# Patient Record
Sex: MALE | NOT HISPANIC OR LATINO | URBAN - METROPOLITAN AREA
[De-identification: names, ages, dates, MRNs, and addresses within clinical notes are randomized per-mention and may not be internally consistent; named-entity substitution may affect disease eponyms.]

---

## 2023-09-07 ENCOUNTER — NURSING HOME VISIT (OUTPATIENT)
Dept: POST ACUTE CARE | Facility: EXTERNAL LOCATION | Age: 88
End: 2023-09-07
Payer: COMMERCIAL

## 2023-09-07 DIAGNOSIS — E78.49 OTHER HYPERLIPIDEMIA: ICD-10-CM

## 2023-09-07 DIAGNOSIS — F03.90 DEMENTIA, UNSPECIFIED DEMENTIA SEVERITY, UNSPECIFIED DEMENTIA TYPE, UNSPECIFIED WHETHER BEHAVIORAL, PSYCHOTIC, OR MOOD DISTURBANCE OR ANXIETY (MULTI): Primary | ICD-10-CM

## 2023-09-07 DIAGNOSIS — R53.1 ASTHENIA: ICD-10-CM

## 2023-09-07 DIAGNOSIS — Z91.81 AT RISK FOR FALLS: ICD-10-CM

## 2023-09-07 DIAGNOSIS — K21.9 GASTROESOPHAGEAL REFLUX DISEASE WITHOUT ESOPHAGITIS: ICD-10-CM

## 2023-09-07 DIAGNOSIS — M19.09 OSTEOARTHRITIS OF OTHER SITE, UNSPECIFIED OSTEOARTHRITIS TYPE: ICD-10-CM

## 2023-09-07 DIAGNOSIS — G47.09 OTHER INSOMNIA: ICD-10-CM

## 2023-09-07 DIAGNOSIS — F32.A DEPRESSION, UNSPECIFIED DEPRESSION TYPE: ICD-10-CM

## 2023-09-07 DIAGNOSIS — F20.89 OTHER SCHIZOPHRENIA (MULTI): ICD-10-CM

## 2023-09-07 DIAGNOSIS — F79 INTELLECTUAL DISABILITY: ICD-10-CM

## 2023-09-07 DIAGNOSIS — N40.0 BENIGN PROSTATIC HYPERPLASIA WITHOUT URINARY OBSTRUCTION: ICD-10-CM

## 2023-09-07 PROCEDURE — 99308 SBSQ NF CARE LOW MDM 20: CPT | Performed by: INTERNAL MEDICINE

## 2023-09-07 NOTE — LETTER
- , , T bili 4.4 on admission.  Previous labs at [facility] on 10/9/17 with AST 71, ALT 23, T bili 0.8.  -AST//154 on 10/28/17  - Hold home statin, and analgesics with acetaminophen.  - Check   - GI consulted    Patient: Anthony Bliar  : 1935    Encounter Date: 2023    NAME OF THE PATIENT: Anthony Blair     YOB: 1935    PLACE OF SERVICE: [facility].    This is a subsequent visit.    HPI: Mr. Anthony Blair is an 88-year-old male with history of dementia and intellectual disability.  He suffers from congestive heart failure and is unable to care for himself.  He requires supportive care.    PAST MEDICAL HISTORY:  As per nursing home list.  GERD, BPH, insomnia, dementia, SVT, CHF, hypertension, intellectual disability, fall risk.    CURRENT MEDICATIONS:  As per nursing home list.  Reviewed.    ALLERGIES:  As per nursing home list.  No known allergies.    SOCIAL HISTORY:  As per nursing home list.  Positive history of tobacco abuse.    FAMILY HISTORY:  As per nursing home list.  Positive history of colon cancer and CHF in this patient's family.    REVIEW OF SYSTEMS:  All 12 systems reviewed and pertaining covered in history and physical, the rest are negative.  The patient denies any fevers, chills, or chest pain at this time.    PHYSICAL EXAMINATION  GENERAL: This is a well-developed, well-nourished male, sitting in a chair, in no acute distress.  VITAL SIGNS:  As recorded and reviewed from EMR.  Blood pressure 124/74.  Pulse 76.  Respirations 18.  Temperature 97.8.  EYES:  The patient's sclerae white.  The pupils were equal and round.  ENT:  The patient's external ears were normal and the otoscopic examination was negative.  NECK:  Supple.  There were no palpable masses.  Thyroid was not enlarged and there were no carotid bruits.  RESPIRATORY:  The patient had normal inspirations and expirations.  The breath sounds were equal bilaterally and clear to auscultation.  CARDIOVASCULAR:  The patient had S1 normal, split S2 without obvious rubs, clicks, or murmurs.  GASTROINTESTINAL:  There was no hepatosplenomegaly.  There were no palpable masses and no inguinal nodes.  LYMPHATIC:  The patient had  no axillary, groin, or lymphadenopathy.  MUSCULOSKELETAL:  The patient had normal gait.  The joints appeared to be normal without evidence of deformity.  Grossly the muscles had good range of motion and were without effusion.  EXTREMITIES:  There was no evidence of peripheral edema.  NEUROLOGIC:  The patient had normal cranial nerves.  The reflexes, sensory, and motor examination were grossly within normal limits.  PSYCHIATRIC: The patient had normal judgment and insight.  The patient was oriented to person, place, and time, and had no obvious mood defect including anxiety and/or agitation.  MENTAL STATUS EXAMINATION: The patient is alert and oriented x2.    LAB WORK: Laboratory studies were reviewed.    ASSESSMENT AND PLAN:  Dementia, unchanged.  Intellectual disability, on supportive care.  Weakness, on PT/OT.  Fall risk, on fall precautions.  Schizoaffective disorder, on medication.  Depression, on medication.  Insomnia, on melatonin.  Hyperlipidemia, on statin.  Reflux disease, on PPI.  Osteoarthritis, on Tylenol.  BPH, on tamsulosin.  Medication list reviewed and discussed with the family.  Hospital chart reviewed.      Electronically Signed By: Don Lozada MD   9/28/23  4:31 PM

## 2023-09-27 PROBLEM — F32.A DEPRESSION: Status: ACTIVE | Noted: 2023-09-27

## 2023-09-27 PROBLEM — G47.09 OTHER INSOMNIA: Status: ACTIVE | Noted: 2023-09-27

## 2023-09-27 PROBLEM — Z91.81 AT RISK FOR FALLS: Status: ACTIVE | Noted: 2023-09-27

## 2023-09-27 PROBLEM — M19.90 OSTEOARTHRITIS: Status: ACTIVE | Noted: 2023-09-27

## 2023-09-27 PROBLEM — N40.0 BENIGN PROSTATIC HYPERPLASIA WITHOUT URINARY OBSTRUCTION: Status: ACTIVE | Noted: 2023-09-27

## 2023-09-27 PROBLEM — F03.90 DEMENTIA (MULTI): Status: ACTIVE | Noted: 2023-09-27

## 2023-09-27 PROBLEM — K21.9 GASTROESOPHAGEAL REFLUX DISEASE WITHOUT ESOPHAGITIS: Status: ACTIVE | Noted: 2023-09-27

## 2023-09-27 PROBLEM — F79 INTELLECTUAL DISABILITY: Status: ACTIVE | Noted: 2023-09-27

## 2023-09-27 PROBLEM — R53.1 ASTHENIA: Status: ACTIVE | Noted: 2023-09-27

## 2023-09-27 PROBLEM — E78.49 OTHER HYPERLIPIDEMIA: Status: ACTIVE | Noted: 2023-09-27

## 2023-09-27 PROBLEM — F20.89 OTHER SCHIZOPHRENIA (MULTI): Status: ACTIVE | Noted: 2023-09-27

## 2023-09-27 NOTE — PROGRESS NOTES
NAME OF THE PATIENT: Anthony Blair     YOB: 1935    PLACE OF SERVICE: [facility].    This is a subsequent visit.    HPI: Mr. Anthony Blair is an 88-year-old male with history of dementia and intellectual disability.  He suffers from congestive heart failure and is unable to care for himself.  He requires supportive care.    PAST MEDICAL HISTORY:  As per nursing home list.  GERD, BPH, insomnia, dementia, SVT, CHF, hypertension, intellectual disability, fall risk.    CURRENT MEDICATIONS:  As per nursing home list.  Reviewed.    ALLERGIES:  As per nursing home list.  No known allergies.    SOCIAL HISTORY:  As per nursing home list.  Positive history of tobacco abuse.    FAMILY HISTORY:  As per nursing home list.  Positive history of colon cancer and CHF in this patient's family.    REVIEW OF SYSTEMS:  All 12 systems reviewed and pertaining covered in history and physical, the rest are negative.  The patient denies any fevers, chills, or chest pain at this time.    PHYSICAL EXAMINATION  GENERAL: This is a well-developed, well-nourished male, sitting in a chair, in no acute distress.  VITAL SIGNS:  As recorded and reviewed from EMR.  Blood pressure 124/74.  Pulse 76.  Respirations 18.  Temperature 97.8.  EYES:  The patient's sclerae white.  The pupils were equal and round.  ENT:  The patient's external ears were normal and the otoscopic examination was negative.  NECK:  Supple.  There were no palpable masses.  Thyroid was not enlarged and there were no carotid bruits.  RESPIRATORY:  The patient had normal inspirations and expirations.  The breath sounds were equal bilaterally and clear to auscultation.  CARDIOVASCULAR:  The patient had S1 normal, split S2 without obvious rubs, clicks, or murmurs.  GASTROINTESTINAL:  There was no hepatosplenomegaly.  There were no palpable masses and no inguinal nodes.  LYMPHATIC:  The patient had no axillary, groin, or lymphadenopathy.  MUSCULOSKELETAL:  The  patient had normal gait.  The joints appeared to be normal without evidence of deformity.  Grossly the muscles had good range of motion and were without effusion.  EXTREMITIES:  There was no evidence of peripheral edema.  NEUROLOGIC:  The patient had normal cranial nerves.  The reflexes, sensory, and motor examination were grossly within normal limits.  PSYCHIATRIC: The patient had normal judgment and insight.  The patient was oriented to person, place, and time, and had no obvious mood defect including anxiety and/or agitation.  MENTAL STATUS EXAMINATION: The patient is alert and oriented x2.    LAB WORK: Laboratory studies were reviewed.    ASSESSMENT AND PLAN:  Dementia, unchanged.  Intellectual disability, on supportive care.  Weakness, on PT/OT.  Fall risk, on fall precautions.  Schizoaffective disorder, on medication.  Depression, on medication.  Insomnia, on melatonin.  Hyperlipidemia, on statin.  Reflux disease, on PPI.  Osteoarthritis, on Tylenol.  BPH, on tamsulosin.  Medication list reviewed and discussed with the family.  Hospital chart reviewed.

## 2023-10-01 ENCOUNTER — NURSING HOME VISIT (OUTPATIENT)
Dept: POST ACUTE CARE | Facility: EXTERNAL LOCATION | Age: 88
End: 2023-10-01
Payer: COMMERCIAL

## 2023-10-01 DIAGNOSIS — F31.9 BIPOLAR AFFECTIVE DISORDER, REMISSION STATUS UNSPECIFIED (MULTI): ICD-10-CM

## 2023-10-01 DIAGNOSIS — R53.1 WEAKNESS: ICD-10-CM

## 2023-10-01 DIAGNOSIS — N40.0 BENIGN PROSTATIC HYPERPLASIA WITHOUT URINARY OBSTRUCTION: ICD-10-CM

## 2023-10-01 DIAGNOSIS — E78.49 OTHER HYPERLIPIDEMIA: ICD-10-CM

## 2023-10-01 DIAGNOSIS — Z91.81 AT RISK FOR FALLS: ICD-10-CM

## 2023-10-01 DIAGNOSIS — K21.9 GASTROESOPHAGEAL REFLUX DISEASE WITHOUT ESOPHAGITIS: ICD-10-CM

## 2023-10-01 DIAGNOSIS — F03.90 DEMENTIA, UNSPECIFIED DEMENTIA SEVERITY, UNSPECIFIED DEMENTIA TYPE, UNSPECIFIED WHETHER BEHAVIORAL, PSYCHOTIC, OR MOOD DISTURBANCE OR ANXIETY (MULTI): ICD-10-CM

## 2023-10-01 DIAGNOSIS — G47.09 OTHER INSOMNIA: ICD-10-CM

## 2023-10-01 DIAGNOSIS — F79 INTELLECTUAL DISABILITY: Primary | ICD-10-CM

## 2023-10-01 DIAGNOSIS — M19.09 OSTEOARTHRITIS OF OTHER SITE, UNSPECIFIED OSTEOARTHRITIS TYPE: ICD-10-CM

## 2023-10-01 PROCEDURE — 99308 SBSQ NF CARE LOW MDM 20: CPT | Performed by: INTERNAL MEDICINE

## 2023-10-01 NOTE — LETTER
Patient: Anthony Blair  : 1935    Encounter Date: 10/01/2023    Subjective  Patient ID: Anthony Blair is a 88 y.o. male who presents for Follow-up.    Mr. Anthony Blair is an 88-year-old male with history of intellectual disability with Alzheimer's dementia.  He is unable to care for himself and requires supportive care.    Review of Systems   Constitutional:  Negative for chills and fever.   Cardiovascular:  Negative for chest pain.   All other systems reviewed and are negative.    Objective  /80   Pulse 76   Temp 36.4 °C (97.5 °F)   Resp 16     Physical Exam  Vitals reviewed.   Constitutional:       General: He is not in acute distress.     Comments: This is a well-developed, elderly male, sitting in a chair.   HENT:      Right Ear: Tympanic membrane, ear canal and external ear normal.      Left Ear: Tympanic membrane, ear canal and external ear normal.   Eyes:      General: No scleral icterus.     Pupils: Pupils are equal, round, and reactive to light.   Neck:      Vascular: No carotid bruit.   Cardiovascular:      Heart sounds: Normal heart sounds, S1 normal and S2 normal. No murmur heard.     No friction rub.   Pulmonary:      Effort: Pulmonary effort is normal.      Breath sounds: Normal breath sounds and air entry.   Abdominal:      Palpations: There is no hepatomegaly, splenomegaly or mass.   Musculoskeletal:         General: No swelling or deformity. Normal range of motion.      Cervical back: Neck supple.      Right lower leg: No edema.      Left lower leg: No edema.   Lymphadenopathy:      Cervical: No cervical adenopathy.      Upper Body:      Right upper body: No axillary adenopathy.      Left upper body: No axillary adenopathy.      Lower Body: No right inguinal adenopathy. No left inguinal adenopathy.   Neurological:      Mental Status: He is alert.      Cranial Nerves: Cranial nerves 2-12 are intact. No cranial nerve deficit.      Sensory: No sensory deficit.      Motor: Motor function is  intact. No weakness.      Gait: Gait is intact.      Deep Tendon Reflexes: Reflexes normal.      Comments: The patient is oriented x2.   Psychiatric:         Mood and Affect: Mood normal. Mood is not anxious or depressed. Affect is not angry.         Behavior: Behavior is not agitated.         Thought Content: Thought content normal.         Judgment: Judgment normal.     LAB WORK:  Laboratory studies reviewed.    Assessment/Plan  Problem List Items Addressed This Visit             ICD-10-CM       Advance Directives and General Issues    At risk for falls Z91.81       Cardiac and Vasculature    Other hyperlipidemia E78.49       Gastrointestinal and Abdominal    Gastroesophageal reflux disease without esophagitis K21.9       Genitourinary and Reproductive    Benign prostatic hyperplasia without urinary obstruction N40.0       Musculoskeletal and Injuries    Osteoarthritis M19.90       Neuro    Dementia (CMS/HCC) F03.90    Intellectual disability - Primary F79       Sleep    Other insomnia G47.09     Other Visit Diagnoses         Codes    Weakness     R53.1    Bipolar affective disorder, remission status unspecified (CMS/HCC)     F31.9        1. Intellectual disability, on supportive care.  2. Dementia, unchanged.  3. Weakness, on PT and OT.  4. Fall risk, fall precaution.  5. BPH, on tamsulosin.  6. Reflux disease, on PPI.  7. Osteoarthritis, on Tylenol.  8. Bipolar disorder, on medication.  9. Insomnia, on medication.  10. Hyperlipidemia, on statin.    Scribe Attestation  By signing my name below, I, Antonette Bravo, Scribe attest that this documentation has been prepared under the direction and in the presence of Don Lozada MD.       Electronically Signed By: Don Lozada MD   11/6/23  5:18 PM

## 2023-10-07 VITALS
TEMPERATURE: 97.5 F | HEART RATE: 76 BPM | RESPIRATION RATE: 16 BRPM | SYSTOLIC BLOOD PRESSURE: 126 MMHG | DIASTOLIC BLOOD PRESSURE: 80 MMHG

## 2023-10-07 ASSESSMENT — ENCOUNTER SYMPTOMS
CHILLS: 0
FEVER: 0

## 2023-10-07 NOTE — PROGRESS NOTES
Subjective   Patient ID: Anthony Blair is a 88 y.o. male who presents for Follow-up.    Mr. Anthony Blair is an 88-year-old male with history of intellectual disability with Alzheimer's dementia.  He is unable to care for himself and requires supportive care.    Review of Systems   Constitutional:  Negative for chills and fever.   Cardiovascular:  Negative for chest pain.   All other systems reviewed and are negative.    Objective   /80   Pulse 76   Temp 36.4 °C (97.5 °F)   Resp 16     Physical Exam  Vitals reviewed.   Constitutional:       General: He is not in acute distress.     Comments: This is a well-developed, elderly male, sitting in a chair.   HENT:      Right Ear: Tympanic membrane, ear canal and external ear normal.      Left Ear: Tympanic membrane, ear canal and external ear normal.   Eyes:      General: No scleral icterus.     Pupils: Pupils are equal, round, and reactive to light.   Neck:      Vascular: No carotid bruit.   Cardiovascular:      Heart sounds: Normal heart sounds, S1 normal and S2 normal. No murmur heard.     No friction rub.   Pulmonary:      Effort: Pulmonary effort is normal.      Breath sounds: Normal breath sounds and air entry.   Abdominal:      Palpations: There is no hepatomegaly, splenomegaly or mass.   Musculoskeletal:         General: No swelling or deformity. Normal range of motion.      Cervical back: Neck supple.      Right lower leg: No edema.      Left lower leg: No edema.   Lymphadenopathy:      Cervical: No cervical adenopathy.      Upper Body:      Right upper body: No axillary adenopathy.      Left upper body: No axillary adenopathy.      Lower Body: No right inguinal adenopathy. No left inguinal adenopathy.   Neurological:      Mental Status: He is alert.      Cranial Nerves: Cranial nerves 2-12 are intact. No cranial nerve deficit.      Sensory: No sensory deficit.      Motor: Motor function is intact. No weakness.      Gait: Gait is intact.      Deep Tendon  Reflexes: Reflexes normal.      Comments: The patient is oriented x2.   Psychiatric:         Mood and Affect: Mood normal. Mood is not anxious or depressed. Affect is not angry.         Behavior: Behavior is not agitated.         Thought Content: Thought content normal.         Judgment: Judgment normal.     LAB WORK:  Laboratory studies reviewed.    Assessment/Plan   Problem List Items Addressed This Visit             ICD-10-CM       Advance Directives and General Issues    At risk for falls Z91.81       Cardiac and Vasculature    Other hyperlipidemia E78.49       Gastrointestinal and Abdominal    Gastroesophageal reflux disease without esophagitis K21.9       Genitourinary and Reproductive    Benign prostatic hyperplasia without urinary obstruction N40.0       Musculoskeletal and Injuries    Osteoarthritis M19.90       Neuro    Dementia (CMS/HCC) F03.90    Intellectual disability - Primary F79       Sleep    Other insomnia G47.09     Other Visit Diagnoses         Codes    Weakness     R53.1    Bipolar affective disorder, remission status unspecified (CMS/HCC)     F31.9        1. Intellectual disability, on supportive care.  2. Dementia, unchanged.  3. Weakness, on PT and OT.  4. Fall risk, fall precaution.  5. BPH, on tamsulosin.  6. Reflux disease, on PPI.  7. Osteoarthritis, on Tylenol.  8. Bipolar disorder, on medication.  9. Insomnia, on medication.  10. Hyperlipidemia, on statin.    Scribe Attestation  By signing my name below, I, Xander Dawn attest that this documentation has been prepared under the direction and in the presence of Don Lozada MD.     All medical record entries made by the scribe were personally dictated by me I have reviewed the chart and agree the record accurately reflects my personal performance of his history physical examination and management

## 2023-11-04 ENCOUNTER — NURSING HOME VISIT (OUTPATIENT)
Dept: POST ACUTE CARE | Facility: EXTERNAL LOCATION | Age: 88
End: 2023-11-04
Payer: COMMERCIAL

## 2023-11-04 DIAGNOSIS — F03.90 DEMENTIA, UNSPECIFIED DEMENTIA SEVERITY, UNSPECIFIED DEMENTIA TYPE, UNSPECIFIED WHETHER BEHAVIORAL, PSYCHOTIC, OR MOOD DISTURBANCE OR ANXIETY (MULTI): Primary | ICD-10-CM

## 2023-11-04 DIAGNOSIS — K21.9 GASTROESOPHAGEAL REFLUX DISEASE WITHOUT ESOPHAGITIS: ICD-10-CM

## 2023-11-04 DIAGNOSIS — F79 INTELLECTUAL DISABILITY: ICD-10-CM

## 2023-11-04 DIAGNOSIS — Z91.81 AT RISK FOR FALLS: ICD-10-CM

## 2023-11-04 DIAGNOSIS — E78.49 OTHER HYPERLIPIDEMIA: ICD-10-CM

## 2023-11-04 DIAGNOSIS — F31.9 BIPOLAR AFFECTIVE DISORDER, REMISSION STATUS UNSPECIFIED (MULTI): ICD-10-CM

## 2023-11-04 DIAGNOSIS — N40.0 BENIGN PROSTATIC HYPERPLASIA WITHOUT URINARY OBSTRUCTION: ICD-10-CM

## 2023-11-04 DIAGNOSIS — G47.09 OTHER INSOMNIA: ICD-10-CM

## 2023-11-04 DIAGNOSIS — R53.1 WEAKNESS: ICD-10-CM

## 2023-11-04 DIAGNOSIS — M19.09 OSTEOARTHRITIS OF OTHER SITE, UNSPECIFIED OSTEOARTHRITIS TYPE: ICD-10-CM

## 2023-11-04 PROCEDURE — 99308 SBSQ NF CARE LOW MDM 20: CPT | Performed by: INTERNAL MEDICINE

## 2023-11-04 NOTE — LETTER
Patient: Anthony Blair  : 1935    Encounter Date: 2023      PLACE OF SERVICE:  [facility].    This is a subsequent visit.    Subjective  Patient ID: Anthony Blair is a 88 y.o. male who presents for Follow-up.    Mr. Anthony Blair is an 88-year-old male with history of intellectual disability who suffers from dementia.  He has several medical issues.  He is unable to care for himself.  He requires supportive care.    Review of Systems   Constitutional:  Negative for chills and fever.   Cardiovascular:  Negative for chest pain.   All other systems reviewed and are negative.    Objective  /80   Pulse 78   Temp 36.4 °C (97.6 °F)   Resp 16     Physical Exam  Vitals reviewed.   Constitutional:       General: He is not in acute distress.     Comments: This is a well-developed, elderly male, sitting in a chair.   HENT:      Right Ear: Tympanic membrane, ear canal and external ear normal.      Left Ear: Tympanic membrane, ear canal and external ear normal.   Eyes:      General: No scleral icterus.     Pupils: Pupils are equal, round, and reactive to light.   Neck:      Vascular: No carotid bruit.   Cardiovascular:      Heart sounds: Normal heart sounds, S1 normal and S2 normal. No murmur heard.     No friction rub.   Pulmonary:      Effort: Pulmonary effort is normal.      Breath sounds: Normal breath sounds and air entry.   Abdominal:      Palpations: There is no hepatomegaly, splenomegaly or mass.   Musculoskeletal:         General: No swelling or deformity. Normal range of motion.      Cervical back: Neck supple.      Right lower leg: No edema.      Left lower leg: No edema.   Lymphadenopathy:      Cervical: No cervical adenopathy.      Upper Body:      Right upper body: No axillary adenopathy.      Left upper body: No axillary adenopathy.      Lower Body: No right inguinal adenopathy. No left inguinal adenopathy.   Neurological:      Mental Status: He is alert.      Cranial Nerves: Cranial  nerves 2-12 are intact. No cranial nerve deficit.      Sensory: No sensory deficit.      Motor: Motor function is intact. No weakness.      Gait: Gait is intact.      Deep Tendon Reflexes: Reflexes normal.      Comments: The patient is oriented x1.   Psychiatric:         Mood and Affect: Mood normal. Mood is not anxious or depressed. Affect is not angry.         Behavior: Behavior is not agitated.         Thought Content: Thought content normal.         Judgment: Judgment normal.     LAB WORK: Laboratory studies were reviewed.    Assessment/Plan  Problem List Items Addressed This Visit             ICD-10-CM       Advance Directives and General Issues    At risk for falls Z91.81       Cardiac and Vasculature    Other hyperlipidemia E78.49       Gastrointestinal and Abdominal    Gastroesophageal reflux disease without esophagitis K21.9       Genitourinary and Reproductive    Benign prostatic hyperplasia without urinary obstruction N40.0       Musculoskeletal and Injuries    Osteoarthritis M19.90       Neuro    Dementia (CMS/HCC) - Primary F03.90    Intellectual disability F79       Sleep    Other insomnia G47.09     Other Visit Diagnoses         Codes    Weakness     R53.1    Bipolar affective disorder, remission status unspecified (CMS/HCC)     F31.9        1. Dementia, unchanged.  2. Intellectual disability, on supportive care.  3. Weakness, on PT/OT  4. Fall risk, on fall precautions.  5. BPH, on tamsulosin.  6. Reflux disease, on PPI  7. Osteoarthritis, on Tylenol.  8. Bipolar disorder, on medication.  9. Hyperlipidemia, on statin.  10. Insomnia, on medication.    Scribe Attestation  By signing my name below, Elizabeth REYES Scribe attest that this documentation has been prepared under the direction and in the presence of Don Lozada MD.     All medical record entries made by the scribe were personally dictated by me I have reviewed the chart and agree the record accurately reflects my personal performance of  his history physical examination and management      Electronically Signed By: Don Lozada MD   11/13/23  5:13 PM

## 2023-11-11 VITALS
SYSTOLIC BLOOD PRESSURE: 126 MMHG | HEART RATE: 78 BPM | RESPIRATION RATE: 16 BRPM | TEMPERATURE: 97.6 F | DIASTOLIC BLOOD PRESSURE: 80 MMHG

## 2023-11-11 ASSESSMENT — ENCOUNTER SYMPTOMS
FEVER: 0
CHILLS: 0

## 2023-11-11 NOTE — PROGRESS NOTES
PLACE OF SERVICE:  [facility].    This is a subsequent visit.    Subjective   Patient ID: Anthony Blair is a 88 y.o. male who presents for Follow-up.    Mr. Anthony Blair is an 88-year-old male with history of intellectual disability who suffers from dementia.  He has several medical issues.  He is unable to care for himself.  He requires supportive care.    Review of Systems   Constitutional:  Negative for chills and fever.   Cardiovascular:  Negative for chest pain.   All other systems reviewed and are negative.    Objective   /80   Pulse 78   Temp 36.4 °C (97.6 °F)   Resp 16     Physical Exam  Vitals reviewed.   Constitutional:       General: He is not in acute distress.     Comments: This is a well-developed, elderly male, sitting in a chair.   HENT:      Right Ear: Tympanic membrane, ear canal and external ear normal.      Left Ear: Tympanic membrane, ear canal and external ear normal.   Eyes:      General: No scleral icterus.     Pupils: Pupils are equal, round, and reactive to light.   Neck:      Vascular: No carotid bruit.   Cardiovascular:      Heart sounds: Normal heart sounds, S1 normal and S2 normal. No murmur heard.     No friction rub.   Pulmonary:      Effort: Pulmonary effort is normal.      Breath sounds: Normal breath sounds and air entry.   Abdominal:      Palpations: There is no hepatomegaly, splenomegaly or mass.   Musculoskeletal:         General: No swelling or deformity. Normal range of motion.      Cervical back: Neck supple.      Right lower leg: No edema.      Left lower leg: No edema.   Lymphadenopathy:      Cervical: No cervical adenopathy.      Upper Body:      Right upper body: No axillary adenopathy.      Left upper body: No axillary adenopathy.      Lower Body: No right inguinal adenopathy. No left inguinal adenopathy.   Neurological:      Mental Status: He is alert.      Cranial Nerves: Cranial nerves 2-12 are intact. No cranial nerve deficit.      Sensory: No  sensory deficit.      Motor: Motor function is intact. No weakness.      Gait: Gait is intact.      Deep Tendon Reflexes: Reflexes normal.      Comments: The patient is oriented x1.   Psychiatric:         Mood and Affect: Mood normal. Mood is not anxious or depressed. Affect is not angry.         Behavior: Behavior is not agitated.         Thought Content: Thought content normal.         Judgment: Judgment normal.     LAB WORK: Laboratory studies were reviewed.    Assessment/Plan   Problem List Items Addressed This Visit             ICD-10-CM       Advance Directives and General Issues    At risk for falls Z91.81       Cardiac and Vasculature    Other hyperlipidemia E78.49       Gastrointestinal and Abdominal    Gastroesophageal reflux disease without esophagitis K21.9       Genitourinary and Reproductive    Benign prostatic hyperplasia without urinary obstruction N40.0       Musculoskeletal and Injuries    Osteoarthritis M19.90       Neuro    Dementia (CMS/HCC) - Primary F03.90    Intellectual disability F79       Sleep    Other insomnia G47.09     Other Visit Diagnoses         Codes    Weakness     R53.1    Bipolar affective disorder, remission status unspecified (CMS/HCC)     F31.9        1. Dementia, unchanged.  2. Intellectual disability, on supportive care.  3. Weakness, on PT/OT  4. Fall risk, on fall precautions.  5. BPH, on tamsulosin.  6. Reflux disease, on PPI  7. Osteoarthritis, on Tylenol.  8. Bipolar disorder, on medication.  9. Hyperlipidemia, on statin.  10. Insomnia, on medication.    Scribe Attestation  By signing my name below, IElizabeth Scribe attest that this documentation has been prepared under the direction and in the presence of Don Lozada MD.     All medical record entries made by the scribe were personally dictated by me I have reviewed the chart and agree the record accurately reflects my personal performance of his history physical examination and management

## 2023-12-02 ENCOUNTER — NURSING HOME VISIT (OUTPATIENT)
Dept: POST ACUTE CARE | Facility: EXTERNAL LOCATION | Age: 88
End: 2023-12-02
Payer: COMMERCIAL

## 2023-12-02 DIAGNOSIS — F32.A DEPRESSION, UNSPECIFIED DEPRESSION TYPE: ICD-10-CM

## 2023-12-02 DIAGNOSIS — R53.1 WEAKNESS: ICD-10-CM

## 2023-12-02 DIAGNOSIS — E78.49 OTHER HYPERLIPIDEMIA: ICD-10-CM

## 2023-12-02 DIAGNOSIS — N40.0 BENIGN PROSTATIC HYPERPLASIA WITHOUT URINARY OBSTRUCTION: ICD-10-CM

## 2023-12-02 DIAGNOSIS — M19.09 OSTEOARTHRITIS OF OTHER SITE, UNSPECIFIED OSTEOARTHRITIS TYPE: ICD-10-CM

## 2023-12-02 DIAGNOSIS — F03.90 DEMENTIA, UNSPECIFIED DEMENTIA SEVERITY, UNSPECIFIED DEMENTIA TYPE, UNSPECIFIED WHETHER BEHAVIORAL, PSYCHOTIC, OR MOOD DISTURBANCE OR ANXIETY (MULTI): Primary | ICD-10-CM

## 2023-12-02 DIAGNOSIS — K21.9 GASTROESOPHAGEAL REFLUX DISEASE WITHOUT ESOPHAGITIS: ICD-10-CM

## 2023-12-02 DIAGNOSIS — G47.09 OTHER INSOMNIA: ICD-10-CM

## 2023-12-02 DIAGNOSIS — Z91.81 AT RISK FOR FALLS: ICD-10-CM

## 2023-12-02 DIAGNOSIS — F31.9 BIPOLAR AFFECTIVE DISORDER, REMISSION STATUS UNSPECIFIED (MULTI): ICD-10-CM

## 2023-12-02 DIAGNOSIS — F79 INTELLECTUAL DISABILITY: ICD-10-CM

## 2023-12-02 PROCEDURE — 99308 SBSQ NF CARE LOW MDM 20: CPT | Performed by: INTERNAL MEDICINE

## 2023-12-02 NOTE — LETTER
Patient: Anthony Blair  : 1935    Encounter Date: 2023    PLACE OF SERVICE:  [facility].    This is a subsequent visit.    Subjective  Patient ID: Anthony Blair is a 88 y.o. male who presents for Follow-up.    Mr. Anthony Blair is an 88-year-old male with a history of dementia.  He suffers from anorexia.  He has multiple medical issues including he is a fall risk.  He requires ssupportive care.    Review of Systems   Constitutional:  Negative for chills and fever.   Cardiovascular:  Negative for chest pain.   All other systems reviewed and are negative.    Objective  /82   Pulse 84   Temp 36.6 °C (97.9 °F)   Resp 18     Physical Exam  Vitals reviewed.   Constitutional:       General: He is not in acute distress.     Comments: This is a well-developed, well-nourished male, sitting in a chair.   HENT:      Right Ear: Tympanic membrane, ear canal and external ear normal.      Left Ear: Tympanic membrane, ear canal and external ear normal.   Eyes:      General: No scleral icterus.     Pupils: Pupils are equal, round, and reactive to light.   Neck:      Vascular: No carotid bruit.   Cardiovascular:      Heart sounds: Normal heart sounds, S1 normal and S2 normal. No murmur heard.     No friction rub.   Pulmonary:      Effort: Pulmonary effort is normal.      Breath sounds: Normal breath sounds and air entry.   Abdominal:      Palpations: There is no hepatomegaly, splenomegaly or mass.   Musculoskeletal:         General: No swelling or deformity. Normal range of motion.      Cervical back: Neck supple.      Right lower leg: No edema.      Left lower leg: No edema.   Lymphadenopathy:      Cervical: No cervical adenopathy.      Upper Body:      Right upper body: No axillary adenopathy.      Left upper body: No axillary adenopathy.      Lower Body: No right inguinal adenopathy. No left inguinal adenopathy.   Neurological:      Mental Status: He is alert.      Cranial Nerves: Cranial nerves 2-12 are  intact. No cranial nerve deficit.      Sensory: No sensory deficit.      Motor: Motor function is intact. No weakness.      Gait: Gait is intact.      Deep Tendon Reflexes: Reflexes normal.      Comments: The patient is oriented x1.   Psychiatric:         Mood and Affect: Mood normal. Mood is not anxious or depressed. Affect is not angry.         Behavior: Behavior is not agitated.         Thought Content: Thought content normal.         Judgment: Judgment normal.     LAB WORK: Laboratory studies were reviewed.    Assessment/Plan  Problem List Items Addressed This Visit             ICD-10-CM       Advance Directives and General Issues    At risk for falls Z91.81       Cardiac and Vasculature    Other hyperlipidemia E78.49       Gastrointestinal and Abdominal    Gastroesophageal reflux disease without esophagitis K21.9       Genitourinary and Reproductive    Benign prostatic hyperplasia without urinary obstruction N40.0       Mental Health    Depression F32.A       Musculoskeletal and Injuries    Osteoarthritis M19.90       Neuro    Dementia (CMS/HCC) - Primary F03.90    Intellectual disability F79       Sleep    Other insomnia G47.09     Other Visit Diagnoses         Codes    Weakness     R53.1    Bipolar affective disorder, remission status unspecified (CMS/HCC)     F31.9        1. Dementia, unchanged.  2. Intellectual disability, on supportive care.  3. Weakness, on PT/OT.  4. Fall risk, on fall precaution.  5. Depression, on medication.  6. Hyperlipidemia, on statin.  7. Bipolar disorder, on medication.  8. Insomnia, on melatonin.  9. Reflux disease, on PPI.  10. BPH, on tamsulosin.  11. Osteoarthritis, on Tylenol.    Scribe Attestation  By signing my name below, I, Antonette Mancillai, Scribe attest that this documentation has been prepared under the direction and in the presence of Don Lozada MD.       Electronically Signed By: Don Lozada MD   1/4/24  2:57 PM

## 2023-12-09 ENCOUNTER — NURSING HOME VISIT (OUTPATIENT)
Dept: POST ACUTE CARE | Facility: EXTERNAL LOCATION | Age: 88
End: 2023-12-09
Payer: COMMERCIAL

## 2023-12-09 DIAGNOSIS — F31.9 BIPOLAR AFFECTIVE DISORDER, REMISSION STATUS UNSPECIFIED (MULTI): ICD-10-CM

## 2023-12-09 DIAGNOSIS — G47.09 OTHER INSOMNIA: ICD-10-CM

## 2023-12-09 DIAGNOSIS — R63.0 ANOREXIA: ICD-10-CM

## 2023-12-09 DIAGNOSIS — N40.0 BENIGN PROSTATIC HYPERPLASIA WITHOUT URINARY OBSTRUCTION: ICD-10-CM

## 2023-12-09 DIAGNOSIS — F03.90 DEMENTIA, UNSPECIFIED DEMENTIA SEVERITY, UNSPECIFIED DEMENTIA TYPE, UNSPECIFIED WHETHER BEHAVIORAL, PSYCHOTIC, OR MOOD DISTURBANCE OR ANXIETY (MULTI): ICD-10-CM

## 2023-12-09 DIAGNOSIS — F32.A DEPRESSION, UNSPECIFIED DEPRESSION TYPE: ICD-10-CM

## 2023-12-09 DIAGNOSIS — F79 INTELLECTUAL DISABILITY: Primary | ICD-10-CM

## 2023-12-09 DIAGNOSIS — R53.1 WEAKNESS: ICD-10-CM

## 2023-12-09 DIAGNOSIS — Z91.81 AT RISK FOR FALLS: ICD-10-CM

## 2023-12-09 DIAGNOSIS — K21.9 GASTROESOPHAGEAL REFLUX DISEASE WITHOUT ESOPHAGITIS: ICD-10-CM

## 2023-12-09 DIAGNOSIS — E78.49 OTHER HYPERLIPIDEMIA: ICD-10-CM

## 2023-12-09 PROCEDURE — 99309 SBSQ NF CARE MODERATE MDM 30: CPT | Performed by: INTERNAL MEDICINE

## 2023-12-09 NOTE — LETTER
Patient: Anthony Blair  : 1935    Encounter Date: 2023    PLACE OF SERVICE:  [facility].    This is a subsequent visit.    Subjective  Patient ID: Anthony Blair is a 88 y.o. male who presents for Follow-up.    Mr. Anthony Blair is an 88-year-old male with history of intellectual disability with dementia and anorexia.  He is unable to care for himself.  He requires supportive care.    Review of Systems   Constitutional:  Negative for chills and fever.   Cardiovascular:  Negative for chest pain.   All other systems reviewed and are negative.    Objective  /80   Pulse 80   Temp 36.6 °C (97.9 °F)   Resp 18     Physical Exam  Vitals reviewed.   Constitutional:       General: He is not in acute distress.     Comments: This is a well-developed, well-nourished male, sitting in a chair.   HENT:      Right Ear: Tympanic membrane, ear canal and external ear normal.      Left Ear: Tympanic membrane, ear canal and external ear normal.   Eyes:      General: No scleral icterus.     Pupils: Pupils are equal, round, and reactive to light.   Neck:      Vascular: No carotid bruit.   Cardiovascular:      Heart sounds: Normal heart sounds, S1 normal and S2 normal. No murmur heard.     No friction rub.   Pulmonary:      Effort: Pulmonary effort is normal.      Breath sounds: Normal breath sounds and air entry.   Abdominal:      Palpations: There is no hepatomegaly, splenomegaly or mass.   Musculoskeletal:         General: No swelling or deformity. Normal range of motion.      Cervical back: Neck supple.      Right lower leg: No edema.      Left lower leg: No edema.   Lymphadenopathy:      Cervical: No cervical adenopathy.      Upper Body:      Right upper body: No axillary adenopathy.      Left upper body: No axillary adenopathy.      Lower Body: No right inguinal adenopathy. No left inguinal adenopathy.   Neurological:      Mental Status: He is alert and oriented to person, place, and time.      Cranial  Nerves: Cranial nerves 2-12 are intact. No cranial nerve deficit.      Sensory: No sensory deficit.      Motor: Motor function is intact. No weakness.      Gait: Gait is intact.      Deep Tendon Reflexes: Reflexes normal.      Comments: The patient is oriented x1.   Psychiatric:         Mood and Affect: Mood normal. Mood is not anxious or depressed. Affect is not angry.         Behavior: Behavior is not agitated.         Thought Content: Thought content normal.         Judgment: Judgment normal.     LAB WORK:  Laboratory studies were reviewed.    Assessment/Plan  Problem List Items Addressed This Visit             ICD-10-CM       Advance Directives and General Issues    At risk for falls Z91.81       Cardiac and Vasculature    Other hyperlipidemia E78.49       Gastrointestinal and Abdominal    Gastroesophageal reflux disease without esophagitis K21.9       Genitourinary and Reproductive    Benign prostatic hyperplasia without urinary obstruction N40.0       Mental Health    Depression F32.A       Neuro    Dementia (CMS/[facility]) F03.90    Intellectual disability - Primary F79       Sleep    Other insomnia G47.09     Other Visit Diagnoses         Codes    Anorexia     R63.0    Weakness     R53.1    Bipolar affective disorder, remission status unspecified (CMS/HCC)     F31.9        1. Intellectual disability, on supportive care.  2. Anorexia.  Encouraged to eat.  3. Dementia, unchanged.  4. Weakness, on PT/OT.  5. Fall risk, on fall precautions.  6. BPH, on tamsulosin.  7. Reflux disease, on PPI.  8. Bipolar disorder, on medication.  9. Hyperlipidemia, on statin.  10. Depression, on medication.  11. Insomnia, on melatonin.    Scribe Attestation  By signing my name below, IAntonette Scribe attest that this documentation has been prepared under the direction and in the presence of Don Lozada MD.   All medical record entries made by the scribe were personally dictated by me I have reviewed the chart and agree the  record accurately reflects my personal performance of his history physical examination and management      Electronically Signed By: Don Lozada MD   12/19/23  4:10 PM

## 2023-12-15 VITALS
SYSTOLIC BLOOD PRESSURE: 126 MMHG | HEART RATE: 84 BPM | TEMPERATURE: 97.9 F | RESPIRATION RATE: 18 BRPM | DIASTOLIC BLOOD PRESSURE: 82 MMHG

## 2023-12-15 VITALS
RESPIRATION RATE: 18 BRPM | TEMPERATURE: 97.9 F | HEART RATE: 80 BPM | SYSTOLIC BLOOD PRESSURE: 126 MMHG | DIASTOLIC BLOOD PRESSURE: 80 MMHG

## 2023-12-15 ASSESSMENT — ENCOUNTER SYMPTOMS
FEVER: 0
FEVER: 0
CHILLS: 0
CHILLS: 0

## 2023-12-15 NOTE — PROGRESS NOTES
PLACE OF SERVICE:  [facility].    This is a subsequent visit.    Subjective   Patient ID: Anthony Blair is a 88 y.o. male who presents for Follow-up.    Mr. Anthony Blair is an 88-year-old male with a history of dementia.  He suffers from anorexia.  He has multiple medical issues including he is a fall risk.  He requires ssupportive care.    Review of Systems   Constitutional:  Negative for chills and fever.   Cardiovascular:  Negative for chest pain.   All other systems reviewed and are negative.    Objective   /82   Pulse 84   Temp 36.6 °C (97.9 °F)   Resp 18     Physical Exam  Vitals reviewed.   Constitutional:       General: He is not in acute distress.     Comments: This is a well-developed, well-nourished male, sitting in a chair.   HENT:      Right Ear: Tympanic membrane, ear canal and external ear normal.      Left Ear: Tympanic membrane, ear canal and external ear normal.   Eyes:      General: No scleral icterus.     Pupils: Pupils are equal, round, and reactive to light.   Neck:      Vascular: No carotid bruit.   Cardiovascular:      Heart sounds: Normal heart sounds, S1 normal and S2 normal. No murmur heard.     No friction rub.   Pulmonary:      Effort: Pulmonary effort is normal.      Breath sounds: Normal breath sounds and air entry.   Abdominal:      Palpations: There is no hepatomegaly, splenomegaly or mass.   Musculoskeletal:         General: No swelling or deformity. Normal range of motion.      Cervical back: Neck supple.      Right lower leg: No edema.      Left lower leg: No edema.   Lymphadenopathy:      Cervical: No cervical adenopathy.      Upper Body:      Right upper body: No axillary adenopathy.      Left upper body: No axillary adenopathy.      Lower Body: No right inguinal adenopathy. No left inguinal adenopathy.   Neurological:      Mental Status: He is alert.      Cranial Nerves: Cranial nerves 2-12 are intact. No cranial nerve deficit.      Sensory: No sensory  deficit.      Motor: Motor function is intact. No weakness.      Gait: Gait is intact.      Deep Tendon Reflexes: Reflexes normal.      Comments: The patient is oriented x1.   Psychiatric:         Mood and Affect: Mood normal. Mood is not anxious or depressed. Affect is not angry.         Behavior: Behavior is not agitated.         Thought Content: Thought content normal.         Judgment: Judgment normal.     LAB WORK: Laboratory studies were reviewed.    Assessment/Plan   Problem List Items Addressed This Visit             ICD-10-CM       Advance Directives and General Issues    At risk for falls Z91.81       Cardiac and Vasculature    Other hyperlipidemia E78.49       Gastrointestinal and Abdominal    Gastroesophageal reflux disease without esophagitis K21.9       Genitourinary and Reproductive    Benign prostatic hyperplasia without urinary obstruction N40.0       Mental Health    Depression F32.A       Musculoskeletal and Injuries    Osteoarthritis M19.90       Neuro    Dementia (CMS/HCC) - Primary F03.90    Intellectual disability F79       Sleep    Other insomnia G47.09     Other Visit Diagnoses         Codes    Weakness     R53.1    Bipolar affective disorder, remission status unspecified (CMS/HCC)     F31.9        1. Dementia, unchanged.  2. Intellectual disability, on supportive care.  3. Weakness, on PT/OT.  4. Fall risk, on fall precaution.  5. Depression, on medication.  6. Hyperlipidemia, on statin.  7. Bipolar disorder, on medication.  8. Insomnia, on melatonin.  9. Reflux disease, on PPI.  10. BPH, on tamsulosin.  11. Osteoarthritis, on Tylenol.    Scribe Attestation  By signing my name below, Antonette REYES, Scribe attest that this documentation has been prepared under the direction and in the presence of Don Lozada MD.

## 2023-12-15 NOTE — PROGRESS NOTES
PLACE OF SERVICE:  [facility].    This is a subsequent visit.    Subjective   Patient ID: Anthony Blair is a 88 y.o. male who presents for Follow-up.    Mr. Anthony Blair is an 88-year-old male with history of intellectual disability with dementia and anorexia.  He is unable to care for himself.  He requires supportive care.    Review of Systems   Constitutional:  Negative for chills and fever.   Cardiovascular:  Negative for chest pain.   All other systems reviewed and are negative.    Objective   /80   Pulse 80   Temp 36.6 °C (97.9 °F)   Resp 18     Physical Exam  Vitals reviewed.   Constitutional:       General: He is not in acute distress.     Comments: This is a well-developed, well-nourished male, sitting in a chair.   HENT:      Right Ear: Tympanic membrane, ear canal and external ear normal.      Left Ear: Tympanic membrane, ear canal and external ear normal.   Eyes:      General: No scleral icterus.     Pupils: Pupils are equal, round, and reactive to light.   Neck:      Vascular: No carotid bruit.   Cardiovascular:      Heart sounds: Normal heart sounds, S1 normal and S2 normal. No murmur heard.     No friction rub.   Pulmonary:      Effort: Pulmonary effort is normal.      Breath sounds: Normal breath sounds and air entry.   Abdominal:      Palpations: There is no hepatomegaly, splenomegaly or mass.   Musculoskeletal:         General: No swelling or deformity. Normal range of motion.      Cervical back: Neck supple.      Right lower leg: No edema.      Left lower leg: No edema.   Lymphadenopathy:      Cervical: No cervical adenopathy.      Upper Body:      Right upper body: No axillary adenopathy.      Left upper body: No axillary adenopathy.      Lower Body: No right inguinal adenopathy. No left inguinal adenopathy.   Neurological:      Mental Status: He is alert and oriented to person, place, and time.      Cranial Nerves: Cranial nerves 2-12 are intact. No cranial nerve deficit.       Sensory: No sensory deficit.      Motor: Motor function is intact. No weakness.      Gait: Gait is intact.      Deep Tendon Reflexes: Reflexes normal.      Comments: The patient is oriented x1.   Psychiatric:         Mood and Affect: Mood normal. Mood is not anxious or depressed. Affect is not angry.         Behavior: Behavior is not agitated.         Thought Content: Thought content normal.         Judgment: Judgment normal.     LAB WORK:  Laboratory studies were reviewed.    Assessment/Plan   Problem List Items Addressed This Visit             ICD-10-CM       Advance Directives and General Issues    At risk for falls Z91.81       Cardiac and Vasculature    Other hyperlipidemia E78.49       Gastrointestinal and Abdominal    Gastroesophageal reflux disease without esophagitis K21.9       Genitourinary and Reproductive    Benign prostatic hyperplasia without urinary obstruction N40.0       Mental Health    Depression F32.A       Neuro    Dementia (CMS/[facility]) F03.90    Intellectual disability - Primary F79       Sleep    Other insomnia G47.09     Other Visit Diagnoses         Codes    Anorexia     R63.0    Weakness     R53.1    Bipolar affective disorder, remission status unspecified (CMS/HCC)     F31.9        1. Intellectual disability, on supportive care.  2. Anorexia.  Encouraged to eat.  3. Dementia, unchanged.  4. Weakness, on PT/OT.  5. Fall risk, on fall precautions.  6. BPH, on tamsulosin.  7. Reflux disease, on PPI.  8. Bipolar disorder, on medication.  9. Hyperlipidemia, on statin.  10. Depression, on medication.  11. Insomnia, on melatonin.    Scribe Attestation  By signing my name below, IAntonette Scribe attest that this documentation has been prepared under the direction and in the presence of Don Lozada MD.   All medical record entries made by the scribe were personally dictated by me I have reviewed the chart and agree the record accurately reflects my personal performance of his history  physical examination and management

## 2023-12-16 ENCOUNTER — NURSING HOME VISIT (OUTPATIENT)
Dept: POST ACUTE CARE | Facility: EXTERNAL LOCATION | Age: 88
End: 2023-12-16
Payer: COMMERCIAL

## 2023-12-16 DIAGNOSIS — F03.90 DEMENTIA, UNSPECIFIED DEMENTIA SEVERITY, UNSPECIFIED DEMENTIA TYPE, UNSPECIFIED WHETHER BEHAVIORAL, PSYCHOTIC, OR MOOD DISTURBANCE OR ANXIETY (MULTI): Primary | ICD-10-CM

## 2023-12-16 DIAGNOSIS — N40.0 BENIGN PROSTATIC HYPERPLASIA WITHOUT URINARY OBSTRUCTION: ICD-10-CM

## 2023-12-16 DIAGNOSIS — Z91.81 AT RISK FOR FALLS: ICD-10-CM

## 2023-12-16 DIAGNOSIS — R63.0 ANOREXIA: ICD-10-CM

## 2023-12-16 DIAGNOSIS — E78.5 HYPERLIPIDEMIA, UNSPECIFIED HYPERLIPIDEMIA TYPE: ICD-10-CM

## 2023-12-16 DIAGNOSIS — M19.90 OSTEOARTHRITIS, UNSPECIFIED OSTEOARTHRITIS TYPE, UNSPECIFIED SITE: ICD-10-CM

## 2023-12-16 DIAGNOSIS — G47.00 INSOMNIA, UNSPECIFIED TYPE: ICD-10-CM

## 2023-12-16 DIAGNOSIS — R53.1 WEAKNESS: ICD-10-CM

## 2023-12-16 DIAGNOSIS — F31.9 BIPOLAR AFFECTIVE DISORDER, REMISSION STATUS UNSPECIFIED (MULTI): ICD-10-CM

## 2023-12-16 DIAGNOSIS — F79 INTELLECTUAL DISABILITY: ICD-10-CM

## 2023-12-16 DIAGNOSIS — K21.9 GASTROESOPHAGEAL REFLUX DISEASE WITHOUT ESOPHAGITIS: ICD-10-CM

## 2023-12-16 PROCEDURE — 99308 SBSQ NF CARE LOW MDM 20: CPT | Performed by: INTERNAL MEDICINE

## 2023-12-16 NOTE — LETTER
Patient: Anthony Blair  : 1935    Encounter Date: 2023    PLACE OF SERVICE:  [facility], [place]    This is a subsequent visit.    Subjective  Patient ID: Anthony Blair is a 88 y.o. male who presents for Follow-up.    Mr. Anthony Blair is an 88-year-old male with history of Alzheimer's dementia.  He suffers from intellectual disability and has a difficult time ambulating.  He requires supportive care.    Review of Systems   Constitutional:  Negative for chills and fever.   Cardiovascular:  Negative for chest pain.   All other systems reviewed and are negative.    Objective  /82   Pulse 80   Temp 36.4 °C (97.6 °F)   Resp 16     Physical Exam  Vitals reviewed.   Constitutional:       General: He is not in acute distress.     Comments: This is a well-developed, well-nourished male, sitting in a chair.   HENT:      Right Ear: Tympanic membrane, ear canal and external ear normal.      Left Ear: Tympanic membrane, ear canal and external ear normal.   Eyes:      General: No scleral icterus.     Pupils: Pupils are equal, round, and reactive to light.   Neck:      Vascular: No carotid bruit.   Cardiovascular:      Heart sounds: Normal heart sounds, S1 normal and S2 normal. No murmur heard.     No friction rub.   Pulmonary:      Effort: Pulmonary effort is normal.      Breath sounds: Normal breath sounds and air entry.   Abdominal:      Palpations: There is no hepatomegaly, splenomegaly or mass.   Musculoskeletal:         General: No swelling or deformity. Normal range of motion.      Cervical back: Neck supple.      Right lower leg: No edema.      Left lower leg: No edema.   Lymphadenopathy:      Cervical: No cervical adenopathy.      Upper Body:      Right upper body: No axillary adenopathy.      Left upper body: No axillary adenopathy.      Lower Body: No right inguinal adenopathy. No left inguinal adenopathy.   Neurological:      Cranial Nerves: Cranial nerves 2-12 are intact. No cranial nerve deficit.       Sensory: No sensory deficit.      Motor: Motor function is intact. No weakness.      Gait: Gait is intact.      Deep Tendon Reflexes: Reflexes normal.      Comments: The patient is alert and oriented x2.   Psychiatric:         Mood and Affect: Mood normal. Mood is not anxious or depressed. Affect is not angry.         Behavior: Behavior is not agitated.         Thought Content: Thought content normal.         Judgment: Judgment normal.     LAB WORK: Laboratory studies were reviewed.    Assessment/Plan  Problem List Items Addressed This Visit             ICD-10-CM       Advance Directives and General Issues    At risk for falls Z91.81       Gastrointestinal and Abdominal    Gastroesophageal reflux disease without esophagitis K21.9       Genitourinary and Reproductive    Benign prostatic hyperplasia without urinary obstruction N40.0       Musculoskeletal and Injuries    Osteoarthritis M19.90       Neuro    Dementia (CMS/HCC) - Primary F03.90    Intellectual disability F79     Other Visit Diagnoses         Codes    Weakness     R53.1    Hyperlipidemia, unspecified hyperlipidemia type     E78.5    Insomnia, unspecified type     G47.00    Bipolar affective disorder, remission status unspecified (CMS/HCC)     F31.9    Anorexia     R63.0        1. Dementia, unchanged.  2. Intellectual disability, on supportive care.  3. Reflux disease, on PPI.  4. Weakness, on PT/OT.  5. Fall risk, on fall precaution.  6. Hyperlipidemia, on statin.  7. Insomnia, on melatonin.  8. Bipolar disorder, on medication.  9. Osteoarthritis, on Tylenol.  10. BPH, on tamsulosin.  11. Anorexia, encouraged to eat.    Scribe Attestation  By signing my name below, IElizabeth, Scribe attest that this documentation has been prepared under the direction and in the presence of Don Lozada MD.       Electronically Signed By: Don Lozada MD   12/26/23  6:55 AM

## 2023-12-22 VITALS
TEMPERATURE: 97.6 F | RESPIRATION RATE: 16 BRPM | HEART RATE: 80 BPM | DIASTOLIC BLOOD PRESSURE: 82 MMHG | SYSTOLIC BLOOD PRESSURE: 126 MMHG

## 2023-12-22 ASSESSMENT — ENCOUNTER SYMPTOMS
CHILLS: 0
FEVER: 0

## 2023-12-22 NOTE — PROGRESS NOTES
PLACE OF SERVICE:  [facility]    This is a subsequent visit.    Subjective   Patient ID: Anthony Blair is a 88 y.o. male who presents for Follow-up.    Mr. Anthony Blair is an 88-year-old male with history of Alzheimer's dementia.  He suffers from intellectual disability and has a difficult time ambulating.  He requires supportive care.    Review of Systems   Constitutional:  Negative for chills and fever.   Cardiovascular:  Negative for chest pain.   All other systems reviewed and are negative.    Objective   /82   Pulse 80   Temp 36.4 °C (97.6 °F)   Resp 16     Physical Exam  Vitals reviewed.   Constitutional:       General: He is not in acute distress.     Comments: This is a well-developed, well-nourished male, sitting in a chair.   HENT:      Right Ear: Tympanic membrane, ear canal and external ear normal.      Left Ear: Tympanic membrane, ear canal and external ear normal.   Eyes:      General: No scleral icterus.     Pupils: Pupils are equal, round, and reactive to light.   Neck:      Vascular: No carotid bruit.   Cardiovascular:      Heart sounds: Normal heart sounds, S1 normal and S2 normal. No murmur heard.     No friction rub.   Pulmonary:      Effort: Pulmonary effort is normal.      Breath sounds: Normal breath sounds and air entry.   Abdominal:      Palpations: There is no hepatomegaly, splenomegaly or mass.   Musculoskeletal:         General: No swelling or deformity. Normal range of motion.      Cervical back: Neck supple.      Right lower leg: No edema.      Left lower leg: No edema.   Lymphadenopathy:      Cervical: No cervical adenopathy.      Upper Body:      Right upper body: No axillary adenopathy.      Left upper body: No axillary adenopathy.      Lower Body: No right inguinal adenopathy. No left inguinal adenopathy.   Neurological:      Cranial Nerves: Cranial nerves 2-12 are intact. No cranial nerve deficit.      Sensory: No sensory deficit.      Motor: Motor function is  intact. No weakness.      Gait: Gait is intact.      Deep Tendon Reflexes: Reflexes normal.      Comments: The patient is alert and oriented x2.   Psychiatric:         Mood and Affect: Mood normal. Mood is not anxious or depressed. Affect is not angry.         Behavior: Behavior is not agitated.         Thought Content: Thought content normal.         Judgment: Judgment normal.     LAB WORK: Laboratory studies were reviewed.    Assessment/Plan   Problem List Items Addressed This Visit             ICD-10-CM       Advance Directives and General Issues    At risk for falls Z91.81       Gastrointestinal and Abdominal    Gastroesophageal reflux disease without esophagitis K21.9       Genitourinary and Reproductive    Benign prostatic hyperplasia without urinary obstruction N40.0       Musculoskeletal and Injuries    Osteoarthritis M19.90       Neuro    Dementia (CMS/HCC) - Primary F03.90    Intellectual disability F79     Other Visit Diagnoses         Codes    Weakness     R53.1    Hyperlipidemia, unspecified hyperlipidemia type     E78.5    Insomnia, unspecified type     G47.00    Bipolar affective disorder, remission status unspecified (CMS/HCC)     F31.9    Anorexia     R63.0        1. Dementia, unchanged.  2. Intellectual disability, on supportive care.  3. Reflux disease, on PPI.  4. Weakness, on PT/OT.  5. Fall risk, on fall precaution.  6. Hyperlipidemia, on statin.  7. Insomnia, on melatonin.  8. Bipolar disorder, on medication.  9. Osteoarthritis, on Tylenol.  10. BPH, on tamsulosin.  11. Anorexia, encouraged to eat.    Scribe Attestation  By signing my name below, IElizabeth Scribe attest that this documentation has been prepared under the direction and in the presence of Don Lozada MD.     All medical record entries made by the scribe were personally dictated by me I have reviewed the chart and agree the record accurately reflects my personal performance of his history physical examination and  management

## 2023-12-23 ENCOUNTER — NURSING HOME VISIT (OUTPATIENT)
Dept: POST ACUTE CARE | Facility: EXTERNAL LOCATION | Age: 88
End: 2023-12-23
Payer: COMMERCIAL

## 2023-12-23 DIAGNOSIS — M19.90 OSTEOARTHRITIS, UNSPECIFIED OSTEOARTHRITIS TYPE, UNSPECIFIED SITE: ICD-10-CM

## 2023-12-23 DIAGNOSIS — F03.90 DEMENTIA, UNSPECIFIED DEMENTIA SEVERITY, UNSPECIFIED DEMENTIA TYPE, UNSPECIFIED WHETHER BEHAVIORAL, PSYCHOTIC, OR MOOD DISTURBANCE OR ANXIETY (MULTI): Primary | ICD-10-CM

## 2023-12-23 DIAGNOSIS — G47.00 INSOMNIA, UNSPECIFIED TYPE: ICD-10-CM

## 2023-12-23 DIAGNOSIS — K21.9 GASTROESOPHAGEAL REFLUX DISEASE WITHOUT ESOPHAGITIS: ICD-10-CM

## 2023-12-23 DIAGNOSIS — F31.9 BIPOLAR AFFECTIVE DISORDER, REMISSION STATUS UNSPECIFIED (MULTI): ICD-10-CM

## 2023-12-23 DIAGNOSIS — R53.1 WEAKNESS: ICD-10-CM

## 2023-12-23 DIAGNOSIS — E78.5 HYPERLIPIDEMIA, UNSPECIFIED HYPERLIPIDEMIA TYPE: ICD-10-CM

## 2023-12-23 DIAGNOSIS — Z91.81 AT RISK FOR FALLS: ICD-10-CM

## 2023-12-23 DIAGNOSIS — F79 INTELLECTUAL DISABILITY: ICD-10-CM

## 2023-12-23 DIAGNOSIS — N40.0 BENIGN PROSTATIC HYPERPLASIA WITHOUT URINARY OBSTRUCTION: ICD-10-CM

## 2023-12-23 PROCEDURE — 99308 SBSQ NF CARE LOW MDM 20: CPT | Performed by: INTERNAL MEDICINE

## 2023-12-23 NOTE — LETTER
Patient: Anthony Blair  : 1935    Encounter Date: 2023    PLACE OF SERVICE:  [facility]    This is a subsequent visit.    Subjective  Patient ID: Anthony Blair is a 88 y.o. male who presents for Follow-up.    Mr. Antohny Blair is an 88-year-old male with history of intellectual disability with Alzheimer's dementia.  He is unable to care for himself and manage his affairs.  He requires supportive care.    Review of Systems   Constitutional:  Negative for chills and fever.   Cardiovascular:  Negative for chest pain.   All other systems reviewed and are negative.    Objective  /72   Pulse 76   Temp 36.4 °C (97.5 °F)   Resp 16     Physical Exam  Vitals reviewed.   Constitutional:       General: He is not in acute distress.     Comments: This is a well-developed, well-nourished male, sitting in a chair.   HENT:      Right Ear: Tympanic membrane, ear canal and external ear normal.      Left Ear: Tympanic membrane, ear canal and external ear normal.   Eyes:      General: No scleral icterus.     Pupils: Pupils are equal, round, and reactive to light.   Neck:      Vascular: No carotid bruit.   Cardiovascular:      Heart sounds: Normal heart sounds, S1 normal and S2 normal. No murmur heard.     No friction rub.   Pulmonary:      Effort: Pulmonary effort is normal.      Breath sounds: Normal breath sounds and air entry.   Abdominal:      Palpations: There is no hepatomegaly, splenomegaly or mass.   Musculoskeletal:         General: No swelling or deformity. Normal range of motion.      Cervical back: Neck supple.      Right lower leg: No edema.      Left lower leg: No edema.   Lymphadenopathy:      Cervical: No cervical adenopathy.      Upper Body:      Right upper body: No axillary adenopathy.      Left upper body: No axillary adenopathy.      Lower Body: No right inguinal adenopathy. No left inguinal adenopathy.   Neurological:      Mental Status: He is oriented to person, place, and time.       Cranial Nerves: Cranial nerves 2-12 are intact. No cranial nerve deficit.      Sensory: No sensory deficit.      Motor: Motor function is intact. No weakness.      Gait: Gait is intact.      Deep Tendon Reflexes: Reflexes normal.      Comments: The patient is alert and oriented x2.   Psychiatric:         Mood and Affect: Mood normal. Mood is not anxious or depressed. Affect is not angry.         Behavior: Behavior is not agitated.         Thought Content: Thought content normal.         Judgment: Judgment normal.     LAB WORK:  Laboratory studies reviewed.    Assessment/Plan  Problem List Items Addressed This Visit             ICD-10-CM       Advance Directives and General Issues    At risk for falls Z91.81       Gastrointestinal and Abdominal    Gastroesophageal reflux disease without esophagitis K21.9       Genitourinary and Reproductive    Benign prostatic hyperplasia without urinary obstruction N40.0       Musculoskeletal and Injuries    Osteoarthritis M19.90       Neuro    Dementia (CMS/HCC) - Primary F03.90    Intellectual disability F79     Other Visit Diagnoses         Codes    Weakness     R53.1    Bipolar affective disorder, remission status unspecified (CMS/HCC)     F31.9    Hyperlipidemia, unspecified hyperlipidemia type     E78.5    Insomnia, unspecified type     G47.00        1. Dementia, unchanged.  2. Intellectual disability, on supportive care.  3. Weakness, on PT and OT.  4. Fall risk, fall precaution.  5. BPH, on tamsulosin.  6. Osteoarthritis, on Tylenol.  7. Bipolar disorder, on medication.  8. Reflux disease, on PPI.  9. Hyperlipidemia, on statin.  10. Insomnia, on melatonin.    Scribe Attestation  By signing my name below, IElizabeth, Scribe attest that this documentation has been prepared under the direction and in the presence of Don Lozada MD.       Electronically Signed By: Don Lozada MD   1/9/24  4:45 PM

## 2024-01-03 VITALS
HEART RATE: 76 BPM | SYSTOLIC BLOOD PRESSURE: 120 MMHG | TEMPERATURE: 97.5 F | DIASTOLIC BLOOD PRESSURE: 72 MMHG | RESPIRATION RATE: 16 BRPM

## 2024-01-03 ASSESSMENT — ENCOUNTER SYMPTOMS
FEVER: 0
CHILLS: 0

## 2024-01-04 NOTE — PROGRESS NOTES
PLACE OF SERVICE:  [facility]    This is a subsequent visit.    Subjective   Patient ID: Anthony Blair is a 88 y.o. male who presents for Follow-up.    Mr. Anthony Blair is an 88-year-old male with history of intellectual disability with Alzheimer's dementia.  He is unable to care for himself and manage his affairs.  He requires supportive care.    Review of Systems   Constitutional:  Negative for chills and fever.   Cardiovascular:  Negative for chest pain.   All other systems reviewed and are negative.    Objective   /72   Pulse 76   Temp 36.4 °C (97.5 °F)   Resp 16     Physical Exam  Vitals reviewed.   Constitutional:       General: He is not in acute distress.     Comments: This is a well-developed, well-nourished male, sitting in a chair.   HENT:      Right Ear: Tympanic membrane, ear canal and external ear normal.      Left Ear: Tympanic membrane, ear canal and external ear normal.   Eyes:      General: No scleral icterus.     Pupils: Pupils are equal, round, and reactive to light.   Neck:      Vascular: No carotid bruit.   Cardiovascular:      Heart sounds: Normal heart sounds, S1 normal and S2 normal. No murmur heard.     No friction rub.   Pulmonary:      Effort: Pulmonary effort is normal.      Breath sounds: Normal breath sounds and air entry.   Abdominal:      Palpations: There is no hepatomegaly, splenomegaly or mass.   Musculoskeletal:         General: No swelling or deformity. Normal range of motion.      Cervical back: Neck supple.      Right lower leg: No edema.      Left lower leg: No edema.   Lymphadenopathy:      Cervical: No cervical adenopathy.      Upper Body:      Right upper body: No axillary adenopathy.      Left upper body: No axillary adenopathy.      Lower Body: No right inguinal adenopathy. No left inguinal adenopathy.   Neurological:      Mental Status: He is oriented to person, place, and time.      Cranial Nerves: Cranial nerves 2-12 are intact. No cranial nerve  deficit.      Sensory: No sensory deficit.      Motor: Motor function is intact. No weakness.      Gait: Gait is intact.      Deep Tendon Reflexes: Reflexes normal.      Comments: The patient is alert and oriented x2.   Psychiatric:         Mood and Affect: Mood normal. Mood is not anxious or depressed. Affect is not angry.         Behavior: Behavior is not agitated.         Thought Content: Thought content normal.         Judgment: Judgment normal.     LAB WORK:  Laboratory studies reviewed.    Assessment/Plan   Problem List Items Addressed This Visit             ICD-10-CM       Advance Directives and General Issues    At risk for falls Z91.81       Gastrointestinal and Abdominal    Gastroesophageal reflux disease without esophagitis K21.9       Genitourinary and Reproductive    Benign prostatic hyperplasia without urinary obstruction N40.0       Musculoskeletal and Injuries    Osteoarthritis M19.90       Neuro    Dementia (CMS/HCC) - Primary F03.90    Intellectual disability F79     Other Visit Diagnoses         Codes    Weakness     R53.1    Bipolar affective disorder, remission status unspecified (CMS/[facility])     F31.9    Hyperlipidemia, unspecified hyperlipidemia type     E78.5    Insomnia, unspecified type     G47.00        1. Dementia, unchanged.  2. Intellectual disability, on supportive care.  3. Weakness, on PT and OT.  4. Fall risk, fall precaution.  5. BPH, on tamsulosin.  6. Osteoarthritis, on Tylenol.  7. Bipolar disorder, on medication.  8. Reflux disease, on PPI.  9. Hyperlipidemia, on statin.  10. Insomnia, on melatonin.    Scribe Attestation  By signing my name below, Elizabeth REYES, Scrholly attest that this documentation has been prepared under the direction and in the presence of Don Lozada MD.

## 2024-01-20 ENCOUNTER — NURSING HOME VISIT (OUTPATIENT)
Dept: POST ACUTE CARE | Facility: EXTERNAL LOCATION | Age: 89
End: 2024-01-20
Payer: COMMERCIAL

## 2024-01-20 DIAGNOSIS — F79 INTELLECTUAL DISABILITY: ICD-10-CM

## 2024-01-20 DIAGNOSIS — F03.90 DEMENTIA, UNSPECIFIED DEMENTIA SEVERITY, UNSPECIFIED DEMENTIA TYPE, UNSPECIFIED WHETHER BEHAVIORAL, PSYCHOTIC, OR MOOD DISTURBANCE OR ANXIETY (MULTI): Primary | ICD-10-CM

## 2024-01-20 DIAGNOSIS — M19.90 OSTEOARTHRITIS, UNSPECIFIED OSTEOARTHRITIS TYPE, UNSPECIFIED SITE: ICD-10-CM

## 2024-01-20 DIAGNOSIS — F32.A DEPRESSION, UNSPECIFIED DEPRESSION TYPE: ICD-10-CM

## 2024-01-20 DIAGNOSIS — G47.00 INSOMNIA, UNSPECIFIED TYPE: ICD-10-CM

## 2024-01-20 DIAGNOSIS — N40.0 BENIGN PROSTATIC HYPERPLASIA WITHOUT URINARY OBSTRUCTION: ICD-10-CM

## 2024-01-20 DIAGNOSIS — R53.1 WEAKNESS: ICD-10-CM

## 2024-01-20 DIAGNOSIS — E78.5 HYPERLIPIDEMIA, UNSPECIFIED HYPERLIPIDEMIA TYPE: ICD-10-CM

## 2024-01-20 DIAGNOSIS — F25.9 SCHIZOAFFECTIVE DISORDER, UNSPECIFIED TYPE (MULTI): ICD-10-CM

## 2024-01-20 DIAGNOSIS — Z91.81 AT RISK FOR FALLS: ICD-10-CM

## 2024-01-20 DIAGNOSIS — K21.9 GASTROESOPHAGEAL REFLUX DISEASE WITHOUT ESOPHAGITIS: ICD-10-CM

## 2024-01-20 PROCEDURE — 99308 SBSQ NF CARE LOW MDM 20: CPT | Performed by: INTERNAL MEDICINE

## 2024-01-20 NOTE — LETTER
Patient: Anthony Blair  : 1935    Encounter Date: 2024    PLACE OF SERVICE:  [facility].    This is a subsequent visit.    Subjective  Patient ID: Anthony Blair is a 88 y.o. male who presents for Follow-up.    Mr. Anthony Blair is an 88-year-old male with history of dementia and intellectual disability.  He is a fall risk and requires supportive care.    Review of Systems   Constitutional:  Negative for chills and fever.   Cardiovascular:  Negative for chest pain.   All other systems reviewed and are negative.    Objective  /74   Pulse 76   Temp 36.5 °C (97.7 °F)   Resp 16     Physical Exam  Vitals reviewed.   Constitutional:       General: He is not in acute distress.     Comments: This is a well-developed, well-nourished male, sitting in a chair.   HENT:      Right Ear: Tympanic membrane, ear canal and external ear normal.      Left Ear: Tympanic membrane, ear canal and external ear normal.   Eyes:      General: No scleral icterus.     Pupils: Pupils are equal, round, and reactive to light.   Neck:      Vascular: No carotid bruit.   Cardiovascular:      Heart sounds: Normal heart sounds, S1 normal and S2 normal. No murmur heard.     No friction rub.   Pulmonary:      Effort: Pulmonary effort is normal.      Breath sounds: Normal breath sounds and air entry.   Abdominal:      Palpations: There is no hepatomegaly, splenomegaly or mass.   Musculoskeletal:         General: No swelling or deformity. Normal range of motion.      Cervical back: Neck supple.      Right lower leg: No edema.      Left lower leg: No edema.   Lymphadenopathy:      Cervical: No cervical adenopathy.      Upper Body:      Right upper body: No axillary adenopathy.      Left upper body: No axillary adenopathy.      Lower Body: No right inguinal adenopathy. No left inguinal adenopathy.   Neurological:      Mental Status: He is alert.      Cranial Nerves: Cranial nerves 2-12 are intact. No cranial nerve deficit.       Sensory: No sensory deficit.      Motor: Motor function is intact. No weakness.      Gait: Gait is intact.      Deep Tendon Reflexes: Reflexes normal.      Comments: The patient is oriented x2.   Psychiatric:         Mood and Affect: Mood normal. Mood is not anxious or depressed. Affect is not angry.         Behavior: Behavior is not agitated.         Thought Content: Thought content normal.         Judgment: Judgment normal.     LAB WORK: Laboratory studies were reviewed.    Assessment/Plan  Problem List Items Addressed This Visit             ICD-10-CM       Advance Directives and General Issues    At risk for falls Z91.81       Gastrointestinal and Abdominal    Gastroesophageal reflux disease without esophagitis K21.9       Genitourinary and Reproductive    Benign prostatic hyperplasia without urinary obstruction N40.0       Mental Health    Depression F32.A       Musculoskeletal and Injuries    Osteoarthritis M19.90       Neuro    Dementia (CMS/HCC) - Primary F03.90    Intellectual disability F79     Other Visit Diagnoses         Codes    Schizoaffective disorder, unspecified type (CMS/[facility])     F25.9    Weakness     R53.1    Hyperlipidemia, unspecified hyperlipidemia type     E78.5    Insomnia, unspecified type     G47.00        1. Dementia, unchanged.  2. Intellectual disability, on supportive care.  3. Schizoaffective disorder, on medication.  4. Weakness, on PT/OT.  5. Fall risk, on fall precaution.  6. Depression, on medication.  7. BPH, on tamsulosin.  8. Osteoarthritis, on Tylenol.  9. Reflux disease, on PPI.  10. Hyperlipidemia, on statin.  11. Insomnia, on melatonin.    Scribe Attestation  By signing my name below, I, Antonette Mancillai, Scribe attest that this documentation has been prepared under the direction and in the presence of Don Lozada MD.       Electronically Signed By: Don Lozada MD   1/31/24  9:27 AM

## 2024-01-25 VITALS
RESPIRATION RATE: 16 BRPM | DIASTOLIC BLOOD PRESSURE: 74 MMHG | TEMPERATURE: 97.7 F | HEART RATE: 76 BPM | SYSTOLIC BLOOD PRESSURE: 124 MMHG

## 2024-01-25 ASSESSMENT — ENCOUNTER SYMPTOMS
CHILLS: 0
FEVER: 0

## 2024-01-25 NOTE — PROGRESS NOTES
PLACE OF SERVICE:  [facility].    This is a subsequent visit.    Subjective   Patient ID: Anthony Blair is a 88 y.o. male who presents for Follow-up.    Mr. Anthony Blair is an 88-year-old male with history of dementia and intellectual disability.  He is a fall risk and requires supportive care.    Review of Systems   Constitutional:  Negative for chills and fever.   Cardiovascular:  Negative for chest pain.   All other systems reviewed and are negative.    Objective   /74   Pulse 76   Temp 36.5 °C (97.7 °F)   Resp 16     Physical Exam  Vitals reviewed.   Constitutional:       General: He is not in acute distress.     Comments: This is a well-developed, well-nourished male, sitting in a chair.   HENT:      Right Ear: Tympanic membrane, ear canal and external ear normal.      Left Ear: Tympanic membrane, ear canal and external ear normal.   Eyes:      General: No scleral icterus.     Pupils: Pupils are equal, round, and reactive to light.   Neck:      Vascular: No carotid bruit.   Cardiovascular:      Heart sounds: Normal heart sounds, S1 normal and S2 normal. No murmur heard.     No friction rub.   Pulmonary:      Effort: Pulmonary effort is normal.      Breath sounds: Normal breath sounds and air entry.   Abdominal:      Palpations: There is no hepatomegaly, splenomegaly or mass.   Musculoskeletal:         General: No swelling or deformity. Normal range of motion.      Cervical back: Neck supple.      Right lower leg: No edema.      Left lower leg: No edema.   Lymphadenopathy:      Cervical: No cervical adenopathy.      Upper Body:      Right upper body: No axillary adenopathy.      Left upper body: No axillary adenopathy.      Lower Body: No right inguinal adenopathy. No left inguinal adenopathy.   Neurological:      Mental Status: He is alert.      Cranial Nerves: Cranial nerves 2-12 are intact. No cranial nerve deficit.      Sensory: No sensory deficit.      Motor: Motor function is intact. No  weakness.      Gait: Gait is intact.      Deep Tendon Reflexes: Reflexes normal.      Comments: The patient is oriented x2.   Psychiatric:         Mood and Affect: Mood normal. Mood is not anxious or depressed. Affect is not angry.         Behavior: Behavior is not agitated.         Thought Content: Thought content normal.         Judgment: Judgment normal.     LAB WORK: Laboratory studies were reviewed.    Assessment/Plan   Problem List Items Addressed This Visit             ICD-10-CM       Advance Directives and General Issues    At risk for falls Z91.81       Gastrointestinal and Abdominal    Gastroesophageal reflux disease without esophagitis K21.9       Genitourinary and Reproductive    Benign prostatic hyperplasia without urinary obstruction N40.0       Mental Health    Depression F32.A       Musculoskeletal and Injuries    Osteoarthritis M19.90       Neuro    Dementia (CMS/HCC) - Primary F03.90    Intellectual disability F79     Other Visit Diagnoses         Codes    Schizoaffective disorder, unspecified type (CMS/[facility])     F25.9    Weakness     R53.1    Hyperlipidemia, unspecified hyperlipidemia type     E78.5    Insomnia, unspecified type     G47.00        1. Dementia, unchanged.  2. Intellectual disability, on supportive care.  3. Schizoaffective disorder, on medication.  4. Weakness, on PT/OT.  5. Fall risk, on fall precaution.  6. Depression, on medication.  7. BPH, on tamsulosin.  8. Osteoarthritis, on Tylenol.  9. Reflux disease, on PPI.  10. Hyperlipidemia, on statin.  11. Insomnia, on melatonin.    Scribe Attestation  By signing my name below, Antonette REYES, Scribtito attest that this documentation has been prepared under the direction and in the presence of Don Lozada MD.

## 2024-02-02 ENCOUNTER — NURSING HOME VISIT (OUTPATIENT)
Dept: POST ACUTE CARE | Facility: EXTERNAL LOCATION | Age: 89
End: 2024-02-02
Payer: COMMERCIAL

## 2024-02-02 DIAGNOSIS — F32.A DEPRESSION, UNSPECIFIED DEPRESSION TYPE: ICD-10-CM

## 2024-02-02 DIAGNOSIS — F03.90 DEMENTIA, UNSPECIFIED DEMENTIA SEVERITY, UNSPECIFIED DEMENTIA TYPE, UNSPECIFIED WHETHER BEHAVIORAL, PSYCHOTIC, OR MOOD DISTURBANCE OR ANXIETY (MULTI): Primary | ICD-10-CM

## 2024-02-02 DIAGNOSIS — F79 INTELLECTUAL DISABILITY: ICD-10-CM

## 2024-02-02 DIAGNOSIS — E78.5 HYPERLIPIDEMIA, UNSPECIFIED HYPERLIPIDEMIA TYPE: ICD-10-CM

## 2024-02-02 DIAGNOSIS — F25.9 SCHIZOAFFECTIVE DISORDER, UNSPECIFIED TYPE (MULTI): ICD-10-CM

## 2024-02-02 DIAGNOSIS — M19.90 OSTEOARTHRITIS, UNSPECIFIED OSTEOARTHRITIS TYPE, UNSPECIFIED SITE: ICD-10-CM

## 2024-02-02 DIAGNOSIS — K21.9 GASTROESOPHAGEAL REFLUX DISEASE WITHOUT ESOPHAGITIS: ICD-10-CM

## 2024-02-02 DIAGNOSIS — F41.9 ANXIETY: ICD-10-CM

## 2024-02-02 DIAGNOSIS — N40.0 BENIGN PROSTATIC HYPERPLASIA WITHOUT URINARY OBSTRUCTION: ICD-10-CM

## 2024-02-02 PROCEDURE — 99308 SBSQ NF CARE LOW MDM 20: CPT | Performed by: INTERNAL MEDICINE

## 2024-02-02 NOTE — LETTER
Patient: Anthony Blair  : 1935    Encounter Date: 2024    PLACE OF SERVICE:  [facility]    This is a subsequent visit.    Subjective  Patient ID: Anthony Blair is a 88 y.o. male who presents for Follow-up.    Mr. Anthony Blair is an 88-year-old male with history of intellectual disability who suffers from dementia.  He has a history of schizoaffective disorder and is unable to care for himself.  He requires supportive care.    Review of Systems   Constitutional:  Negative for chills and fever.   Cardiovascular:  Negative for chest pain.   All other systems reviewed and are negative.    Objective  /82   Pulse 76   Temp 36.5 °C (97.7 °F)   Resp 16     Physical Exam  Vitals reviewed.   Constitutional:       General: He is not in acute distress.     Comments: This is a well-developed, well-nourished male, sitting in a chair   HENT:      Right Ear: Tympanic membrane, ear canal and external ear normal.      Left Ear: Tympanic membrane, ear canal and external ear normal.   Eyes:      General: No scleral icterus.     Pupils: Pupils are equal, round, and reactive to light.   Neck:      Vascular: No carotid bruit.   Cardiovascular:      Heart sounds: Normal heart sounds, S1 normal and S2 normal. No murmur heard.     No friction rub.   Pulmonary:      Effort: Pulmonary effort is normal.      Breath sounds: Normal breath sounds and air entry.   Abdominal:      Palpations: There is no hepatomegaly, splenomegaly or mass.   Musculoskeletal:         General: No swelling or deformity. Normal range of motion.      Cervical back: Neck supple.      Right lower leg: No edema.      Left lower leg: No edema.   Lymphadenopathy:      Cervical: No cervical adenopathy.      Upper Body:      Right upper body: No axillary adenopathy.      Left upper body: No axillary adenopathy.      Lower Body: No right inguinal adenopathy. No left inguinal adenopathy.   Neurological:      Mental Status: He is alert.      Cranial  Nerves: Cranial nerves 2-12 are intact. No cranial nerve deficit.      Sensory: No sensory deficit.      Motor: Motor function is intact. No weakness.      Gait: Gait is intact.      Deep Tendon Reflexes: Reflexes normal.      Comments: The patient is oriented x2.   Psychiatric:         Mood and Affect: Mood normal. Mood is not anxious or depressed. Affect is not angry.         Behavior: Behavior is not agitated.         Thought Content: Thought content normal.         Judgment: Judgment normal.     LAB WORK: Laboratory studies were reviewed.    Assessment/Plan  Problem List Items Addressed This Visit             ICD-10-CM       Gastrointestinal and Abdominal    Gastroesophageal reflux disease without esophagitis K21.9       Genitourinary and Reproductive    Benign prostatic hyperplasia without urinary obstruction N40.0       Mental Health    Depression F32.A       Musculoskeletal and Injuries    Osteoarthritis M19.90       Neuro    Dementia (CMS/HCC) - Primary F03.90    Intellectual disability F79     Other Visit Diagnoses         Codes    Schizoaffective disorder, unspecified type (CMS/[facility])     F25.9    Anxiety     F41.9    Hyperlipidemia, unspecified hyperlipidemia type     E78.5        1. Dementia, unchanged.  2. Schizoaffective disorder, on medication.  3. Intellectual disability, on supportive care.  4. BPH, on tamsulosin.  5. Reflux disease, on PPI.  6. Osteoarthritis, on Tylenol.  7. Anxiety, encouraged to eat.  8. Depression, on medication.  9. Hyperlipidemia, on statin.    Scribe Attestation  By signing my name below, IElizabeth Scribe attest that this documentation has been prepared under the direction and in the presence of Don Lozada MD.       Electronically Signed By: Don Lozada MD   2/20/24  9:27 PM

## 2024-02-11 ENCOUNTER — NURSING HOME VISIT (OUTPATIENT)
Dept: POST ACUTE CARE | Facility: EXTERNAL LOCATION | Age: 89
End: 2024-02-11
Payer: COMMERCIAL

## 2024-02-11 DIAGNOSIS — F03.90 DEMENTIA, UNSPECIFIED DEMENTIA SEVERITY, UNSPECIFIED DEMENTIA TYPE, UNSPECIFIED WHETHER BEHAVIORAL, PSYCHOTIC, OR MOOD DISTURBANCE OR ANXIETY (MULTI): ICD-10-CM

## 2024-02-11 DIAGNOSIS — Z91.81 AT RISK FOR FALLS: ICD-10-CM

## 2024-02-11 DIAGNOSIS — R53.1 WEAKNESS: ICD-10-CM

## 2024-02-11 DIAGNOSIS — K21.9 GASTROESOPHAGEAL REFLUX DISEASE WITHOUT ESOPHAGITIS: ICD-10-CM

## 2024-02-11 DIAGNOSIS — M19.90 OSTEOARTHRITIS, UNSPECIFIED OSTEOARTHRITIS TYPE, UNSPECIFIED SITE: ICD-10-CM

## 2024-02-11 DIAGNOSIS — E78.5 HYPERLIPIDEMIA, UNSPECIFIED HYPERLIPIDEMIA TYPE: ICD-10-CM

## 2024-02-11 DIAGNOSIS — F32.A DEPRESSION, UNSPECIFIED DEPRESSION TYPE: ICD-10-CM

## 2024-02-11 DIAGNOSIS — N40.0 BENIGN PROSTATIC HYPERPLASIA WITHOUT URINARY OBSTRUCTION: ICD-10-CM

## 2024-02-11 DIAGNOSIS — F31.9 BIPOLAR AFFECTIVE DISORDER, REMISSION STATUS UNSPECIFIED (MULTI): Primary | ICD-10-CM

## 2024-02-11 PROCEDURE — 99308 SBSQ NF CARE LOW MDM 20: CPT | Performed by: INTERNAL MEDICINE

## 2024-02-11 NOTE — LETTER
Patient: Anthony Blair  : 1935    Encounter Date: 2024    PLACE OF SERVICE:  [facility].    This is a subsequent visit.    Subjective  Patient ID: Anthony Blair is a 88 y.o. male who presents for Follow-up.    Mr. Anthony Blair is an 88-year-old male with history of schizoaffective disorder.  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  He is unable to care for himself and requires supportive care.    Review of Systems   Constitutional:  Negative for chills and fever.   Cardiovascular:  Negative for chest pain.   All other systems reviewed and are negative.    Objective  /76   Pulse 74   Temp 36.6 °C (97.8 °F)   Resp 16     Physical Exam  Vitals reviewed.   Constitutional:       General: He is not in acute distress.     Comments: This is a well-developed and well-nourished male, sitting in a chair.   HENT:      Right Ear: Tympanic membrane, ear canal and external ear normal.      Left Ear: Tympanic membrane, ear canal and external ear normal.   Eyes:      General: No scleral icterus.     Pupils: Pupils are equal, round, and reactive to light.   Neck:      Vascular: No carotid bruit.   Cardiovascular:      Heart sounds: Normal heart sounds, S1 normal and S2 normal. No murmur heard.     No friction rub.   Pulmonary:      Effort: Pulmonary effort is normal.      Breath sounds: Normal breath sounds and air entry.   Abdominal:      Palpations: There is no hepatomegaly, splenomegaly or mass.   Musculoskeletal:         General: No swelling or deformity. Normal range of motion.      Cervical back: Neck supple.      Right lower leg: No edema.      Left lower leg: No edema.   Lymphadenopathy:      Cervical: No cervical adenopathy.      Upper Body:      Right upper body: No axillary adenopathy.      Left upper body: No axillary adenopathy.      Lower Body: No right inguinal adenopathy. No left inguinal adenopathy.   Neurological:      Mental Status: He is alert.      Cranial Nerves: Cranial nerves 2-12 are  intact. No cranial nerve deficit.      Sensory: No sensory deficit.      Motor: Motor function is intact. No weakness.      Gait: Gait is intact.      Deep Tendon Reflexes: Reflexes normal.      Comments: The patient is oriented x2.   Psychiatric:         Mood and Affect: Mood normal. Mood is not anxious or depressed. Affect is not angry.         Behavior: Behavior is not agitated.         Thought Content: Thought content normal.         Judgment: Judgment normal.     LAB WORK: Laboratory studies were reviewed.    Assessment/Plan  Problem List Items Addressed This Visit             ICD-10-CM       Advance Directives and General Issues    At risk for falls Z91.81       Gastrointestinal and Abdominal    Gastroesophageal reflux disease without esophagitis K21.9       Genitourinary and Reproductive    Benign prostatic hyperplasia without urinary obstruction N40.0       Mental Health    Depression F32.A       Musculoskeletal and Injuries    Osteoarthritis M19.90       Neuro    Dementia (CMS/HCC) F03.90     Other Visit Diagnoses         Codes    Bipolar affective disorder, remission status unspecified (CMS/[facility])    -  Primary F31.9    Weakness     R53.1    Hyperlipidemia, unspecified hyperlipidemia type     E78.5        1. Bipolar disorder, on medication.  2. Dementia, unchanged.  3. Depression, on medication.  4. Weakness, on PT/OT.  5. Fall risk, on fall precaution.  6. BPH, on tamsulosin.  7. Reflux disease, on PPI.  8. Osteoarthritis, on Tylenol.  9. Hyperlipidemia, on statin.    Scribe Attestation  By signing my name below, IAntonette, Xander attest that this documentation has been prepared under the direction and in the presence of Don Lozada MD.       Electronically Signed By: Don Lozada MD   2/27/24  8:50 PM

## 2024-02-15 VITALS
TEMPERATURE: 97.7 F | DIASTOLIC BLOOD PRESSURE: 82 MMHG | RESPIRATION RATE: 16 BRPM | SYSTOLIC BLOOD PRESSURE: 124 MMHG | HEART RATE: 76 BPM

## 2024-02-15 ASSESSMENT — ENCOUNTER SYMPTOMS
CHILLS: 0
FEVER: 0

## 2024-02-15 NOTE — PROGRESS NOTES
PLACE OF SERVICE:  [facility]    This is a subsequent visit.    Subjective   Patient ID: Anthony Blair is a 88 y.o. male who presents for Follow-up.    Mr. Anthony Blair is an 88-year-old male with history of intellectual disability who suffers from dementia.  He has a history of schizoaffective disorder and is unable to care for himself.  He requires supportive care.    Review of Systems   Constitutional:  Negative for chills and fever.   Cardiovascular:  Negative for chest pain.   All other systems reviewed and are negative.    Objective   /82   Pulse 76   Temp 36.5 °C (97.7 °F)   Resp 16     Physical Exam  Vitals reviewed.   Constitutional:       General: He is not in acute distress.     Comments: This is a well-developed, well-nourished male, sitting in a chair   HENT:      Right Ear: Tympanic membrane, ear canal and external ear normal.      Left Ear: Tympanic membrane, ear canal and external ear normal.   Eyes:      General: No scleral icterus.     Pupils: Pupils are equal, round, and reactive to light.   Neck:      Vascular: No carotid bruit.   Cardiovascular:      Heart sounds: Normal heart sounds, S1 normal and S2 normal. No murmur heard.     No friction rub.   Pulmonary:      Effort: Pulmonary effort is normal.      Breath sounds: Normal breath sounds and air entry.   Abdominal:      Palpations: There is no hepatomegaly, splenomegaly or mass.   Musculoskeletal:         General: No swelling or deformity. Normal range of motion.      Cervical back: Neck supple.      Right lower leg: No edema.      Left lower leg: No edema.   Lymphadenopathy:      Cervical: No cervical adenopathy.      Upper Body:      Right upper body: No axillary adenopathy.      Left upper body: No axillary adenopathy.      Lower Body: No right inguinal adenopathy. No left inguinal adenopathy.   Neurological:      Mental Status: He is alert.      Cranial Nerves: Cranial nerves 2-12 are intact. No cranial nerve deficit.       Sensory: No sensory deficit.      Motor: Motor function is intact. No weakness.      Gait: Gait is intact.      Deep Tendon Reflexes: Reflexes normal.      Comments: The patient is oriented x2.   Psychiatric:         Mood and Affect: Mood normal. Mood is not anxious or depressed. Affect is not angry.         Behavior: Behavior is not agitated.         Thought Content: Thought content normal.         Judgment: Judgment normal.     LAB WORK: Laboratory studies were reviewed.    Assessment/Plan   Problem List Items Addressed This Visit             ICD-10-CM       Gastrointestinal and Abdominal    Gastroesophageal reflux disease without esophagitis K21.9       Genitourinary and Reproductive    Benign prostatic hyperplasia without urinary obstruction N40.0       Mental Health    Depression F32.A       Musculoskeletal and Injuries    Osteoarthritis M19.90       Neuro    Dementia (CMS/HCC) - Primary F03.90    Intellectual disability F79     Other Visit Diagnoses         Codes    Schizoaffective disorder, unspecified type (CMS/[facility])     F25.9    Anxiety     F41.9    Hyperlipidemia, unspecified hyperlipidemia type     E78.5        1. Dementia, unchanged.  2. Schizoaffective disorder, on medication.  3. Intellectual disability, on supportive care.  4. BPH, on tamsulosin.  5. Reflux disease, on PPI.  6. Osteoarthritis, on Tylenol.  7. Anxiety, encouraged to eat.  8. Depression, on medication.  9. Hyperlipidemia, on statin.    Scribe Attestation  By signing my name below, IElizabeth Scribe attest that this documentation has been prepared under the direction and in the presence of Don Lozada MD.

## 2024-02-24 VITALS
SYSTOLIC BLOOD PRESSURE: 120 MMHG | HEART RATE: 74 BPM | RESPIRATION RATE: 16 BRPM | TEMPERATURE: 97.8 F | DIASTOLIC BLOOD PRESSURE: 76 MMHG

## 2024-02-24 ASSESSMENT — ENCOUNTER SYMPTOMS
CHILLS: 0
FEVER: 0

## 2024-02-24 NOTE — PROGRESS NOTES
PLACE OF SERVICE:  [facility].    This is a subsequent visit.    Subjective   Patient ID: Anthony Blair is a 88 y.o. male who presents for Follow-up.    Mr. Anthony Blair is an 88-year-old male with history of schizoaffective disorder.  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  He is unable to care for himself and requires supportive care.    Review of Systems   Constitutional:  Negative for chills and fever.   Cardiovascular:  Negative for chest pain.   All other systems reviewed and are negative.    Objective   /76   Pulse 74   Temp 36.6 °C (97.8 °F)   Resp 16     Physical Exam  Vitals reviewed.   Constitutional:       General: He is not in acute distress.     Comments: This is a well-developed and well-nourished male, sitting in a chair.   HENT:      Right Ear: Tympanic membrane, ear canal and external ear normal.      Left Ear: Tympanic membrane, ear canal and external ear normal.   Eyes:      General: No scleral icterus.     Pupils: Pupils are equal, round, and reactive to light.   Neck:      Vascular: No carotid bruit.   Cardiovascular:      Heart sounds: Normal heart sounds, S1 normal and S2 normal. No murmur heard.     No friction rub.   Pulmonary:      Effort: Pulmonary effort is normal.      Breath sounds: Normal breath sounds and air entry.   Abdominal:      Palpations: There is no hepatomegaly, splenomegaly or mass.   Musculoskeletal:         General: No swelling or deformity. Normal range of motion.      Cervical back: Neck supple.      Right lower leg: No edema.      Left lower leg: No edema.   Lymphadenopathy:      Cervical: No cervical adenopathy.      Upper Body:      Right upper body: No axillary adenopathy.      Left upper body: No axillary adenopathy.      Lower Body: No right inguinal adenopathy. No left inguinal adenopathy.   Neurological:      Mental Status: He is alert.      Cranial Nerves: Cranial nerves 2-12 are intact. No cranial nerve deficit.      Sensory: No sensory deficit.       Motor: Motor function is intact. No weakness.      Gait: Gait is intact.      Deep Tendon Reflexes: Reflexes normal.      Comments: The patient is oriented x2.   Psychiatric:         Mood and Affect: Mood normal. Mood is not anxious or depressed. Affect is not angry.         Behavior: Behavior is not agitated.         Thought Content: Thought content normal.         Judgment: Judgment normal.     LAB WORK: Laboratory studies were reviewed.    Assessment/Plan   Problem List Items Addressed This Visit             ICD-10-CM       Advance Directives and General Issues    At risk for falls Z91.81       Gastrointestinal and Abdominal    Gastroesophageal reflux disease without esophagitis K21.9       Genitourinary and Reproductive    Benign prostatic hyperplasia without urinary obstruction N40.0       Mental Health    Depression F32.A       Musculoskeletal and Injuries    Osteoarthritis M19.90       Neuro    Dementia (CMS/[facility]) F03.90     Other Visit Diagnoses         Codes    Bipolar affective disorder, remission status unspecified (CMS/[facility])    -  Primary F31.9    Weakness     R53.1    Hyperlipidemia, unspecified hyperlipidemia type     E78.5        1. Bipolar disorder, on medication.  2. Dementia, unchanged.  3. Depression, on medication.  4. Weakness, on PT/OT.  5. Fall risk, on fall precaution.  6. BPH, on tamsulosin.  7. Reflux disease, on PPI.  8. Osteoarthritis, on Tylenol.  9. Hyperlipidemia, on statin.    Scribe Attestation  By signing my name below, I, Xander Dawn attest that this documentation has been prepared under the direction and in the presence of Don Lozada MD.

## 2024-03-02 ENCOUNTER — NURSING HOME VISIT (OUTPATIENT)
Dept: POST ACUTE CARE | Facility: EXTERNAL LOCATION | Age: 89
End: 2024-03-02
Payer: COMMERCIAL

## 2024-03-02 DIAGNOSIS — F31.9 BIPOLAR AFFECTIVE DISORDER, REMISSION STATUS UNSPECIFIED (MULTI): Primary | ICD-10-CM

## 2024-03-02 DIAGNOSIS — F02.80 ALZHEIMER'S DEMENTIA, UNSPECIFIED DEMENTIA SEVERITY, UNSPECIFIED TIMING OF DEMENTIA ONSET, UNSPECIFIED WHETHER BEHAVIORAL, PSYCHOTIC, OR MOOD DISTURBANCE OR ANXIETY (MULTI): ICD-10-CM

## 2024-03-02 DIAGNOSIS — G30.9 ALZHEIMER'S DEMENTIA, UNSPECIFIED DEMENTIA SEVERITY, UNSPECIFIED TIMING OF DEMENTIA ONSET, UNSPECIFIED WHETHER BEHAVIORAL, PSYCHOTIC, OR MOOD DISTURBANCE OR ANXIETY (MULTI): ICD-10-CM

## 2024-03-02 DIAGNOSIS — G47.09 OTHER INSOMNIA: ICD-10-CM

## 2024-03-02 DIAGNOSIS — R53.1 WEAKNESS: ICD-10-CM

## 2024-03-02 DIAGNOSIS — I10 HYPERTENSION, UNSPECIFIED TYPE: ICD-10-CM

## 2024-03-02 DIAGNOSIS — M19.90 OSTEOARTHRITIS, UNSPECIFIED OSTEOARTHRITIS TYPE, UNSPECIFIED SITE: ICD-10-CM

## 2024-03-02 DIAGNOSIS — K21.9 GASTROESOPHAGEAL REFLUX DISEASE WITHOUT ESOPHAGITIS: ICD-10-CM

## 2024-03-02 DIAGNOSIS — N40.0 BENIGN PROSTATIC HYPERPLASIA WITHOUT URINARY OBSTRUCTION: ICD-10-CM

## 2024-03-02 DIAGNOSIS — F79 INTELLECTUAL DISABILITY: ICD-10-CM

## 2024-03-02 DIAGNOSIS — Z91.81 AT RISK FOR FALLS: ICD-10-CM

## 2024-03-02 DIAGNOSIS — F32.A DEPRESSION, UNSPECIFIED DEPRESSION TYPE: ICD-10-CM

## 2024-03-02 PROCEDURE — 99308 SBSQ NF CARE LOW MDM 20: CPT | Performed by: INTERNAL MEDICINE

## 2024-03-02 NOTE — LETTER
Patient: Anthony Blair  : 1935    Encounter Date: 2024    PLACE OF SERVICE:  [facility].    This is a subsequent visit.    Subjective  Patient ID: Anthony Blair is a 89 y.o. male who presents for Follow-up.    Mr. Anthony Blair is an 89-year-old male with history of Alzheimer's dementia with bipolar disorder.  He is unable to care for himself and manage his affairs.  He requires supportive care.    Review of Systems   Constitutional:  Negative for chills and fever.   Cardiovascular:  Negative for chest pain.   All other systems reviewed and are negative.    Objective  /80   Pulse 76   Temp 36.6 °C (97.8 °F)   Resp 16     Physical Exam  Vitals reviewed.   Constitutional:       General: He is not in acute distress.     Comments: This is a well-developed, well-nourished male, sitting in a chair.   HENT:      Right Ear: Tympanic membrane, ear canal and external ear normal.      Left Ear: Tympanic membrane, ear canal and external ear normal.   Eyes:      General: No scleral icterus.     Pupils: Pupils are equal, round, and reactive to light.   Neck:      Vascular: No carotid bruit.   Cardiovascular:      Heart sounds: Normal heart sounds, S1 normal and S2 normal. No murmur heard.     No friction rub.   Pulmonary:      Effort: Pulmonary effort is normal.      Breath sounds: Normal breath sounds and air entry.   Abdominal:      Palpations: There is no hepatomegaly, splenomegaly or mass.   Musculoskeletal:         General: No swelling or deformity. Normal range of motion.      Cervical back: Neck supple.      Right lower leg: No edema.      Left lower leg: No edema.   Lymphadenopathy:      Cervical: No cervical adenopathy.      Upper Body:      Right upper body: No axillary adenopathy.      Left upper body: No axillary adenopathy.      Lower Body: No right inguinal adenopathy. No left inguinal adenopathy.   Neurological:      Mental Status: He is alert.      Cranial Nerves: Cranial nerves 2-12  are intact. No cranial nerve deficit.      Sensory: No sensory deficit.      Motor: Motor function is intact. No weakness.      Gait: Gait is intact.      Deep Tendon Reflexes: Reflexes normal.      Comments: The patient is oriented x2.   Psychiatric:         Mood and Affect: Mood normal. Mood is not anxious or depressed. Affect is not angry.         Behavior: Behavior is not agitated.         Thought Content: Thought content normal.         Judgment: Judgment normal.     LAB WORK:  Laboratory studies were reviewed.    Assessment/Plan  Problem List Items Addressed This Visit             ICD-10-CM       Advance Directives and General Issues    At risk for falls Z91.81       Gastrointestinal and Abdominal    Gastroesophageal reflux disease without esophagitis K21.9       Genitourinary and Reproductive    Benign prostatic hyperplasia without urinary obstruction N40.0       Mental Health    Depression F32.A       Musculoskeletal and Injuries    Osteoarthritis M19.90       Neuro    Dementia (CMS/HCC) F03.90    Intellectual disability F79       Sleep    Other insomnia G47.09     Other Visit Diagnoses         Codes    Bipolar affective disorder, remission status unspecified (CMS/HCC)    -  Primary F31.9    Hypertension, unspecified type     I10    Weakness     R53.1        1. Bipolar disorder, on medication.  2. Alzheimer's dementia, unchanged.  3. BPH, on tamsulosin.  4. Intellectual disability, on supportive care.  5. Hypertension, medically controlled.  6. Reflux disease, on PPI.  7. Osteoarthritis, on Tylenol.  8. Insomnia, on melatonin.  9. Weakness, on PT/OT.  10. Fall risk, on fall precautions.  11. Depression, on medication.    Scribe Attestation  By signing my name below, I, Antonette Lawrence, Scribe attest that this documentation has been prepared under the direction and in the presence of Don Lozada MD.       Electronically Signed By: Don Lozada MD   3/7/24  3:01 PM

## 2024-03-04 VITALS
TEMPERATURE: 97.8 F | RESPIRATION RATE: 16 BRPM | SYSTOLIC BLOOD PRESSURE: 124 MMHG | DIASTOLIC BLOOD PRESSURE: 80 MMHG | HEART RATE: 76 BPM

## 2024-03-04 ASSESSMENT — ENCOUNTER SYMPTOMS
CHILLS: 0
FEVER: 0

## 2024-03-04 NOTE — PROGRESS NOTES
PLACE OF SERVICE:  [facility].    This is a subsequent visit.    Subjective   Patient ID: Anthony Blair is a 89 y.o. male who presents for Follow-up.    Mr. Anthony Blair is an 89-year-old male with history of Alzheimer's dementia with bipolar disorder.  He is unable to care for himself and manage his affairs.  He requires supportive care.    Review of Systems   Constitutional:  Negative for chills and fever.   Cardiovascular:  Negative for chest pain.   All other systems reviewed and are negative.    Objective   /80   Pulse 76   Temp 36.6 °C (97.8 °F)   Resp 16     Physical Exam  Vitals reviewed.   Constitutional:       General: He is not in acute distress.     Comments: This is a well-developed, well-nourished male, sitting in a chair.   HENT:      Right Ear: Tympanic membrane, ear canal and external ear normal.      Left Ear: Tympanic membrane, ear canal and external ear normal.   Eyes:      General: No scleral icterus.     Pupils: Pupils are equal, round, and reactive to light.   Neck:      Vascular: No carotid bruit.   Cardiovascular:      Heart sounds: Normal heart sounds, S1 normal and S2 normal. No murmur heard.     No friction rub.   Pulmonary:      Effort: Pulmonary effort is normal.      Breath sounds: Normal breath sounds and air entry.   Abdominal:      Palpations: There is no hepatomegaly, splenomegaly or mass.   Musculoskeletal:         General: No swelling or deformity. Normal range of motion.      Cervical back: Neck supple.      Right lower leg: No edema.      Left lower leg: No edema.   Lymphadenopathy:      Cervical: No cervical adenopathy.      Upper Body:      Right upper body: No axillary adenopathy.      Left upper body: No axillary adenopathy.      Lower Body: No right inguinal adenopathy. No left inguinal adenopathy.   Neurological:      Mental Status: He is alert.      Cranial Nerves: Cranial nerves 2-12 are intact. No cranial nerve deficit.      Sensory: No sensory  deficit.      Motor: Motor function is intact. No weakness.      Gait: Gait is intact.      Deep Tendon Reflexes: Reflexes normal.      Comments: The patient is oriented x2.   Psychiatric:         Mood and Affect: Mood normal. Mood is not anxious or depressed. Affect is not angry.         Behavior: Behavior is not agitated.         Thought Content: Thought content normal.         Judgment: Judgment normal.     LAB WORK:  Laboratory studies were reviewed.    Assessment/Plan   Problem List Items Addressed This Visit             ICD-10-CM       Advance Directives and General Issues    At risk for falls Z91.81       Gastrointestinal and Abdominal    Gastroesophageal reflux disease without esophagitis K21.9       Genitourinary and Reproductive    Benign prostatic hyperplasia without urinary obstruction N40.0       Mental Health    Depression F32.A       Musculoskeletal and Injuries    Osteoarthritis M19.90       Neuro    Dementia (CMS/HCC) F03.90    Intellectual disability F79       Sleep    Other insomnia G47.09     Other Visit Diagnoses         Codes    Bipolar affective disorder, remission status unspecified (CMS/HCC)    -  Primary F31.9    Hypertension, unspecified type     I10    Weakness     R53.1        1. Bipolar disorder, on medication.  2. Alzheimer's dementia, unchanged.  3. BPH, on tamsulosin.  4. Intellectual disability, on supportive care.  5. Hypertension, medically controlled.  6. Reflux disease, on PPI.  7. Osteoarthritis, on Tylenol.  8. Insomnia, on melatonin.  9. Weakness, on PT/OT.  10. Fall risk, on fall precautions.  11. Depression, on medication.    Scribe Attestation  By signing my name below, IAntonette, Scribe attest that this documentation has been prepared under the direction and in the presence of Don Lozada MD.

## 2024-03-18 PROCEDURE — 99283 EMERGENCY DEPT VISIT LOW MDM: CPT | Mod: 25

## 2024-03-18 PROCEDURE — 51702 INSERT TEMP BLADDER CATH: CPT

## 2024-03-19 ENCOUNTER — HOSPITAL ENCOUNTER (EMERGENCY)
Facility: HOSPITAL | Age: 89
Discharge: HOME | End: 2024-03-19
Attending: EMERGENCY MEDICINE
Payer: COMMERCIAL

## 2024-03-19 VITALS
WEIGHT: 138.89 LBS | HEART RATE: 61 BPM | TEMPERATURE: 97.5 F | HEIGHT: 68 IN | RESPIRATION RATE: 18 BRPM | SYSTOLIC BLOOD PRESSURE: 136 MMHG | OXYGEN SATURATION: 96 % | DIASTOLIC BLOOD PRESSURE: 67 MMHG | BODY MASS INDEX: 21.05 KG/M2

## 2024-03-19 DIAGNOSIS — T83.9XXA PROBLEM WITH FOLEY CATHETER, INITIAL ENCOUNTER (CMS-HCC): Primary | ICD-10-CM

## 2024-03-19 LAB
APPEARANCE UR: ABNORMAL
BACTERIA #/AREA URNS AUTO: ABNORMAL /HPF
BILIRUB UR STRIP.AUTO-MCNC: NEGATIVE MG/DL
COLOR UR: ABNORMAL
GLUCOSE UR STRIP.AUTO-MCNC: NORMAL MG/DL
KETONES UR STRIP.AUTO-MCNC: NEGATIVE MG/DL
LEUKOCYTE ESTERASE UR QL STRIP.AUTO: ABNORMAL
MUCOUS THREADS #/AREA URNS AUTO: ABNORMAL /LPF
NITRITE UR QL STRIP.AUTO: ABNORMAL
PH UR STRIP.AUTO: 5.5 [PH]
PROT UR STRIP.AUTO-MCNC: NEGATIVE MG/DL
RBC # UR STRIP.AUTO: NEGATIVE /UL
RBC #/AREA URNS AUTO: ABNORMAL /HPF
SP GR UR STRIP.AUTO: 1.02
SQUAMOUS #/AREA URNS AUTO: ABNORMAL /HPF
UROBILINOGEN UR STRIP.AUTO-MCNC: NORMAL MG/DL
WBC #/AREA URNS AUTO: >50 /HPF
WBC CLUMPS #/AREA URNS AUTO: ABNORMAL /HPF

## 2024-03-19 PROCEDURE — 81001 URINALYSIS AUTO W/SCOPE: CPT | Performed by: EMERGENCY MEDICINE

## 2024-03-19 ASSESSMENT — PAIN - FUNCTIONAL ASSESSMENT
PAIN_FUNCTIONAL_ASSESSMENT: 0-10
PAIN_FUNCTIONAL_ASSESSMENT: 0-10

## 2024-03-19 ASSESSMENT — PAIN SCALES - GENERAL
PAINLEVEL_OUTOF10: 0 - NO PAIN
PAINLEVEL_OUTOF10: 0 - NO PAIN

## 2024-03-19 NOTE — ED PROVIDER NOTES
HPI   Chief Complaint   Patient presents with    Ward Cath Issue     Patient was sent from nursing facility after catheter accidentally got pulled out, They stated they were unable to place a new ward in.        HPI  89-year-old male presents from nursing home after he pulled out his Ward catheter.  Apparently they were unable to place a new 1 so they sent to the emergency department.  No other complaints.                  Thu Coma Scale Score: 14                     Patient History   Past Medical History:   Diagnosis Date    Alzheimer's dementia (CMS/[facility])     Anorexia     Anxiety     ASHD (arteriosclerotic heart disease)     At risk for falls     Bipolar disorder (CMS/HCC)     BPH (benign prostatic hyperplasia)     Cognitive decline     COVID-19     Dementia (CMS/[facility])     Depression     Frequent falls     GERD (gastroesophageal reflux disease)     Hyperlipidemia     Hypertension     Insomnia     Intellectual disability     Neuropathy     Osteoarthritis     Schizoaffective disorder (CMS/[facility])     Weakness      No past surgical history on file.  No family history on file.  Social History     Tobacco Use    Smoking status: Never    Smokeless tobacco: Not on file   Substance Use Topics    Alcohol use: Never    Drug use: Not on file       Physical Exam   ED Triage Vitals [03/19/24 0006]   Temperature Heart Rate Respirations BP   36.4 °C (97.5 °F) 61 18 136/67      Pulse Ox Temp Source Heart Rate Source Patient Position   96 % Temporal Monitor Lying      BP Location FiO2 (%)     Left arm --       Physical Exam  General:  Awake, alert, no acute distress.  Head: Normocephalic, Atraumatic  Neck: Supple, trachea midline, no stridor  Skin: Warm and dry, no rashes   Lungs:  No acute respiratory distress, speaking in full sentences without difficulty  Abdomen: Soft, nontender, nondistended  Neuro:  No gross focal neurologic deficits, NIH is 0  Musculoskeletal:  Full range of motion in all 4 extremities  Psychiatric:   Alert, slightly confused consistent with his dementia  ED Course & MDM   Diagnoses as of 03/19/24 0115   Problem with Lucas catheter, initial encounter (CMS/[facility])       Medical Decision Making  Patient's Lucas catheter was placed.  Urine was ordered I will not treat based on the urinalysis await culture.  He is stable for discharge back to the nursing facility    Procedure  Procedures     Chya Pelaez, DO  03/19/24 0116

## 2024-04-06 ENCOUNTER — NURSING HOME VISIT (OUTPATIENT)
Dept: POST ACUTE CARE | Facility: EXTERNAL LOCATION | Age: 89
End: 2024-04-06
Payer: COMMERCIAL

## 2024-04-06 DIAGNOSIS — I10 HYPERTENSION, UNSPECIFIED TYPE: ICD-10-CM

## 2024-04-06 DIAGNOSIS — M19.90 OSTEOARTHRITIS, UNSPECIFIED OSTEOARTHRITIS TYPE, UNSPECIFIED SITE: ICD-10-CM

## 2024-04-06 DIAGNOSIS — K21.9 GASTROESOPHAGEAL REFLUX DISEASE WITHOUT ESOPHAGITIS: ICD-10-CM

## 2024-04-06 DIAGNOSIS — N40.0 BENIGN PROSTATIC HYPERPLASIA WITHOUT URINARY OBSTRUCTION: ICD-10-CM

## 2024-04-06 DIAGNOSIS — F79 INTELLECTUAL DISABILITY: ICD-10-CM

## 2024-04-06 DIAGNOSIS — E78.5 HYPERLIPIDEMIA, UNSPECIFIED HYPERLIPIDEMIA TYPE: ICD-10-CM

## 2024-04-06 DIAGNOSIS — F25.9 SCHIZOAFFECTIVE DISORDER, UNSPECIFIED TYPE (MULTI): ICD-10-CM

## 2024-04-06 DIAGNOSIS — F32.A DEPRESSION, UNSPECIFIED DEPRESSION TYPE: ICD-10-CM

## 2024-04-06 DIAGNOSIS — F03.90 DEMENTIA, UNSPECIFIED DEMENTIA SEVERITY, UNSPECIFIED DEMENTIA TYPE, UNSPECIFIED WHETHER BEHAVIORAL, PSYCHOTIC, OR MOOD DISTURBANCE OR ANXIETY (MULTI): Primary | ICD-10-CM

## 2024-04-06 PROCEDURE — 99309 SBSQ NF CARE MODERATE MDM 30: CPT | Performed by: INTERNAL MEDICINE

## 2024-04-06 NOTE — LETTER
Patient: Anthony Blair  : 1935    Encounter Date: 2024    PLACE OF SERVICE:  [facility]    This is a subsequent visit.    Subjective  Patient ID: Anthony Blair is a 89 y.o. male who presents for Follow-up.    Mr. Anthony Blair is an 89-year-old male with history of Alzheimer's dementia with schizoaffective disorder.  He does have a history of intellectual disability.  He is unable to care for himself and requires supportive care    Review of Systems   Constitutional:  Negative for chills and fever.   Cardiovascular:  Negative for chest pain.   All other systems reviewed and are negative.    Objective  /78   Pulse 76   Temp 36.4 °C (97.5 °F)   Resp 16     Physical Exam  Vitals reviewed.   Constitutional:       General: He is not in acute distress.     Comments: This is a well-developed and well-nourished male, sitting in a chair   HENT:      Right Ear: Tympanic membrane, ear canal and external ear normal.      Left Ear: Tympanic membrane, ear canal and external ear normal.   Eyes:      General: No scleral icterus.     Pupils: Pupils are equal, round, and reactive to light.   Neck:      Vascular: No carotid bruit.   Cardiovascular:      Heart sounds: Normal heart sounds, S1 normal and S2 normal. No murmur heard.     No friction rub.   Pulmonary:      Effort: Pulmonary effort is normal.      Breath sounds: Normal breath sounds and air entry.   Abdominal:      Palpations: There is no hepatomegaly, splenomegaly or mass.   Musculoskeletal:         General: No swelling or deformity. Normal range of motion.      Cervical back: Neck supple.      Right lower leg: No edema.      Left lower leg: No edema.   Lymphadenopathy:      Cervical: No cervical adenopathy.      Upper Body:      Right upper body: No axillary adenopathy.      Left upper body: No axillary adenopathy.      Lower Body: No right inguinal adenopathy. No left inguinal adenopathy.   Neurological:      Cranial Nerves: Cranial nerves 2-12  are intact. No cranial nerve deficit.      Sensory: No sensory deficit.      Motor: Motor function is intact. No weakness.      Gait: Gait is intact.      Deep Tendon Reflexes: Reflexes normal.      Comments: EXAMINATION: The patient is alert and oriented x2.   Psychiatric:         Mood and Affect: Mood normal. Mood is not anxious or depressed. Affect is not angry.         Behavior: Behavior is not agitated.         Thought Content: Thought content normal.         Judgment: Judgment normal.     LAB WORK: Laboratory studies were reviewed.    Assessment/Plan  Problem List Items Addressed This Visit             ICD-10-CM       Gastrointestinal and Abdominal    Gastroesophageal reflux disease without esophagitis K21.9       Genitourinary and Reproductive    Benign prostatic hyperplasia without urinary obstruction N40.0       Mental Health    Depression F32.A       Musculoskeletal and Injuries    Osteoarthritis M19.90       Neuro    Dementia (CMS/HCC) - Primary F03.90    Intellectual disability F79     Other Visit Diagnoses         Codes    Schizoaffective disorder, unspecified type (CMS/[facility])     F25.9    Hyperlipidemia, unspecified hyperlipidemia type     E78.5    Hypertension, unspecified type     I10        1. Dementia, unchanged.  2. Schizoaffective disorder, on medication.  3. Depression, on medication.  4. Hyperlipidemia, on statin.  5. Intellectual disability, on supportive care.  6. BPH, on tamsulosin.  7. Osteoarthritis, on Tylenol.  8. Reflux disease, on PPI.  9. Hypertension, med controlled.    Scribe Attestation  By signing my name below, IElizabeth Scribe attest that this documentation has been prepared under the direction and in the presence of Don Lozada MD.     All medical record entries made by the scribe were personally dictated by me I have reviewed the chart and agree the record accurately reflects my personal performance of his history physical examination and management      Electronically  Signed By: Don Lozada MD   4/9/24 12:03 AM

## 2024-04-08 VITALS
DIASTOLIC BLOOD PRESSURE: 78 MMHG | HEART RATE: 76 BPM | TEMPERATURE: 97.5 F | RESPIRATION RATE: 16 BRPM | SYSTOLIC BLOOD PRESSURE: 126 MMHG

## 2024-04-08 ASSESSMENT — ENCOUNTER SYMPTOMS
CHILLS: 0
FEVER: 0

## 2024-04-08 NOTE — PROGRESS NOTES
PLACE OF SERVICE:  [facility]    This is a subsequent visit.    Subjective   Patient ID: Anthony Blair is a 89 y.o. male who presents for Follow-up.    Mr. Anthony Blair is an 89-year-old male with history of Alzheimer's dementia with schizoaffective disorder.  He does have a history of intellectual disability.  He is unable to care for himself and requires supportive care    Review of Systems   Constitutional:  Negative for chills and fever.   Cardiovascular:  Negative for chest pain.   All other systems reviewed and are negative.    Objective   /78   Pulse 76   Temp 36.4 °C (97.5 °F)   Resp 16     Physical Exam  Vitals reviewed.   Constitutional:       General: He is not in acute distress.     Comments: This is a well-developed and well-nourished male, sitting in a chair   HENT:      Right Ear: Tympanic membrane, ear canal and external ear normal.      Left Ear: Tympanic membrane, ear canal and external ear normal.   Eyes:      General: No scleral icterus.     Pupils: Pupils are equal, round, and reactive to light.   Neck:      Vascular: No carotid bruit.   Cardiovascular:      Heart sounds: Normal heart sounds, S1 normal and S2 normal. No murmur heard.     No friction rub.   Pulmonary:      Effort: Pulmonary effort is normal.      Breath sounds: Normal breath sounds and air entry.   Abdominal:      Palpations: There is no hepatomegaly, splenomegaly or mass.   Musculoskeletal:         General: No swelling or deformity. Normal range of motion.      Cervical back: Neck supple.      Right lower leg: No edema.      Left lower leg: No edema.   Lymphadenopathy:      Cervical: No cervical adenopathy.      Upper Body:      Right upper body: No axillary adenopathy.      Left upper body: No axillary adenopathy.      Lower Body: No right inguinal adenopathy. No left inguinal adenopathy.   Neurological:      Cranial Nerves: Cranial nerves 2-12 are intact. No cranial nerve deficit.      Sensory: No sensory  deficit.      Motor: Motor function is intact. No weakness.      Gait: Gait is intact.      Deep Tendon Reflexes: Reflexes normal.      Comments: EXAMINATION: The patient is alert and oriented x2.   Psychiatric:         Mood and Affect: Mood normal. Mood is not anxious or depressed. Affect is not angry.         Behavior: Behavior is not agitated.         Thought Content: Thought content normal.         Judgment: Judgment normal.     LAB WORK: Laboratory studies were reviewed.    Assessment/Plan   Problem List Items Addressed This Visit             ICD-10-CM       Gastrointestinal and Abdominal    Gastroesophageal reflux disease without esophagitis K21.9       Genitourinary and Reproductive    Benign prostatic hyperplasia without urinary obstruction N40.0       Mental Health    Depression F32.A       Musculoskeletal and Injuries    Osteoarthritis M19.90       Neuro    Dementia (CMS/HCC) - Primary F03.90    Intellectual disability F79     Other Visit Diagnoses         Codes    Schizoaffective disorder, unspecified type (CMS/[facility])     F25.9    Hyperlipidemia, unspecified hyperlipidemia type     E78.5    Hypertension, unspecified type     I10        1. Dementia, unchanged.  2. Schizoaffective disorder, on medication.  3. Depression, on medication.  4. Hyperlipidemia, on statin.  5. Intellectual disability, on supportive care.  6. BPH, on tamsulosin.  7. Osteoarthritis, on Tylenol.  8. Reflux disease, on PPI.  9. Hypertension, med controlled.    Scribe Attestation  By signing my name below, IElizabeth Scribe attest that this documentation has been prepared under the direction and in the presence of Don Lozada MD.     All medical record entries made by the scribe were personally dictated by me I have reviewed the chart and agree the record accurately reflects my personal performance of his history physical examination and management

## 2024-05-04 ENCOUNTER — NURSING HOME VISIT (OUTPATIENT)
Dept: POST ACUTE CARE | Facility: EXTERNAL LOCATION | Age: 89
End: 2024-05-04
Payer: COMMERCIAL

## 2024-05-04 DIAGNOSIS — I10 HYPERTENSION, UNSPECIFIED TYPE: ICD-10-CM

## 2024-05-04 DIAGNOSIS — E78.5 HYPERLIPIDEMIA, UNSPECIFIED HYPERLIPIDEMIA TYPE: ICD-10-CM

## 2024-05-04 DIAGNOSIS — F32.A DEPRESSION, UNSPECIFIED DEPRESSION TYPE: ICD-10-CM

## 2024-05-04 DIAGNOSIS — M19.90 OSTEOARTHRITIS, UNSPECIFIED OSTEOARTHRITIS TYPE, UNSPECIFIED SITE: ICD-10-CM

## 2024-05-04 DIAGNOSIS — F79 INTELLECTUAL DISABILITY: ICD-10-CM

## 2024-05-04 DIAGNOSIS — F03.90 DEMENTIA, UNSPECIFIED DEMENTIA SEVERITY, UNSPECIFIED DEMENTIA TYPE, UNSPECIFIED WHETHER BEHAVIORAL, PSYCHOTIC, OR MOOD DISTURBANCE OR ANXIETY (MULTI): Primary | ICD-10-CM

## 2024-05-04 DIAGNOSIS — K21.9 GASTROESOPHAGEAL REFLUX DISEASE WITHOUT ESOPHAGITIS: ICD-10-CM

## 2024-05-04 DIAGNOSIS — F31.9 BIPOLAR AFFECTIVE DISORDER, REMISSION STATUS UNSPECIFIED (MULTI): ICD-10-CM

## 2024-05-04 DIAGNOSIS — N40.0 BENIGN PROSTATIC HYPERPLASIA WITHOUT URINARY OBSTRUCTION: ICD-10-CM

## 2024-05-04 DIAGNOSIS — G62.9 NEUROPATHY: ICD-10-CM

## 2024-05-04 PROCEDURE — 99308 SBSQ NF CARE LOW MDM 20: CPT | Performed by: INTERNAL MEDICINE

## 2024-05-04 NOTE — LETTER
Patient: Anthony Blair  : 1935    Encounter Date: 2024    PLACE OF SERVICE:  [facility]    This is a subsequent visit.    Subjective  Patient ID: Anthony Blair is a 89 y.o. male who presents for Follow-up.    Mr. Anthony Blair is an 89-year-old male with history of Alzheimer's dementia.  He suffers from bipolar disorder.  He is unable to care for himself and manage his affairs.  He requires supportive care.    Review of Systems   Constitutional:  Negative for chills and fever.   Cardiovascular:  Negative for chest pain.   All other systems reviewed and are negative.    Objective  /76   Pulse 74   Temp 36.6 °C (97.8 °F)   Resp 16     Physical Exam  Vitals reviewed.   Constitutional:       General: He is not in acute distress.     Comments: This is a well-developed and well-nourished male, sitting in a chair.   HENT:      Right Ear: Tympanic membrane, ear canal and external ear normal.      Left Ear: Tympanic membrane, ear canal and external ear normal.   Eyes:      General: No scleral icterus.     Pupils: Pupils are equal, round, and reactive to light.   Neck:      Vascular: No carotid bruit.   Cardiovascular:      Heart sounds: Normal heart sounds, S1 normal and S2 normal. No murmur heard.     No friction rub.   Pulmonary:      Effort: Pulmonary effort is normal.      Breath sounds: Normal breath sounds and air entry.   Abdominal:      Palpations: There is no hepatomegaly, splenomegaly or mass.   Musculoskeletal:         General: No swelling or deformity. Normal range of motion.      Cervical back: Neck supple.      Right lower leg: No edema.      Left lower leg: No edema.   Lymphadenopathy:      Cervical: No cervical adenopathy.      Upper Body:      Right upper body: No axillary adenopathy.      Left upper body: No axillary adenopathy.      Lower Body: No right inguinal adenopathy. No left inguinal adenopathy.   Neurological:      Cranial Nerves: Cranial nerves 2-12 are intact. No  cranial nerve deficit.      Sensory: No sensory deficit.      Motor: Motor function is intact. No weakness.      Gait: Gait is intact.      Deep Tendon Reflexes: Reflexes normal.      Comments: The patient is alert and oriented x2.   Psychiatric:         Mood and Affect: Mood normal. Mood is not anxious or depressed. Affect is not angry.         Behavior: Behavior is not agitated.         Thought Content: Thought content normal.         Judgment: Judgment normal.     LAB WORK: Laboratory studies reviewed.    Assessment/Plan  Problem List Items Addressed This Visit             ICD-10-CM       Gastrointestinal and Abdominal    Gastroesophageal reflux disease without esophagitis K21.9       Genitourinary and Reproductive    Benign prostatic hyperplasia without urinary obstruction N40.0       Mental Health    Depression F32.A       Musculoskeletal and Injuries    Osteoarthritis M19.90       Neuro    Dementia (Multi) - Primary F03.90    Intellectual disability F79     Other Visit Diagnoses         Codes    Bipolar affective disorder, remission status unspecified (Multi)     F31.9    Hypertension, unspecified type     I10    Neuropathy     G62.9    Hyperlipidemia, unspecified hyperlipidemia type     E78.5        1. Dementia, unchanged.  2. Bipolar disorder, on medication.  3. Depression, on medication.  4. BPH, on tamsulosin.  5. Reflux disease, on PPI.  6. Osteoarthritis, on Tylenol.  7. Intellectual disability, on supportive care.  8. Hypertension, med controlled.  9. Neuropathy, on gabapentin.  10. Hyperlipidemia, on statin.    Scribe Attestation  By signing my name below, IAntonette Scribe attest that this documentation has been prepared under the direction and in the presence of Don Lozada MD.       All medical record entries made by the scribe were personally dictated by me I have reviewed the chart and agree the record accurately reflects my personal performance of his history physical examination and  management      Electronically Signed By: Don Lozada MD   5/26/24  3:11 PM

## 2024-05-19 ENCOUNTER — NURSING HOME VISIT (OUTPATIENT)
Dept: POST ACUTE CARE | Facility: EXTERNAL LOCATION | Age: 89
End: 2024-05-19
Payer: COMMERCIAL

## 2024-05-19 DIAGNOSIS — R41.89 COGNITIVE DECLINE: ICD-10-CM

## 2024-05-19 DIAGNOSIS — K21.9 GASTROESOPHAGEAL REFLUX DISEASE WITHOUT ESOPHAGITIS: ICD-10-CM

## 2024-05-19 DIAGNOSIS — N40.0 BENIGN PROSTATIC HYPERPLASIA WITHOUT URINARY OBSTRUCTION: ICD-10-CM

## 2024-05-19 DIAGNOSIS — M19.90 OSTEOARTHRITIS, UNSPECIFIED OSTEOARTHRITIS TYPE, UNSPECIFIED SITE: ICD-10-CM

## 2024-05-19 DIAGNOSIS — F03.90 DEMENTIA, UNSPECIFIED DEMENTIA SEVERITY, UNSPECIFIED DEMENTIA TYPE, UNSPECIFIED WHETHER BEHAVIORAL, PSYCHOTIC, OR MOOD DISTURBANCE OR ANXIETY (MULTI): Primary | ICD-10-CM

## 2024-05-19 DIAGNOSIS — G62.9 NEUROPATHY: ICD-10-CM

## 2024-05-19 DIAGNOSIS — F79 INTELLECTUAL DISABILITY: ICD-10-CM

## 2024-05-19 DIAGNOSIS — I10 HYPERTENSION, UNSPECIFIED TYPE: ICD-10-CM

## 2024-05-19 DIAGNOSIS — F31.9 BIPOLAR AFFECTIVE DISORDER, REMISSION STATUS UNSPECIFIED (MULTI): ICD-10-CM

## 2024-05-19 DIAGNOSIS — E78.5 HYPERLIPIDEMIA, UNSPECIFIED HYPERLIPIDEMIA TYPE: ICD-10-CM

## 2024-05-19 PROCEDURE — 99308 SBSQ NF CARE LOW MDM 20: CPT | Performed by: INTERNAL MEDICINE

## 2024-05-19 NOTE — LETTER
Patient: Anthony Blair  : 1935    Encounter Date: 2024    PLACE OF SERVICE:  [facility].    This is a subsequent visit.    Subjective  Patient ID: Anthony Blair is a 89 y.o. male who presents for Follow-up.    Mr. Anthony Blair is an 89-year-old male with history of Alzheimer's dementia.  He suffers from bipolar disorder as well as cognitive decline.  He is unable to care for himself and requires supportive care.    Review of Systems   Constitutional:  Negative for chills and fever.   Cardiovascular:  Negative for chest pain.   All other systems reviewed and are negative.    Objective  /74   Pulse 76   Temp 36.5 °C (97.7 °F)   Resp 16     Physical Exam  Vitals reviewed.   Constitutional:       General: He is not in acute distress.     Comments: This is a well-developed, well-nourished male, sitting in a chair.   HENT:      Right Ear: Tympanic membrane, ear canal and external ear normal.      Left Ear: Tympanic membrane, ear canal and external ear normal.   Eyes:      General: No scleral icterus.     Pupils: Pupils are equal, round, and reactive to light.   Neck:      Vascular: No carotid bruit.   Cardiovascular:      Heart sounds: Normal heart sounds, S1 normal and S2 normal. No murmur heard.     No friction rub.   Pulmonary:      Effort: Pulmonary effort is normal.      Breath sounds: Normal breath sounds and air entry.   Abdominal:      Palpations: There is no hepatomegaly, splenomegaly or mass.   Musculoskeletal:         General: No swelling or deformity. Normal range of motion.      Cervical back: Neck supple.      Right lower leg: No edema.      Left lower leg: No edema.   Lymphadenopathy:      Cervical: No cervical adenopathy.      Upper Body:      Right upper body: No axillary adenopathy.      Left upper body: No axillary adenopathy.      Lower Body: No right inguinal adenopathy. No left inguinal adenopathy.   Neurological:      Mental Status: He is alert.      Cranial Nerves:  Cranial nerves 2-12 are intact. No cranial nerve deficit.      Sensory: No sensory deficit.      Motor: Motor function is intact. No weakness.      Gait: Gait is intact.      Deep Tendon Reflexes: Reflexes normal.      Comments: The patient is oriented x2.   Psychiatric:         Mood and Affect: Mood normal. Mood is not anxious or depressed. Affect is not angry.         Behavior: Behavior is not agitated.         Thought Content: Thought content normal.         Judgment: Judgment normal.     LAB WORK:  Laboratory studies were reviewed.    Assessment/Plan  Problem List Items Addressed This Visit             ICD-10-CM       Gastrointestinal and Abdominal    Gastroesophageal reflux disease without esophagitis K21.9       Genitourinary and Reproductive    Benign prostatic hyperplasia without urinary obstruction N40.0       Musculoskeletal and Injuries    Osteoarthritis M19.90       Neuro    Dementia (Multi) - Primary F03.90    Intellectual disability F79     Other Visit Diagnoses         Codes    Bipolar affective disorder, remission status unspecified (Multi)     F31.9    Hypertension, unspecified type     I10    Neuropathy     G62.9    Cognitive decline     R41.89    Hyperlipidemia, unspecified hyperlipidemia type     E78.5        1. Dementia, unchanged.  2. Bipolar disorder, on medication.  3. Intellectual disability, on supportive care.  4. BPH, on tamsulosin.  5. Reflux disease, on PPI.  6. Hypertension, medically controlled.  7. Osteoarthritis, on Tylenol.  8. Neuropathy, on gabapentin.  9. Cognitive decline, on supportive care.  10. Hyperlipidemia, on statin.    Scribe Attestation  By signing my name below, IAntonette Scribe attest that this documentation has been prepared under the direction and in the presence of Don Lozada MD.     All medical record entries made by the scribe were personally dictated by me I have reviewed the chart and agree the record accurately reflects my personal performance of  his history physical examination and management      Electronically Signed By: Don Lozada MD   5/26/24  2:21 PM

## 2024-05-20 VITALS
SYSTOLIC BLOOD PRESSURE: 126 MMHG | TEMPERATURE: 97.8 F | HEART RATE: 74 BPM | RESPIRATION RATE: 16 BRPM | DIASTOLIC BLOOD PRESSURE: 76 MMHG

## 2024-05-20 ASSESSMENT — ENCOUNTER SYMPTOMS
FEVER: 0
CHILLS: 0

## 2024-05-20 NOTE — PROGRESS NOTES
PLACE OF SERVICE:  [facility]    This is a subsequent visit.    Subjective   Patient ID: Anthony Blair is a 89 y.o. male who presents for Follow-up.    Mr. Anthony Blair is an 89-year-old male with history of Alzheimer's dementia.  He suffers from bipolar disorder.  He is unable to care for himself and manage his affairs.  He requires supportive care.    Review of Systems   Constitutional:  Negative for chills and fever.   Cardiovascular:  Negative for chest pain.   All other systems reviewed and are negative.    Objective   /76   Pulse 74   Temp 36.6 °C (97.8 °F)   Resp 16     Physical Exam  Vitals reviewed.   Constitutional:       General: He is not in acute distress.     Comments: This is a well-developed and well-nourished male, sitting in a chair.   HENT:      Right Ear: Tympanic membrane, ear canal and external ear normal.      Left Ear: Tympanic membrane, ear canal and external ear normal.   Eyes:      General: No scleral icterus.     Pupils: Pupils are equal, round, and reactive to light.   Neck:      Vascular: No carotid bruit.   Cardiovascular:      Heart sounds: Normal heart sounds, S1 normal and S2 normal. No murmur heard.     No friction rub.   Pulmonary:      Effort: Pulmonary effort is normal.      Breath sounds: Normal breath sounds and air entry.   Abdominal:      Palpations: There is no hepatomegaly, splenomegaly or mass.   Musculoskeletal:         General: No swelling or deformity. Normal range of motion.      Cervical back: Neck supple.      Right lower leg: No edema.      Left lower leg: No edema.   Lymphadenopathy:      Cervical: No cervical adenopathy.      Upper Body:      Right upper body: No axillary adenopathy.      Left upper body: No axillary adenopathy.      Lower Body: No right inguinal adenopathy. No left inguinal adenopathy.   Neurological:      Cranial Nerves: Cranial nerves 2-12 are intact. No cranial nerve deficit.      Sensory: No sensory deficit.      Motor:  Motor function is intact. No weakness.      Gait: Gait is intact.      Deep Tendon Reflexes: Reflexes normal.      Comments: The patient is alert and oriented x2.   Psychiatric:         Mood and Affect: Mood normal. Mood is not anxious or depressed. Affect is not angry.         Behavior: Behavior is not agitated.         Thought Content: Thought content normal.         Judgment: Judgment normal.     LAB WORK: Laboratory studies reviewed.    Assessment/Plan   Problem List Items Addressed This Visit             ICD-10-CM       Gastrointestinal and Abdominal    Gastroesophageal reflux disease without esophagitis K21.9       Genitourinary and Reproductive    Benign prostatic hyperplasia without urinary obstruction N40.0       Mental Health    Depression F32.A       Musculoskeletal and Injuries    Osteoarthritis M19.90       Neuro    Dementia (Multi) - Primary F03.90    Intellectual disability F79     Other Visit Diagnoses         Codes    Bipolar affective disorder, remission status unspecified (Multi)     F31.9    Hypertension, unspecified type     I10    Neuropathy     G62.9    Hyperlipidemia, unspecified hyperlipidemia type     E78.5        1. Dementia, unchanged.  2. Bipolar disorder, on medication.  3. Depression, on medication.  4. BPH, on tamsulosin.  5. Reflux disease, on PPI.  6. Osteoarthritis, on Tylenol.  7. Intellectual disability, on supportive care.  8. Hypertension, med controlled.  9. Neuropathy, on gabapentin.  10. Hyperlipidemia, on statin.    Scribe Attestation  By signing my name below, I, Xander Dawn attest that this documentation has been prepared under the direction and in the presence of Don Lozada MD.       All medical record entries made by the scribe were personally dictated by me I have reviewed the chart and agree the record accurately reflects my personal performance of his history physical examination and management

## 2024-05-23 VITALS
DIASTOLIC BLOOD PRESSURE: 74 MMHG | RESPIRATION RATE: 16 BRPM | TEMPERATURE: 97.7 F | SYSTOLIC BLOOD PRESSURE: 124 MMHG | HEART RATE: 76 BPM

## 2024-05-23 ASSESSMENT — ENCOUNTER SYMPTOMS
FEVER: 0
CHILLS: 0

## 2024-05-23 NOTE — PROGRESS NOTES
PLACE OF SERVICE:  [facility].    This is a subsequent visit.    Subjective   Patient ID: Anthony Blair is a 89 y.o. male who presents for Follow-up.    Mr. Anthony Blair is an 89-year-old male with history of Alzheimer's dementia.  He suffers from bipolar disorder as well as cognitive decline.  He is unable to care for himself and requires supportive care.    Review of Systems   Constitutional:  Negative for chills and fever.   Cardiovascular:  Negative for chest pain.   All other systems reviewed and are negative.    Objective   /74   Pulse 76   Temp 36.5 °C (97.7 °F)   Resp 16     Physical Exam  Vitals reviewed.   Constitutional:       General: He is not in acute distress.     Comments: This is a well-developed, well-nourished male, sitting in a chair.   HENT:      Right Ear: Tympanic membrane, ear canal and external ear normal.      Left Ear: Tympanic membrane, ear canal and external ear normal.   Eyes:      General: No scleral icterus.     Pupils: Pupils are equal, round, and reactive to light.   Neck:      Vascular: No carotid bruit.   Cardiovascular:      Heart sounds: Normal heart sounds, S1 normal and S2 normal. No murmur heard.     No friction rub.   Pulmonary:      Effort: Pulmonary effort is normal.      Breath sounds: Normal breath sounds and air entry.   Abdominal:      Palpations: There is no hepatomegaly, splenomegaly or mass.   Musculoskeletal:         General: No swelling or deformity. Normal range of motion.      Cervical back: Neck supple.      Right lower leg: No edema.      Left lower leg: No edema.   Lymphadenopathy:      Cervical: No cervical adenopathy.      Upper Body:      Right upper body: No axillary adenopathy.      Left upper body: No axillary adenopathy.      Lower Body: No right inguinal adenopathy. No left inguinal adenopathy.   Neurological:      Mental Status: He is alert.      Cranial Nerves: Cranial nerves 2-12 are intact. No cranial nerve deficit.      Sensory:  No sensory deficit.      Motor: Motor function is intact. No weakness.      Gait: Gait is intact.      Deep Tendon Reflexes: Reflexes normal.      Comments: The patient is oriented x2.   Psychiatric:         Mood and Affect: Mood normal. Mood is not anxious or depressed. Affect is not angry.         Behavior: Behavior is not agitated.         Thought Content: Thought content normal.         Judgment: Judgment normal.     LAB WORK:  Laboratory studies were reviewed.    Assessment/Plan   Problem List Items Addressed This Visit             ICD-10-CM       Gastrointestinal and Abdominal    Gastroesophageal reflux disease without esophagitis K21.9       Genitourinary and Reproductive    Benign prostatic hyperplasia without urinary obstruction N40.0       Musculoskeletal and Injuries    Osteoarthritis M19.90       Neuro    Dementia (Multi) - Primary F03.90    Intellectual disability F79     Other Visit Diagnoses         Codes    Bipolar affective disorder, remission status unspecified (Multi)     F31.9    Hypertension, unspecified type     I10    Neuropathy     G62.9    Cognitive decline     R41.89    Hyperlipidemia, unspecified hyperlipidemia type     E78.5        1. Dementia, unchanged.  2. Bipolar disorder, on medication.  3. Intellectual disability, on supportive care.  4. BPH, on tamsulosin.  5. Reflux disease, on PPI.  6. Hypertension, medically controlled.  7. Osteoarthritis, on Tylenol.  8. Neuropathy, on gabapentin.  9. Cognitive decline, on supportive care.  10. Hyperlipidemia, on statin.    Scribe Attestation  By signing my name below, I, Xander Dawn attest that this documentation has been prepared under the direction and in the presence of Don Lozada MD.     All medical record entries made by the scribe were personally dictated by me I have reviewed the chart and agree the record accurately reflects my personal performance of his history physical examination and management

## 2024-05-25 ENCOUNTER — NURSING HOME VISIT (OUTPATIENT)
Dept: POST ACUTE CARE | Facility: EXTERNAL LOCATION | Age: 89
End: 2024-05-25
Payer: COMMERCIAL

## 2024-05-25 DIAGNOSIS — D70.9 NEUTROPENIA, UNSPECIFIED TYPE (CMS-HCC): ICD-10-CM

## 2024-05-25 DIAGNOSIS — I25.10 ASHD (ARTERIOSCLEROTIC HEART DISEASE): ICD-10-CM

## 2024-05-25 DIAGNOSIS — S06.5X0A: ICD-10-CM

## 2024-05-25 DIAGNOSIS — N40.0 BENIGN PROSTATIC HYPERPLASIA WITHOUT URINARY OBSTRUCTION: ICD-10-CM

## 2024-05-25 DIAGNOSIS — F79 INTELLECTUAL DISABILITY: ICD-10-CM

## 2024-05-25 DIAGNOSIS — I49.5 SICK SINUS SYNDROME (MULTI): ICD-10-CM

## 2024-05-25 DIAGNOSIS — I10 HYPERTENSION, UNSPECIFIED TYPE: ICD-10-CM

## 2024-05-25 DIAGNOSIS — E78.5 HYPERLIPIDEMIA, UNSPECIFIED HYPERLIPIDEMIA TYPE: ICD-10-CM

## 2024-05-25 DIAGNOSIS — K21.9 GASTROESOPHAGEAL REFLUX DISEASE WITHOUT ESOPHAGITIS: ICD-10-CM

## 2024-05-25 DIAGNOSIS — F03.90 DEMENTIA, UNSPECIFIED DEMENTIA SEVERITY, UNSPECIFIED DEMENTIA TYPE, UNSPECIFIED WHETHER BEHAVIORAL, PSYCHOTIC, OR MOOD DISTURBANCE OR ANXIETY (MULTI): Primary | ICD-10-CM

## 2024-05-25 DIAGNOSIS — F31.9 BIPOLAR AFFECTIVE DISORDER, REMISSION STATUS UNSPECIFIED (MULTI): ICD-10-CM

## 2024-05-25 DIAGNOSIS — F32.A DEPRESSION, UNSPECIFIED DEPRESSION TYPE: ICD-10-CM

## 2024-05-25 DIAGNOSIS — M19.90 OSTEOARTHRITIS, UNSPECIFIED OSTEOARTHRITIS TYPE, UNSPECIFIED SITE: ICD-10-CM

## 2024-05-25 PROCEDURE — 99308 SBSQ NF CARE LOW MDM 20: CPT | Performed by: INTERNAL MEDICINE

## 2024-05-25 NOTE — LETTER
Patient: Anthony Blair  : 1935    Encounter Date: 2024    PLACE OF SERVICE:  [facility].    This is a subsequent visit.    Subjective  Patient ID: Anthony Blair is a 89 y.o. male who presents for Follow-up.    Mr. Anthony Blair is an 89-year-old male with history of Alzheimer's dementia.  He suffers from bipolar disorder as well as depression.  He is unable to care for himself and requires supportive care.    Review of Systems   Constitutional:  Negative for chills and fever.   Cardiovascular:  Negative for chest pain.   All other systems reviewed and are negative.    Objective  /76   Pulse 74   Temp 36.6 °C (97.9 °F)   Resp 16     Physical Exam  Vitals reviewed.   Constitutional:       General: He is not in acute distress.     Comments: This is well-developed, well-nourished male, sitting in chair.   HENT:      Right Ear: Tympanic membrane, ear canal and external ear normal.      Left Ear: Tympanic membrane, ear canal and external ear normal.   Eyes:      General: No scleral icterus.     Pupils: Pupils are equal, round, and reactive to light.   Neck:      Vascular: No carotid bruit.   Cardiovascular:      Heart sounds: Normal heart sounds, S1 normal and S2 normal. No murmur heard.     No friction rub.   Pulmonary:      Effort: Pulmonary effort is normal.      Breath sounds: Normal breath sounds and air entry.   Abdominal:      Palpations: There is no hepatomegaly, splenomegaly or mass.   Musculoskeletal:         General: No swelling or deformity. Normal range of motion.      Cervical back: Neck supple.      Right lower leg: No edema.      Left lower leg: No edema.   Lymphadenopathy:      Cervical: No cervical adenopathy.      Upper Body:      Right upper body: No axillary adenopathy.      Left upper body: No axillary adenopathy.      Lower Body: No right inguinal adenopathy. No left inguinal adenopathy.   Neurological:      Mental Status: He is alert.      Cranial Nerves: Cranial nerves  2-12 are intact. No cranial nerve deficit.      Sensory: No sensory deficit.      Motor: Motor function is intact. No weakness.      Gait: Gait is intact.      Deep Tendon Reflexes: Reflexes normal.      Comments: The patient is oriented x2.   Psychiatric:         Mood and Affect: Mood normal. Mood is not anxious or depressed. Affect is not angry.         Behavior: Behavior is not agitated.         Thought Content: Thought content normal.         Judgment: Judgment normal.     LAB WORK: Laboratory studies reviewed.    Assessment/Plan  Problem List Items Addressed This Visit             ICD-10-CM       Gastrointestinal and Abdominal    Gastroesophageal reflux disease without esophagitis K21.9       Genitourinary and Reproductive    Benign prostatic hyperplasia without urinary obstruction N40.0       Mental Health    Depression F32.A       Musculoskeletal and Injuries    Osteoarthritis M19.90       Neuro    Dementia (Multi) - Primary F03.90    Intellectual disability F79     Other Visit Diagnoses         Codes    Bipolar affective disorder, remission status unspecified (Multi)     F31.9    Hyperlipidemia, unspecified hyperlipidemia type     E78.5    ASHD (arteriosclerotic heart disease)     I25.10    Hypertension, unspecified type     I10        1. Dementia, unchanged.  2. Bipolar disorder, on medication.  3. Depression, on medication.  4. Hyperlipidemia, no statin.  5. ASHD, on aspirin.  6. Hypertension, med controlled.  7. Reflux disease, on PPI.  8. BPH, on tamsulosin.  9. Osteoarthritis, on Tylenol.  10. Intellectual disability, on supportive care.    Scribe Attestation  By signing my name below, IAntonette Scribe attest that this documentation has been prepared under the direction and in the presence of Don Lozada MD.       All medical record entries made by the scribe were personally dictated by me I have reviewed the chart and agree the record accurately reflects my personal performance of his history  physical examination and management      Electronically Signed By: Don Lozada MD   6/12/24 11:53 PM

## 2024-06-01 ENCOUNTER — NURSING HOME VISIT (OUTPATIENT)
Dept: POST ACUTE CARE | Facility: EXTERNAL LOCATION | Age: 89
End: 2024-06-01
Payer: COMMERCIAL

## 2024-06-01 DIAGNOSIS — E78.5 HYPERLIPIDEMIA, UNSPECIFIED HYPERLIPIDEMIA TYPE: ICD-10-CM

## 2024-06-01 DIAGNOSIS — N40.0 BENIGN PROSTATIC HYPERPLASIA WITHOUT URINARY OBSTRUCTION: ICD-10-CM

## 2024-06-01 DIAGNOSIS — F79 INTELLECTUAL DISABILITY: ICD-10-CM

## 2024-06-01 DIAGNOSIS — I25.10 ASHD (ARTERIOSCLEROTIC HEART DISEASE): ICD-10-CM

## 2024-06-01 DIAGNOSIS — D70.9 NEUTROPENIA, UNSPECIFIED TYPE (CMS-HCC): ICD-10-CM

## 2024-06-01 DIAGNOSIS — F32.A DEPRESSION, UNSPECIFIED DEPRESSION TYPE: ICD-10-CM

## 2024-06-01 DIAGNOSIS — I10 HYPERTENSION, UNSPECIFIED TYPE: ICD-10-CM

## 2024-06-01 DIAGNOSIS — S06.5X0A: ICD-10-CM

## 2024-06-01 DIAGNOSIS — G62.9 NEUROPATHY: ICD-10-CM

## 2024-06-01 DIAGNOSIS — I49.5 SICK SINUS SYNDROME (MULTI): ICD-10-CM

## 2024-06-01 DIAGNOSIS — K21.9 GASTROESOPHAGEAL REFLUX DISEASE WITHOUT ESOPHAGITIS: ICD-10-CM

## 2024-06-01 DIAGNOSIS — F31.9 BIPOLAR AFFECTIVE DISORDER, REMISSION STATUS UNSPECIFIED (MULTI): ICD-10-CM

## 2024-06-01 DIAGNOSIS — F03.90 DEMENTIA, UNSPECIFIED DEMENTIA SEVERITY, UNSPECIFIED DEMENTIA TYPE, UNSPECIFIED WHETHER BEHAVIORAL, PSYCHOTIC, OR MOOD DISTURBANCE OR ANXIETY (MULTI): Primary | ICD-10-CM

## 2024-06-01 PROCEDURE — 99309 SBSQ NF CARE MODERATE MDM 30: CPT | Performed by: INTERNAL MEDICINE

## 2024-06-01 NOTE — LETTER
Patient: Anthony Blair  : 1935    Encounter Date: 2024    PLACE OF SERVICE:  [facility], [place], operated by [facility].    This is a subsequent visit.    Subjective  Patient ID: Anthony Blair is a 89 y.o. male who presents for Follow-up.    Mr. Anthony Blair is an 89-year-old male with history of Alzheimer's dementia.  He is unable to care for himself and manage his affairs.  He requires supportive care.    Review of Systems   Constitutional:  Negative for chills and fever.   Cardiovascular:  Negative for chest pain.   All other systems reviewed and are negative.    Objective  /76   Pulse 72   Temp 36.6 °C (97.8 °F)   Resp 16     Physical Exam  Vitals reviewed.   Constitutional:       General: He is not in acute distress.     Comments: This is a well-developed, well-nourished male, sitting in chair.   HENT:      Right Ear: Tympanic membrane, ear canal and external ear normal.      Left Ear: Tympanic membrane, ear canal and external ear normal.   Eyes:      General: No scleral icterus.     Pupils: Pupils are equal, round, and reactive to light.   Neck:      Vascular: No carotid bruit.   Cardiovascular:      Heart sounds: Normal heart sounds, S1 normal and S2 normal. No murmur heard.     No friction rub.   Pulmonary:      Effort: Pulmonary effort is normal.      Breath sounds: Normal breath sounds and air entry.   Abdominal:      Palpations: There is no hepatomegaly, splenomegaly or mass.   Musculoskeletal:         General: No swelling or deformity. Normal range of motion.      Cervical back: Neck supple.      Right lower leg: No edema.      Left lower leg: No edema.   Lymphadenopathy:      Cervical: No cervical adenopathy.      Upper Body:      Right upper body: No axillary adenopathy.      Left upper body: No axillary adenopathy.      Lower Body: No right inguinal adenopathy. No left inguinal adenopathy.   Neurological:      Mental Status: He is alert.      Cranial Nerves: Cranial nerves 2-12 are intact. No cranial  nerve deficit.      Sensory: No sensory deficit.      Motor: Motor function is intact. No weakness.      Gait: Gait is intact.      Deep Tendon Reflexes: Reflexes normal.      Comments: The patient is oriented x2.   Psychiatric:         Mood and Affect: Mood normal. Mood is not anxious or depressed. Affect is not angry.         Behavior: Behavior is not agitated.         Thought Content: Thought content normal.         Judgment: Judgment normal.     LAB WORK: Laboratory studies reviewed.    Assessment/Plan  Problem List Items Addressed This Visit             ICD-10-CM       Gastrointestinal and Abdominal    Gastroesophageal reflux disease without esophagitis K21.9       Genitourinary and Reproductive    Benign prostatic hyperplasia without urinary obstruction N40.0       Mental Health    Depression F32.A       Neuro    Dementia (Multi) - Primary F03.90    Intellectual disability F79     Other Visit Diagnoses         Codes    Bipolar affective disorder, remission status unspecified (Multi)     F31.9    Hypertension, unspecified type     I10    Neuropathy     G62.9    Hyperlipidemia, unspecified hyperlipidemia type     E78.5    ASHD (arteriosclerotic heart disease)     I25.10        1. Dementia, unchanged.  2. Intellectual disability, on supportive care.  3. Bipolar disorder, on medication.  4. Depression, on medication.  5. BPH, on tamsulosin.  6. Reflux disease, on PPI.  7. Hypertension, med controlled.  8. Neuropathy, on gabapentin.  9. Hyperlipidemia, on statin.  10. ASHD, on aspirin.    Scribe Attestation  By signing my name below, IAntonette Scribe attest that this documentation has been prepared under the direction and in the presence of Don Lozada MD.     All medical record entries made by the scribe were personally dictated by me I have reviewed the chart and agree the record accurately reflects my personal performance of his history physical examination and management      Electronically Signed By:  Don Lozada MD   6/12/24 11:49 PM

## 2024-06-08 ENCOUNTER — NURSING HOME VISIT (OUTPATIENT)
Dept: POST ACUTE CARE | Facility: EXTERNAL LOCATION | Age: 89
End: 2024-06-08
Payer: COMMERCIAL

## 2024-06-08 DIAGNOSIS — R41.89 COGNITIVE DECLINE: ICD-10-CM

## 2024-06-08 DIAGNOSIS — Z91.81 AT RISK FOR FALLS: ICD-10-CM

## 2024-06-08 DIAGNOSIS — N40.0 BENIGN PROSTATIC HYPERPLASIA WITHOUT URINARY OBSTRUCTION: ICD-10-CM

## 2024-06-08 DIAGNOSIS — F03.90 DEMENTIA, UNSPECIFIED DEMENTIA SEVERITY, UNSPECIFIED DEMENTIA TYPE, UNSPECIFIED WHETHER BEHAVIORAL, PSYCHOTIC, OR MOOD DISTURBANCE OR ANXIETY (MULTI): Primary | ICD-10-CM

## 2024-06-08 DIAGNOSIS — F79 INTELLECTUAL DISABILITY: ICD-10-CM

## 2024-06-08 DIAGNOSIS — F32.A DEPRESSION, UNSPECIFIED DEPRESSION TYPE: ICD-10-CM

## 2024-06-08 DIAGNOSIS — K21.9 GASTROESOPHAGEAL REFLUX DISEASE WITHOUT ESOPHAGITIS: ICD-10-CM

## 2024-06-08 DIAGNOSIS — I10 HYPERTENSION, UNSPECIFIED TYPE: ICD-10-CM

## 2024-06-08 DIAGNOSIS — F31.9 BIPOLAR AFFECTIVE DISORDER, REMISSION STATUS UNSPECIFIED (MULTI): ICD-10-CM

## 2024-06-08 DIAGNOSIS — R53.1 WEAKNESS: ICD-10-CM

## 2024-06-08 DIAGNOSIS — E78.5 HYPERLIPIDEMIA, UNSPECIFIED HYPERLIPIDEMIA TYPE: ICD-10-CM

## 2024-06-08 PROCEDURE — 99308 SBSQ NF CARE LOW MDM 20: CPT | Performed by: INTERNAL MEDICINE

## 2024-06-08 NOTE — LETTER
Patient: Anthony Blair  : 1935    Encounter Date: 2024    PLACE OF SERVICE:  [facility].    This is a subsequent visit.    Subjective  Patient ID: Anthony Blair is a 89 y.o. male who presents for Follow-up.    Mr. Anthony Blair is an 89-year-old male with history of dementia with depression, bipolar disorder.  He is unable to care for himself.  He requires supportive care.    Review of Systems   Constitutional:  Negative for chills and fever.   Cardiovascular:  Negative for chest pain.   All other systems reviewed and are negative.    Objective  /76   Pulse 74   Temp 36.4 °C (97.6 °F)   Resp 16     Physical Exam  Vitals reviewed.   Constitutional:       General: He is not in acute distress.     Comments: This is a well-developed, well-nourished male, sitting in a chair.   HENT:      Right Ear: Tympanic membrane, ear canal and external ear normal.      Left Ear: Tympanic membrane, ear canal and external ear normal.   Eyes:      General: No scleral icterus.     Pupils: Pupils are equal, round, and reactive to light.   Neck:      Vascular: No carotid bruit.   Cardiovascular:      Heart sounds: Normal heart sounds, S1 normal and S2 normal. No murmur heard.     No friction rub.   Pulmonary:      Effort: Pulmonary effort is normal.      Breath sounds: Normal breath sounds and air entry.   Abdominal:      Palpations: There is no hepatomegaly, splenomegaly or mass.   Musculoskeletal:         General: No swelling or deformity. Normal range of motion.      Cervical back: Neck supple.      Right lower leg: No edema.      Left lower leg: No edema.   Lymphadenopathy:      Cervical: No cervical adenopathy.      Upper Body:      Right upper body: No axillary adenopathy.      Left upper body: No axillary adenopathy.      Lower Body: No right inguinal adenopathy. No left inguinal adenopathy.   Neurological:      Mental Status: He is alert.      Cranial Nerves: Cranial nerves 2-12 are intact. No cranial  nerve deficit.      Sensory: No sensory deficit.      Motor: Motor function is intact. No weakness.      Gait: Gait is intact.      Deep Tendon Reflexes: Reflexes normal.      Comments: The patient is oriented x2.   Psychiatric:         Mood and Affect: Mood normal. Mood is not anxious or depressed. Affect is not angry.         Behavior: Behavior is not agitated.         Thought Content: Thought content normal.         Judgment: Judgment normal.     LAB WORK: Laboratory studies were reviewed.    Assessment/Plan  Problem List Items Addressed This Visit             ICD-10-CM       Advance Directives and General Issues    At risk for falls Z91.81       Gastrointestinal and Abdominal    Gastroesophageal reflux disease without esophagitis K21.9       Genitourinary and Reproductive    Benign prostatic hyperplasia without urinary obstruction N40.0       Mental Health    Depression F32.A       Neuro    Dementia (Multi) - Primary F03.90    Intellectual disability F79     Other Visit Diagnoses         Codes    Bipolar affective disorder, remission status unspecified (Multi)     F31.9    Cognitive decline     R41.89    Weakness     R53.1    Hyperlipidemia, unspecified hyperlipidemia type     E78.5    Hypertension, unspecified type     I10        1. Dementia, unchanged.  2. Bipolar disorder, on medication.  3. Cognitive decline, on supportive care.  4. Weakness, on PT/OT.  5. Fall risk, on fall precautions.  6. Depression, on medication.  7. Hyperlipidemia, on statin.  8. Hypertension, med controlled.  9. lntellectual disability, on supportive care.  10. Reflux disease, on PPI.  11. BPH, on tamsulosin.    Scribe Attestation  By signing my name below, IAntonette Scribe attest that this documentation has been prepared under the direction and in the presence of Don Lozada MD.     All medical record entries made by the scribe were personally dictated by me I have reviewed the chart and agree the record accurately reflects  my personal performance of his history physical examination and management      Electronically Signed By: Don Lozada MD   6/15/24 10:05 PM

## 2024-06-10 VITALS
SYSTOLIC BLOOD PRESSURE: 120 MMHG | RESPIRATION RATE: 16 BRPM | HEART RATE: 74 BPM | DIASTOLIC BLOOD PRESSURE: 76 MMHG | TEMPERATURE: 97.9 F

## 2024-06-10 VITALS
DIASTOLIC BLOOD PRESSURE: 76 MMHG | SYSTOLIC BLOOD PRESSURE: 126 MMHG | TEMPERATURE: 97.8 F | HEART RATE: 72 BPM | RESPIRATION RATE: 16 BRPM

## 2024-06-10 ASSESSMENT — ENCOUNTER SYMPTOMS
CHILLS: 0
CHILLS: 0
FEVER: 0
FEVER: 0

## 2024-06-10 NOTE — PROGRESS NOTES
PLACE OF SERVICE:  [facility].    This is a subsequent visit.    Subjective   Patient ID: Anthony Blair is a 89 y.o. male who presents for Follow-up.    Mr. Anthony Blair is an 89-year-old male with history of Alzheimer's dementia.  He suffers from bipolar disorder as well as depression.  He is unable to care for himself and requires supportive care.    Review of Systems   Constitutional:  Negative for chills and fever.   Cardiovascular:  Negative for chest pain.   All other systems reviewed and are negative.    Objective   /76   Pulse 74   Temp 36.6 °C (97.9 °F)   Resp 16     Physical Exam  Vitals reviewed.   Constitutional:       General: He is not in acute distress.     Comments: This is well-developed, well-nourished male, sitting in chair.   HENT:      Right Ear: Tympanic membrane, ear canal and external ear normal.      Left Ear: Tympanic membrane, ear canal and external ear normal.   Eyes:      General: No scleral icterus.     Pupils: Pupils are equal, round, and reactive to light.   Neck:      Vascular: No carotid bruit.   Cardiovascular:      Heart sounds: Normal heart sounds, S1 normal and S2 normal. No murmur heard.     No friction rub.   Pulmonary:      Effort: Pulmonary effort is normal.      Breath sounds: Normal breath sounds and air entry.   Abdominal:      Palpations: There is no hepatomegaly, splenomegaly or mass.   Musculoskeletal:         General: No swelling or deformity. Normal range of motion.      Cervical back: Neck supple.      Right lower leg: No edema.      Left lower leg: No edema.   Lymphadenopathy:      Cervical: No cervical adenopathy.      Upper Body:      Right upper body: No axillary adenopathy.      Left upper body: No axillary adenopathy.      Lower Body: No right inguinal adenopathy. No left inguinal adenopathy.   Neurological:      Mental Status: He is alert.      Cranial Nerves: Cranial nerves 2-12 are intact. No cranial nerve deficit.      Sensory: No sensory  deficit.      Motor: Motor function is intact. No weakness.      Gait: Gait is intact.      Deep Tendon Reflexes: Reflexes normal.      Comments: The patient is oriented x2.   Psychiatric:         Mood and Affect: Mood normal. Mood is not anxious or depressed. Affect is not angry.         Behavior: Behavior is not agitated.         Thought Content: Thought content normal.         Judgment: Judgment normal.     LAB WORK: Laboratory studies reviewed.    Assessment/Plan   Problem List Items Addressed This Visit             ICD-10-CM       Gastrointestinal and Abdominal    Gastroesophageal reflux disease without esophagitis K21.9       Genitourinary and Reproductive    Benign prostatic hyperplasia without urinary obstruction N40.0       Mental Health    Depression F32.A       Musculoskeletal and Injuries    Osteoarthritis M19.90       Neuro    Dementia (Multi) - Primary F03.90    Intellectual disability F79     Other Visit Diagnoses         Codes    Bipolar affective disorder, remission status unspecified (Multi)     F31.9    Hyperlipidemia, unspecified hyperlipidemia type     E78.5    ASHD (arteriosclerotic heart disease)     I25.10    Hypertension, unspecified type     I10        1. Dementia, unchanged.  2. Bipolar disorder, on medication.  3. Depression, on medication.  4. Hyperlipidemia, no statin.  5. ASHD, on aspirin.  6. Hypertension, med controlled.  7. Reflux disease, on PPI.  8. BPH, on tamsulosin.  9. Osteoarthritis, on Tylenol.  10. Intellectual disability, on supportive care.    Scribe Attestation  By signing my name below, IAntonette Scribe attest that this documentation has been prepared under the direction and in the presence of Don Lozada MD.       All medical record entries made by the scribe were personally dictated by me I have reviewed the chart and agree the record accurately reflects my personal performance of his history physical examination and management

## 2024-06-10 NOTE — PROGRESS NOTES
PLACE OF SERVICE:  [facility].    This is a subsequent visit.    Subjective   Patient ID: Anthony Blair is a 89 y.o. male who presents for Follow-up.    Mr. Anthony Blair is an 89-year-old male with history of Alzheimer's dementia.  He is unable to care for himself and manage his affairs.  He requires supportive care.    Review of Systems   Constitutional:  Negative for chills and fever.   Cardiovascular:  Negative for chest pain.   All other systems reviewed and are negative.    Objective   /76   Pulse 72   Temp 36.6 °C (97.8 °F)   Resp 16     Physical Exam  Vitals reviewed.   Constitutional:       General: He is not in acute distress.     Comments: This is a well-developed, well-nourished male, sitting in chair.   HENT:      Right Ear: Tympanic membrane, ear canal and external ear normal.      Left Ear: Tympanic membrane, ear canal and external ear normal.   Eyes:      General: No scleral icterus.     Pupils: Pupils are equal, round, and reactive to light.   Neck:      Vascular: No carotid bruit.   Cardiovascular:      Heart sounds: Normal heart sounds, S1 normal and S2 normal. No murmur heard.     No friction rub.   Pulmonary:      Effort: Pulmonary effort is normal.      Breath sounds: Normal breath sounds and air entry.   Abdominal:      Palpations: There is no hepatomegaly, splenomegaly or mass.   Musculoskeletal:         General: No swelling or deformity. Normal range of motion.      Cervical back: Neck supple.      Right lower leg: No edema.      Left lower leg: No edema.   Lymphadenopathy:      Cervical: No cervical adenopathy.      Upper Body:      Right upper body: No axillary adenopathy.      Left upper body: No axillary adenopathy.      Lower Body: No right inguinal adenopathy. No left inguinal adenopathy.   Neurological:      Mental Status: He is alert.      Cranial Nerves: Cranial nerves 2-12 are intact. No cranial nerve deficit.      Sensory: No sensory deficit.      Motor: Motor  function is intact. No weakness.      Gait: Gait is intact.      Deep Tendon Reflexes: Reflexes normal.      Comments: The patient is oriented x2.   Psychiatric:         Mood and Affect: Mood normal. Mood is not anxious or depressed. Affect is not angry.         Behavior: Behavior is not agitated.         Thought Content: Thought content normal.         Judgment: Judgment normal.     LAB WORK: Laboratory studies reviewed.    Assessment/Plan   Problem List Items Addressed This Visit             ICD-10-CM       Gastrointestinal and Abdominal    Gastroesophageal reflux disease without esophagitis K21.9       Genitourinary and Reproductive    Benign prostatic hyperplasia without urinary obstruction N40.0       Mental Health    Depression F32.A       Neuro    Dementia (Multi) - Primary F03.90    Intellectual disability F79     Other Visit Diagnoses         Codes    Bipolar affective disorder, remission status unspecified (Multi)     F31.9    Hypertension, unspecified type     I10    Neuropathy     G62.9    Hyperlipidemia, unspecified hyperlipidemia type     E78.5    ASHD (arteriosclerotic heart disease)     I25.10        1. Dementia, unchanged.  2. Intellectual disability, on supportive care.  3. Bipolar disorder, on medication.  4. Depression, on medication.  5. BPH, on tamsulosin.  6. Reflux disease, on PPI.  7. Hypertension, med controlled.  8. Neuropathy, on gabapentin.  9. Hyperlipidemia, on statin.  10. ASHD, on aspirin.    Scribe Attestation  By signing my name below, I, Xander Dawn attest that this documentation has been prepared under the direction and in the presence of Don Lozada MD.     All medical record entries made by the scribe were personally dictated by me I have reviewed the chart and agree the record accurately reflects my personal performance of his history physical examination and management

## 2024-06-12 PROBLEM — D70.9 NEUTROPENIA, UNSPECIFIED TYPE (CMS-HCC): Status: ACTIVE | Noted: 2024-06-12

## 2024-06-12 PROBLEM — S06.5X0A: Status: ACTIVE | Noted: 2024-06-12

## 2024-06-12 PROBLEM — I49.5 SICK SINUS SYNDROME (MULTI): Status: ACTIVE | Noted: 2024-06-12

## 2024-06-15 VITALS
SYSTOLIC BLOOD PRESSURE: 120 MMHG | HEART RATE: 74 BPM | TEMPERATURE: 97.6 F | DIASTOLIC BLOOD PRESSURE: 76 MMHG | RESPIRATION RATE: 16 BRPM

## 2024-06-15 ASSESSMENT — ENCOUNTER SYMPTOMS
FEVER: 0
CHILLS: 0

## 2024-06-15 NOTE — PROGRESS NOTES
PLACE OF SERVICE:  [facility].    This is a subsequent visit.    Subjective   Patient ID: Anthony Blair is a 89 y.o. male who presents for Follow-up.    Mr. Anthony Blair is an 89-year-old male with history of dementia with depression, bipolar disorder.  He is unable to care for himself.  He requires supportive care.    Review of Systems   Constitutional:  Negative for chills and fever.   Cardiovascular:  Negative for chest pain.   All other systems reviewed and are negative.    Objective   /76   Pulse 74   Temp 36.4 °C (97.6 °F)   Resp 16     Physical Exam  Vitals reviewed.   Constitutional:       General: He is not in acute distress.     Comments: This is a well-developed, well-nourished male, sitting in a chair.   HENT:      Right Ear: Tympanic membrane, ear canal and external ear normal.      Left Ear: Tympanic membrane, ear canal and external ear normal.   Eyes:      General: No scleral icterus.     Pupils: Pupils are equal, round, and reactive to light.   Neck:      Vascular: No carotid bruit.   Cardiovascular:      Heart sounds: Normal heart sounds, S1 normal and S2 normal. No murmur heard.     No friction rub.   Pulmonary:      Effort: Pulmonary effort is normal.      Breath sounds: Normal breath sounds and air entry.   Abdominal:      Palpations: There is no hepatomegaly, splenomegaly or mass.   Musculoskeletal:         General: No swelling or deformity. Normal range of motion.      Cervical back: Neck supple.      Right lower leg: No edema.      Left lower leg: No edema.   Lymphadenopathy:      Cervical: No cervical adenopathy.      Upper Body:      Right upper body: No axillary adenopathy.      Left upper body: No axillary adenopathy.      Lower Body: No right inguinal adenopathy. No left inguinal adenopathy.   Neurological:      Mental Status: He is alert.      Cranial Nerves: Cranial nerves 2-12 are intact. No cranial nerve deficit.      Sensory: No sensory deficit.      Motor: Motor  function is intact. No weakness.      Gait: Gait is intact.      Deep Tendon Reflexes: Reflexes normal.      Comments: The patient is oriented x2.   Psychiatric:         Mood and Affect: Mood normal. Mood is not anxious or depressed. Affect is not angry.         Behavior: Behavior is not agitated.         Thought Content: Thought content normal.         Judgment: Judgment normal.     LAB WORK: Laboratory studies were reviewed.    Assessment/Plan   Problem List Items Addressed This Visit             ICD-10-CM       Advance Directives and General Issues    At risk for falls Z91.81       Gastrointestinal and Abdominal    Gastroesophageal reflux disease without esophagitis K21.9       Genitourinary and Reproductive    Benign prostatic hyperplasia without urinary obstruction N40.0       Mental Health    Depression F32.A       Neuro    Dementia (Multi) - Primary F03.90    Intellectual disability F79     Other Visit Diagnoses         Codes    Bipolar affective disorder, remission status unspecified (Multi)     F31.9    Cognitive decline     R41.89    Weakness     R53.1    Hyperlipidemia, unspecified hyperlipidemia type     E78.5    Hypertension, unspecified type     I10        1. Dementia, unchanged.  2. Bipolar disorder, on medication.  3. Cognitive decline, on supportive care.  4. Weakness, on PT/OT.  5. Fall risk, on fall precautions.  6. Depression, on medication.  7. Hyperlipidemia, on statin.  8. Hypertension, med controlled.  9. lntellectual disability, on supportive care.  10. Reflux disease, on PPI.  11. BPH, on tamsulosin.    Scribe Attestation  By signing my name below, IAntonette Scribe attest that this documentation has been prepared under the direction and in the presence of Don Lozada MD.     All medical record entries made by the [email or web address] were personally dictated by me I have reviewed the chart and agree the record accurately reflects my personal performance of his history physical examination and  management

## 2024-07-04 ENCOUNTER — NURSING HOME VISIT (OUTPATIENT)
Dept: POST ACUTE CARE | Facility: EXTERNAL LOCATION | Age: 89
End: 2024-07-04
Payer: COMMERCIAL

## 2024-07-04 DIAGNOSIS — F32.A DEPRESSION, UNSPECIFIED DEPRESSION TYPE: ICD-10-CM

## 2024-07-04 DIAGNOSIS — N40.0 BENIGN PROSTATIC HYPERPLASIA WITHOUT URINARY OBSTRUCTION: ICD-10-CM

## 2024-07-04 DIAGNOSIS — F79 INTELLECTUAL DISABILITY: ICD-10-CM

## 2024-07-04 DIAGNOSIS — Z91.81 AT RISK FOR FALLS: ICD-10-CM

## 2024-07-04 DIAGNOSIS — F03.90 DEMENTIA, UNSPECIFIED DEMENTIA SEVERITY, UNSPECIFIED DEMENTIA TYPE, UNSPECIFIED WHETHER BEHAVIORAL, PSYCHOTIC, OR MOOD DISTURBANCE OR ANXIETY (MULTI): Primary | ICD-10-CM

## 2024-07-04 DIAGNOSIS — K21.9 GASTROESOPHAGEAL REFLUX DISEASE WITHOUT ESOPHAGITIS: ICD-10-CM

## 2024-07-04 DIAGNOSIS — I10 HYPERTENSION, UNSPECIFIED TYPE: ICD-10-CM

## 2024-07-04 DIAGNOSIS — E78.5 HYPERLIPIDEMIA, UNSPECIFIED HYPERLIPIDEMIA TYPE: ICD-10-CM

## 2024-07-04 DIAGNOSIS — F31.9 BIPOLAR AFFECTIVE DISORDER, REMISSION STATUS UNSPECIFIED (MULTI): ICD-10-CM

## 2024-07-04 DIAGNOSIS — M19.90 OSTEOARTHRITIS, UNSPECIFIED OSTEOARTHRITIS TYPE, UNSPECIFIED SITE: ICD-10-CM

## 2024-07-04 DIAGNOSIS — R53.1 WEAKNESS: ICD-10-CM

## 2024-07-04 PROCEDURE — 99309 SBSQ NF CARE MODERATE MDM 30: CPT | Performed by: INTERNAL MEDICINE

## 2024-07-04 NOTE — LETTER
Patient: Anthony Blair  : 1935    Encounter Date: 2024    PLACE OF SERVICE:  [facility]    This is a subsequent visit.    Subjective  Patient ID: Anthony Blair is a 89 y.o. male who presents for Follow-up.    Mr. Anthony Blair is an 89-year-old male with history of Alzheimer's dementia.  He suffers from bipolar disorder with cognitive decline.  He is unable to care for himself and requires supportive care.    Review of Systems   Constitutional:  Negative for chills and fever.   Cardiovascular:  Negative for chest pain.   All other systems reviewed and are negative.    Objective  /80   Pulse 74   Temp 36.5 °C (97.7 °F)   Resp 16     Physical Exam  Vitals reviewed.   Constitutional:       General: He is not in acute distress.     Comments: This is a well-developed, well-nourished male, sitting in a chair   HENT:      Right Ear: Tympanic membrane, ear canal and external ear normal.      Left Ear: Tympanic membrane, ear canal and external ear normal.   Eyes:      General: No scleral icterus.     Pupils: Pupils are equal, round, and reactive to light.   Neck:      Vascular: No carotid bruit.   Cardiovascular:      Heart sounds: Normal heart sounds, S1 normal and S2 normal. No murmur heard.     No friction rub.   Pulmonary:      Effort: Pulmonary effort is normal.      Breath sounds: Normal breath sounds and air entry.   Abdominal:      Palpations: There is no hepatomegaly, splenomegaly or mass.   Musculoskeletal:         General: No swelling or deformity. Normal range of motion.      Cervical back: Neck supple.      Right lower leg: No edema.      Left lower leg: No edema.   Lymphadenopathy:      Cervical: No cervical adenopathy.      Upper Body:      Right upper body: No axillary adenopathy.      Left upper body: No axillary adenopathy.      Lower Body: No right inguinal adenopathy. No left inguinal adenopathy.   Neurological:      Mental Status: He is alert.      Cranial Nerves: Cranial  nerves 2-12 are intact. No cranial nerve deficit.      Sensory: No sensory deficit.      Motor: Motor function is intact. No weakness.      Gait: Gait is intact.      Deep Tendon Reflexes: Reflexes normal.      Comments: The patient is oriented x2.   Psychiatric:         Mood and Affect: Mood normal. Mood is not anxious or depressed. Affect is not angry.         Behavior: Behavior is not agitated.         Thought Content: Thought content normal.         Judgment: Judgment normal.     LAB WORK:  Laboratory studies were reviewed.    Assessment/Plan  Problem List Items Addressed This Visit             ICD-10-CM       Advance Directives and General Issues    At risk for falls Z91.81       Gastrointestinal and Abdominal    Gastroesophageal reflux disease without esophagitis K21.9       Genitourinary and Reproductive    Benign prostatic hyperplasia without urinary obstruction N40.0       Mental Health    Depression F32.A       Musculoskeletal and Injuries    Osteoarthritis M19.90       Neuro    Dementia (Multi) - Primary F03.90    Intellectual disability F79     Other Visit Diagnoses         Codes    Bipolar affective disorder, remission status unspecified (Multi)     F31.9    Weakness     R53.1    Hypertension, unspecified type     I10    Hyperlipidemia, unspecified hyperlipidemia type     E78.5        1. Dementia, unchanged.  2. Bipolar disorder, on medication.  3. Weakness, on PT/OT.  4. Fall risk, on fall precautions.  5. Hypertension, medically controlled.  6. Intellectual disability, on supportive care.  7. Reflux disease, on PPI.  8. BPH, on tamsulosin.  9. Osteoarthritis, on Tylenol.  10. Depression, on medication.  11. Hyperlipidemia, on statin.    Scribe Attestation  By signing my name below, IElizabeth Scribe attest that this documentation has been prepared under the direction and in the presence of Don Lozada MD.     All medical record entries made by the scribe were personally dictated by me I have  reviewed the chart and agree the record accurately reflects my personal performance of his history physical examination and management      Electronically Signed By: Don Lozada MD   7/11/24  1:25 AM

## 2024-07-08 VITALS
DIASTOLIC BLOOD PRESSURE: 80 MMHG | RESPIRATION RATE: 16 BRPM | HEART RATE: 74 BPM | SYSTOLIC BLOOD PRESSURE: 124 MMHG | TEMPERATURE: 97.7 F

## 2024-07-08 ASSESSMENT — ENCOUNTER SYMPTOMS
CHILLS: 0
FEVER: 0

## 2024-07-08 NOTE — PROGRESS NOTES
PLACE OF SERVICE:  [facility]    This is a subsequent visit.    Subjective   Patient ID: Anthony Blair is a 89 y.o. male who presents for Follow-up.    Mr. Anthony Blair is an 89-year-old male with history of Alzheimer's dementia.  He suffers from bipolar disorder with cognitive decline.  He is unable to care for himself and requires supportive care.    Review of Systems   Constitutional:  Negative for chills and fever.   Cardiovascular:  Negative for chest pain.   All other systems reviewed and are negative.    Objective   /80   Pulse 74   Temp 36.5 °C (97.7 °F)   Resp 16     Physical Exam  Vitals reviewed.   Constitutional:       General: He is not in acute distress.     Comments: This is a well-developed, well-nourished male, sitting in a chair   HENT:      Right Ear: Tympanic membrane, ear canal and external ear normal.      Left Ear: Tympanic membrane, ear canal and external ear normal.   Eyes:      General: No scleral icterus.     Pupils: Pupils are equal, round, and reactive to light.   Neck:      Vascular: No carotid bruit.   Cardiovascular:      Heart sounds: Normal heart sounds, S1 normal and S2 normal. No murmur heard.     No friction rub.   Pulmonary:      Effort: Pulmonary effort is normal.      Breath sounds: Normal breath sounds and air entry.   Abdominal:      Palpations: There is no hepatomegaly, splenomegaly or mass.   Musculoskeletal:         General: No swelling or deformity. Normal range of motion.      Cervical back: Neck supple.      Right lower leg: No edema.      Left lower leg: No edema.   Lymphadenopathy:      Cervical: No cervical adenopathy.      Upper Body:      Right upper body: No axillary adenopathy.      Left upper body: No axillary adenopathy.      Lower Body: No right inguinal adenopathy. No left inguinal adenopathy.   Neurological:      Mental Status: He is alert.      Cranial Nerves: Cranial nerves 2-12 are intact. No cranial nerve deficit.      Sensory: No  sensory deficit.      Motor: Motor function is intact. No weakness.      Gait: Gait is intact.      Deep Tendon Reflexes: Reflexes normal.      Comments: The patient is oriented x2.   Psychiatric:         Mood and Affect: Mood normal. Mood is not anxious or depressed. Affect is not angry.         Behavior: Behavior is not agitated.         Thought Content: Thought content normal.         Judgment: Judgment normal.     LAB WORK:  Laboratory studies were reviewed.    Assessment/Plan   Problem List Items Addressed This Visit             ICD-10-CM       Advance Directives and General Issues    At risk for falls Z91.81       Gastrointestinal and Abdominal    Gastroesophageal reflux disease without esophagitis K21.9       Genitourinary and Reproductive    Benign prostatic hyperplasia without urinary obstruction N40.0       Mental Health    Depression F32.A       Musculoskeletal and Injuries    Osteoarthritis M19.90       Neuro    Dementia (Multi) - Primary F03.90    Intellectual disability F79     Other Visit Diagnoses         Codes    Bipolar affective disorder, remission status unspecified (Multi)     F31.9    Weakness     R53.1    Hypertension, unspecified type     I10    Hyperlipidemia, unspecified hyperlipidemia type     E78.5        1. Dementia, unchanged.  2. Bipolar disorder, on medication.  3. Weakness, on PT/OT.  4. Fall risk, on fall precautions.  5. Hypertension, medically controlled.  6. Intellectual disability, on supportive care.  7. Reflux disease, on PPI.  8. BPH, on tamsulosin.  9. Osteoarthritis, on Tylenol.  10. Depression, on medication.  11. Hyperlipidemia, on statin.    Scribe Attestation  By signing my name below, IElizabeth Scribe attest that this documentation has been prepared under the direction and in the presence of Don Lozada MD.     All medical record entries made by the scribe were personally dictated by me I have reviewed the chart and agree the record accurately reflects my  personal performance of his history physical examination and management

## 2024-08-03 ENCOUNTER — NURSING HOME VISIT (OUTPATIENT)
Dept: POST ACUTE CARE | Facility: EXTERNAL LOCATION | Age: 89
End: 2024-08-03
Payer: COMMERCIAL

## 2024-08-03 DIAGNOSIS — I10 HYPERTENSION, UNSPECIFIED TYPE: ICD-10-CM

## 2024-08-03 DIAGNOSIS — I25.10 ASHD (ARTERIOSCLEROTIC HEART DISEASE): ICD-10-CM

## 2024-08-03 DIAGNOSIS — F32.A DEPRESSION, UNSPECIFIED DEPRESSION TYPE: ICD-10-CM

## 2024-08-03 DIAGNOSIS — K21.9 GASTROESOPHAGEAL REFLUX DISEASE WITHOUT ESOPHAGITIS: ICD-10-CM

## 2024-08-03 DIAGNOSIS — F03.90 DEMENTIA, UNSPECIFIED DEMENTIA SEVERITY, UNSPECIFIED DEMENTIA TYPE, UNSPECIFIED WHETHER BEHAVIORAL, PSYCHOTIC, OR MOOD DISTURBANCE OR ANXIETY (MULTI): Primary | ICD-10-CM

## 2024-08-03 DIAGNOSIS — N40.0 BENIGN PROSTATIC HYPERPLASIA WITHOUT URINARY OBSTRUCTION: ICD-10-CM

## 2024-08-03 DIAGNOSIS — R53.1 WEAKNESS: ICD-10-CM

## 2024-08-03 DIAGNOSIS — Z91.81 AT RISK FOR FALLS: ICD-10-CM

## 2024-08-03 DIAGNOSIS — F31.9 BIPOLAR AFFECTIVE DISORDER, REMISSION STATUS UNSPECIFIED (MULTI): ICD-10-CM

## 2024-08-03 PROCEDURE — 99308 SBSQ NF CARE LOW MDM 20: CPT | Performed by: INTERNAL MEDICINE

## 2024-08-03 NOTE — LETTER
Patient: Anthony Blair  : 1935    Encounter Date: 2024    PLACE OF SERVICE:  [facility].    This is a subsequent visit.    Subjective  Patient ID: Anthony Blair is a 89 y.o. male who presents for Follow-up.    Mr. Anthony Blair is an 89-year-old male with history of dementia with bipolar disorder.  He is unable to care for himself and requires supportive care.    Review of Systems   Constitutional:  Negative for chills and fever.   Cardiovascular:  Negative for chest pain.   All other systems reviewed and are negative.    Objective  /76   Pulse 72   Temp 36.4 °C (97.6 °F)   Resp 16     Physical Exam  Vitals reviewed.   Constitutional:       General: He is not in acute distress.     Comments: This is a well-developed, well-nourished male, sitting in a chair.   HENT:      Right Ear: Tympanic membrane, ear canal and external ear normal.      Left Ear: Tympanic membrane, ear canal and external ear normal.   Eyes:      General: No scleral icterus.     Pupils: Pupils are equal, round, and reactive to light.   Neck:      Vascular: No carotid bruit.   Cardiovascular:      Heart sounds: Normal heart sounds, S1 normal and S2 normal. No murmur heard.     No friction rub.   Pulmonary:      Effort: Pulmonary effort is normal.      Breath sounds: Normal breath sounds and air entry.   Abdominal:      Palpations: There is no hepatomegaly, splenomegaly or mass.   Musculoskeletal:         General: No swelling or deformity. Normal range of motion.      Cervical back: Neck supple.      Right lower leg: No edema.      Left lower leg: No edema.   Lymphadenopathy:      Cervical: No cervical adenopathy.      Upper Body:      Right upper body: No axillary adenopathy.      Left upper body: No axillary adenopathy.      Lower Body: No right inguinal adenopathy. No left inguinal adenopathy.   Neurological:      Mental Status: He is alert.      Cranial Nerves: Cranial nerves 2-12 are intact. No cranial nerve deficit.       Sensory: No sensory deficit.      Motor: Motor function is intact. No weakness.      Gait: Gait is intact.      Deep Tendon Reflexes: Reflexes normal.      Comments: The patient is oriented x2.   Psychiatric:         Mood and Affect: Mood normal. Mood is not anxious or depressed. Affect is not angry.         Behavior: Behavior is not agitated.         Thought Content: Thought content normal.         Judgment: Judgment normal.     LAB WORK:  Laboratory studies reviewed.    Assessment/Plan  Problem List Items Addressed This Visit             ICD-10-CM       Advance Directives and General Issues    At risk for falls Z91.81       Gastrointestinal and Abdominal    Gastroesophageal reflux disease without esophagitis K21.9       Genitourinary and Reproductive    Benign prostatic hyperplasia without urinary obstruction N40.0       Mental Health    Depression F32.A       Neuro    Dementia (Multi) - Primary F03.90     Other Visit Diagnoses         Codes    Bipolar affective disorder, remission status unspecified (Multi)     F31.9    Hypertension, unspecified type     I10    ASHD (arteriosclerotic heart disease)     I25.10    Weakness     R53.1        1. Dementia, unchanged.  2. Bipolar disorder, on medication.  3. Hypertension, med controlled.  4. Depression, on medication.  5. ASHD, on aspirin.  6. BPH, on tamsulosin.  7. GERD, on PPI.  8. Weakness, on PT/OT.  9. Fall risk, on fall precautions.    Scribe Attestation  By signing my name below, I, Xander Dawn attest that this documentation has been prepared under the direction and in the presence of Don Lozada MD.     All medical record entries made by the scribe were personally dictated by me I have reviewed the chart and agree the record accurately reflects my personal performance of his history physical examination and management      Electronically Signed By: Don Lozada MD   8/6/24 12:51 AM

## 2024-08-05 VITALS
DIASTOLIC BLOOD PRESSURE: 76 MMHG | HEART RATE: 72 BPM | TEMPERATURE: 97.6 F | RESPIRATION RATE: 16 BRPM | SYSTOLIC BLOOD PRESSURE: 120 MMHG

## 2024-08-05 ASSESSMENT — ENCOUNTER SYMPTOMS
FEVER: 0
CHILLS: 0

## 2024-08-05 NOTE — PROGRESS NOTES
PLACE OF SERVICE:  [facility].    This is a subsequent visit.    Subjective   Patient ID: Anthony Blair is a 89 y.o. male who presents for Follow-up.    Mr. Anthony Blair is an 89-year-old male with history of dementia with bipolar disorder.  He is unable to care for himself and requires supportive care.    Review of Systems   Constitutional:  Negative for chills and fever.   Cardiovascular:  Negative for chest pain.   All other systems reviewed and are negative.    Objective   /76   Pulse 72   Temp 36.4 °C (97.6 °F)   Resp 16     Physical Exam  Vitals reviewed.   Constitutional:       General: He is not in acute distress.     Comments: This is a well-developed, well-nourished male, sitting in a chair.   HENT:      Right Ear: Tympanic membrane, ear canal and external ear normal.      Left Ear: Tympanic membrane, ear canal and external ear normal.   Eyes:      General: No scleral icterus.     Pupils: Pupils are equal, round, and reactive to light.   Neck:      Vascular: No carotid bruit.   Cardiovascular:      Heart sounds: Normal heart sounds, S1 normal and S2 normal. No murmur heard.     No friction rub.   Pulmonary:      Effort: Pulmonary effort is normal.      Breath sounds: Normal breath sounds and air entry.   Abdominal:      Palpations: There is no hepatomegaly, splenomegaly or mass.   Musculoskeletal:         General: No swelling or deformity. Normal range of motion.      Cervical back: Neck supple.      Right lower leg: No edema.      Left lower leg: No edema.   Lymphadenopathy:      Cervical: No cervical adenopathy.      Upper Body:      Right upper body: No axillary adenopathy.      Left upper body: No axillary adenopathy.      Lower Body: No right inguinal adenopathy. No left inguinal adenopathy.   Neurological:      Mental Status: He is alert.      Cranial Nerves: Cranial nerves 2-12 are intact. No cranial nerve deficit.      Sensory: No sensory deficit.      Motor: Motor function is  intact. No weakness.      Gait: Gait is intact.      Deep Tendon Reflexes: Reflexes normal.      Comments: The patient is oriented x2.   Psychiatric:         Mood and Affect: Mood normal. Mood is not anxious or depressed. Affect is not angry.         Behavior: Behavior is not agitated.         Thought Content: Thought content normal.         Judgment: Judgment normal.     LAB WORK:  Laboratory studies reviewed.    Assessment/Plan   Problem List Items Addressed This Visit             ICD-10-CM       Advance Directives and General Issues    At risk for falls Z91.81       Gastrointestinal and Abdominal    Gastroesophageal reflux disease without esophagitis K21.9       Genitourinary and Reproductive    Benign prostatic hyperplasia without urinary obstruction N40.0       Mental Health    Depression F32.A       Neuro    Dementia (Multi) - Primary F03.90     Other Visit Diagnoses         Codes    Bipolar affective disorder, remission status unspecified (Multi)     F31.9    Hypertension, unspecified type     I10    ASHD (arteriosclerotic heart disease)     I25.10    Weakness     R53.1        1. Dementia, unchanged.  2. Bipolar disorder, on medication.  3. Hypertension, med controlled.  4. Depression, on medication.  5. ASHD, on aspirin.  6. BPH, on tamsulosin.  7. GERD, on PPI.  8. Weakness, on PT/OT.  9. Fall risk, on fall precautions.    Scribe Attestation  By signing my name below, I, Xander Dawn attest that this documentation has been prepared under the direction and in the presence of Don Lozada MD.     All medical record entries made by the scribe were personally dictated by me I have reviewed the chart and agree the record accurately reflects my personal performance of his history physical examination and management

## 2024-09-01 ENCOUNTER — NURSING HOME VISIT (OUTPATIENT)
Dept: POST ACUTE CARE | Facility: EXTERNAL LOCATION | Age: 89
End: 2024-09-01
Payer: COMMERCIAL

## 2024-09-01 DIAGNOSIS — R53.1 WEAKNESS: ICD-10-CM

## 2024-09-01 DIAGNOSIS — K21.9 GASTROESOPHAGEAL REFLUX DISEASE WITHOUT ESOPHAGITIS: ICD-10-CM

## 2024-09-01 DIAGNOSIS — Z91.81 AT RISK FOR FALLS: ICD-10-CM

## 2024-09-01 DIAGNOSIS — N40.0 BENIGN PROSTATIC HYPERPLASIA WITHOUT URINARY OBSTRUCTION: ICD-10-CM

## 2024-09-01 DIAGNOSIS — I10 HYPERTENSION, UNSPECIFIED TYPE: ICD-10-CM

## 2024-09-01 DIAGNOSIS — F31.9 BIPOLAR AFFECTIVE DISORDER, REMISSION STATUS UNSPECIFIED (MULTI): ICD-10-CM

## 2024-09-01 DIAGNOSIS — R41.89 COGNITIVE DECLINE: ICD-10-CM

## 2024-09-01 DIAGNOSIS — I25.10 ASHD (ARTERIOSCLEROTIC HEART DISEASE): ICD-10-CM

## 2024-09-01 DIAGNOSIS — E78.5 HYPERLIPIDEMIA, UNSPECIFIED HYPERLIPIDEMIA TYPE: ICD-10-CM

## 2024-09-01 DIAGNOSIS — F32.A DEPRESSION, UNSPECIFIED DEPRESSION TYPE: ICD-10-CM

## 2024-09-01 DIAGNOSIS — F03.90 DEMENTIA, UNSPECIFIED DEMENTIA SEVERITY, UNSPECIFIED DEMENTIA TYPE, UNSPECIFIED WHETHER BEHAVIORAL, PSYCHOTIC, OR MOOD DISTURBANCE OR ANXIETY (MULTI): Primary | ICD-10-CM

## 2024-09-01 PROCEDURE — 99308 SBSQ NF CARE LOW MDM 20: CPT | Performed by: INTERNAL MEDICINE

## 2024-09-01 NOTE — LETTER
Patient: Anthony Blair  : 1935    Encounter Date: 2024    PLACE OF SERVICE:  [facility]    This is a subsequent visit.    Subjective  Patient ID: Anthony Blair is a 89 y.o. male who presents for Follow-up.    Mr. Anthony Blair is an 89-year-old male with history of dementia with depression.  He is unable to care for himself and requires supportive care.    Review of Systems   Constitutional:  Negative for chills and fever.   Cardiovascular:  Negative for chest pain.   All other systems reviewed and are negative.    Objective  /76   Pulse 78   Temp 36.4 °C (97.6 °F)   Resp 16     Physical Exam  Vitals reviewed.   Constitutional:       General: He is not in acute distress.     Comments: This is a well-developed, well-nourished male, sitting in a chair   HENT:      Right Ear: Tympanic membrane, ear canal and external ear normal.      Left Ear: Tympanic membrane, ear canal and external ear normal.   Eyes:      General: No scleral icterus.     Pupils: Pupils are equal, round, and reactive to light.   Neck:      Vascular: No carotid bruit.   Cardiovascular:      Heart sounds: Normal heart sounds, S1 normal and S2 normal. No murmur heard.     No friction rub.   Pulmonary:      Effort: Pulmonary effort is normal.      Breath sounds: Normal breath sounds and air entry.   Abdominal:      Palpations: There is no hepatomegaly, splenomegaly or mass.   Musculoskeletal:         General: No swelling or deformity. Normal range of motion.      Cervical back: Neck supple.      Right lower leg: No edema.      Left lower leg: No edema.   Lymphadenopathy:      Cervical: No cervical adenopathy.      Upper Body:      Right upper body: No axillary adenopathy.      Left upper body: No axillary adenopathy.      Lower Body: No right inguinal adenopathy. No left inguinal adenopathy.   Neurological:      Cranial Nerves: Cranial nerves 2-12 are intact. No cranial nerve deficit.      Sensory: No sensory deficit.       Motor: Motor function is intact. No weakness.      Gait: Gait is intact.      Deep Tendon Reflexes: Reflexes normal.      Comments: The patient is alert and oriented x2.   Psychiatric:         Mood and Affect: Mood normal. Mood is not anxious or depressed. Affect is not angry.         Behavior: Behavior is not agitated.         Thought Content: Thought content normal.         Judgment: Judgment normal.     LAB WORK:  Laboratory studies were reviewed.    Assessment/Plan  Problem List Items Addressed This Visit             ICD-10-CM       Advance Directives and General Issues    At risk for falls Z91.81       Gastrointestinal and Abdominal    Gastroesophageal reflux disease without esophagitis K21.9       Genitourinary and Reproductive    Benign prostatic hyperplasia without urinary obstruction N40.0       Mental Health    Depression F32.A       Neuro    Dementia (Multi) - Primary F03.90     Other Visit Diagnoses         Codes    Bipolar affective disorder, remission status unspecified (Multi)     F31.9    Hyperlipidemia, unspecified hyperlipidemia type     E78.5    ASHD (arteriosclerotic heart disease)     I25.10    Cognitive decline     R41.89    Weakness     R53.1    Hypertension, unspecified type     I10        1. Dementia, unchanged.  2. Depression, on medication.  3. Bipolar disorder, on medication.  4. Hyperlipidemia, on statin.  5. ASHD, on aspirin.  6. Cognitive decline, on supportive care.  7. Weakness, on PT/OT.  8. Fall risk, on fall precautions.  9. BPH, on tamulosin.  10. GERD, on PPI.  11. Hypertension, medically controlled.    Scribe Attestation  By signing my name below, IElizabeth Scribe attest that this documentation has been prepared under the direction and in the presence of Don Lozada MD.     All medical record entries made by the scribe were personally dictated by me I have reviewed the chart and agree the record accurately reflects my personal performance of his history physical  examination and management      Electronically Signed By: Don Lozada MD   9/12/24 11:59 PM

## 2024-09-03 VITALS
TEMPERATURE: 97.6 F | DIASTOLIC BLOOD PRESSURE: 76 MMHG | SYSTOLIC BLOOD PRESSURE: 120 MMHG | RESPIRATION RATE: 16 BRPM | HEART RATE: 78 BPM

## 2024-09-03 ASSESSMENT — ENCOUNTER SYMPTOMS
CHILLS: 0
FEVER: 0

## 2024-09-04 NOTE — PROGRESS NOTES
PLACE OF SERVICE:  [facility]    This is a subsequent visit.    Subjective   Patient ID: Anthony Blair is a 89 y.o. male who presents for Follow-up.    Mr. Anthony Blair is an 89-year-old male with history of dementia with depression.  He is unable to care for himself and requires supportive care.    Review of Systems   Constitutional:  Negative for chills and fever.   Cardiovascular:  Negative for chest pain.   All other systems reviewed and are negative.    Objective   /76   Pulse 78   Temp 36.4 °C (97.6 °F)   Resp 16     Physical Exam  Vitals reviewed.   Constitutional:       General: He is not in acute distress.     Comments: This is a well-developed, well-nourished male, sitting in a chair   HENT:      Right Ear: Tympanic membrane, ear canal and external ear normal.      Left Ear: Tympanic membrane, ear canal and external ear normal.   Eyes:      General: No scleral icterus.     Pupils: Pupils are equal, round, and reactive to light.   Neck:      Vascular: No carotid bruit.   Cardiovascular:      Heart sounds: Normal heart sounds, S1 normal and S2 normal. No murmur heard.     No friction rub.   Pulmonary:      Effort: Pulmonary effort is normal.      Breath sounds: Normal breath sounds and air entry.   Abdominal:      Palpations: There is no hepatomegaly, splenomegaly or mass.   Musculoskeletal:         General: No swelling or deformity. Normal range of motion.      Cervical back: Neck supple.      Right lower leg: No edema.      Left lower leg: No edema.   Lymphadenopathy:      Cervical: No cervical adenopathy.      Upper Body:      Right upper body: No axillary adenopathy.      Left upper body: No axillary adenopathy.      Lower Body: No right inguinal adenopathy. No left inguinal adenopathy.   Neurological:      Cranial Nerves: Cranial nerves 2-12 are intact. No cranial nerve deficit.      Sensory: No sensory deficit.      Motor: Motor function is intact. No weakness.      Gait: Gait is  intact.      Deep Tendon Reflexes: Reflexes normal.      Comments: The patient is alert and oriented x2.   Psychiatric:         Mood and Affect: Mood normal. Mood is not anxious or depressed. Affect is not angry.         Behavior: Behavior is not agitated.         Thought Content: Thought content normal.         Judgment: Judgment normal.     LAB WORK:  Laboratory studies were reviewed.    Assessment/Plan   Problem List Items Addressed This Visit             ICD-10-CM       Advance Directives and General Issues    At risk for falls Z91.81       Gastrointestinal and Abdominal    Gastroesophageal reflux disease without esophagitis K21.9       Genitourinary and Reproductive    Benign prostatic hyperplasia without urinary obstruction N40.0       Mental Health    Depression F32.A       Neuro    Dementia (Multi) - Primary F03.90     Other Visit Diagnoses         Codes    Bipolar affective disorder, remission status unspecified (Multi)     F31.9    Hyperlipidemia, unspecified hyperlipidemia type     E78.5    ASHD (arteriosclerotic heart disease)     I25.10    Cognitive decline     R41.89    Weakness     R53.1    Hypertension, unspecified type     I10        1. Dementia, unchanged.  2. Depression, on medication.  3. Bipolar disorder, on medication.  4. Hyperlipidemia, on statin.  5. ASHD, on aspirin.  6. Cognitive decline, on supportive care.  7. Weakness, on PT/OT.  8. Fall risk, on fall precautions.  9. BPH, on tamulosin.  10. GERD, on PPI.  11. Hypertension, medically controlled.    Scribe Attestation  By signing my name below, IElizabeth Scribe attest that this documentation has been prepared under the direction and in the presence of Don Lozada MD.     All medical record entries made by the scribe were personally dictated by me I have reviewed the chart and agree the record accurately reflects my personal performance of his history physical examination and management

## 2024-09-08 ENCOUNTER — NURSING HOME VISIT (OUTPATIENT)
Dept: POST ACUTE CARE | Facility: EXTERNAL LOCATION | Age: 89
End: 2024-09-08
Payer: COMMERCIAL

## 2024-09-08 DIAGNOSIS — E78.5 HYPERLIPIDEMIA, UNSPECIFIED HYPERLIPIDEMIA TYPE: ICD-10-CM

## 2024-09-08 DIAGNOSIS — F32.A DEPRESSION, UNSPECIFIED DEPRESSION TYPE: ICD-10-CM

## 2024-09-08 DIAGNOSIS — F79 INTELLECTUAL DISABILITY: ICD-10-CM

## 2024-09-08 DIAGNOSIS — N18.30 STAGE 3 CHRONIC KIDNEY DISEASE, UNSPECIFIED WHETHER STAGE 3A OR 3B CKD (MULTI): ICD-10-CM

## 2024-09-08 DIAGNOSIS — K21.9 GASTROESOPHAGEAL REFLUX DISEASE WITHOUT ESOPHAGITIS: ICD-10-CM

## 2024-09-08 DIAGNOSIS — G47.00 INSOMNIA, UNSPECIFIED TYPE: ICD-10-CM

## 2024-09-08 DIAGNOSIS — N40.0 BENIGN PROSTATIC HYPERPLASIA WITHOUT URINARY OBSTRUCTION: ICD-10-CM

## 2024-09-08 DIAGNOSIS — F03.90 DEMENTIA, UNSPECIFIED DEMENTIA SEVERITY, UNSPECIFIED DEMENTIA TYPE, UNSPECIFIED WHETHER BEHAVIORAL, PSYCHOTIC, OR MOOD DISTURBANCE OR ANXIETY (MULTI): Primary | ICD-10-CM

## 2024-09-08 DIAGNOSIS — I10 HYPERTENSION, UNSPECIFIED TYPE: ICD-10-CM

## 2024-09-08 DIAGNOSIS — I25.10 ASHD (ARTERIOSCLEROTIC HEART DISEASE): ICD-10-CM

## 2024-09-08 DIAGNOSIS — E46 PROTEIN-CALORIE MALNUTRITION, UNSPECIFIED SEVERITY (MULTI): ICD-10-CM

## 2024-09-08 DIAGNOSIS — M19.90 OSTEOARTHRITIS, UNSPECIFIED OSTEOARTHRITIS TYPE, UNSPECIFIED SITE: ICD-10-CM

## 2024-09-08 DIAGNOSIS — F03.918 UNSPECIFIED DEMENTIA, UNSPECIFIED SEVERITY, WITH OTHER BEHAVIORAL DISTURBANCE (MULTI): ICD-10-CM

## 2024-09-08 DIAGNOSIS — F31.9 BIPOLAR AFFECTIVE DISORDER, REMISSION STATUS UNSPECIFIED (MULTI): ICD-10-CM

## 2024-09-08 PROCEDURE — 99309 SBSQ NF CARE MODERATE MDM 30: CPT | Performed by: INTERNAL MEDICINE

## 2024-09-08 NOTE — LETTER
Patient: Anthony Blair  : 1935    Encounter Date: 2024    PLACE OF SERVICE:  [facility]    This is a subsequent visit.    Subjective  Patient ID: Anthony Blair is a 89 y.o. male who presents for Follow-up.    Mr. Anthony Blair is an 89-year-old male with history of depression with Alzheimer's dementia.  He is unable to care for himself and manage his affairs.  He requires supportive care.    Review of Systems   Constitutional:  Negative for chills and fever.   Cardiovascular:  Negative for chest pain.   All other systems reviewed and are negative.    Objective  /76   Pulse 76   Temp 36.6 °C (97.8 °F)   Resp 16     Physical Exam  Vitals reviewed.   Constitutional:       General: He is not in acute distress.     Comments: This is a well-developed, well-nourished male, sitting in a chair   HENT:      Right Ear: Tympanic membrane, ear canal and external ear normal.      Left Ear: Tympanic membrane, ear canal and external ear normal.   Eyes:      General: No scleral icterus.     Pupils: Pupils are equal, round, and reactive to light.   Neck:      Vascular: No carotid bruit.   Cardiovascular:      Heart sounds: Normal heart sounds, S1 normal and S2 normal. No murmur heard.     No friction rub.   Pulmonary:      Effort: Pulmonary effort is normal.      Breath sounds: Normal breath sounds and air entry.   Abdominal:      Palpations: There is no hepatomegaly, splenomegaly or mass.   Musculoskeletal:         General: No swelling or deformity. Normal range of motion.      Cervical back: Neck supple.      Right lower leg: No edema.      Left lower leg: No edema.   Lymphadenopathy:      Cervical: No cervical adenopathy.      Upper Body:      Right upper body: No axillary adenopathy.      Left upper body: No axillary adenopathy.      Lower Body: No right inguinal adenopathy. No left inguinal adenopathy.   Neurological:      Mental Status: He is alert.      Cranial Nerves: Cranial nerves 2-12 are  intact. No cranial nerve deficit.      Sensory: No sensory deficit.      Motor: Motor function is intact. No weakness.      Gait: Gait is intact.      Deep Tendon Reflexes: Reflexes normal.      Comments: The patient is oriented x2.   Psychiatric:         Mood and Affect: Mood normal. Mood is not anxious or depressed. Affect is not angry.         Behavior: Behavior is not agitated.         Thought Content: Thought content normal.         Judgment: Judgment normal.     LAB WORK:  Laboratory studies were reviewed.    Assessment/Plan  Problem List Items Addressed This Visit             ICD-10-CM       Gastrointestinal and Abdominal    Gastroesophageal reflux disease without esophagitis K21.9       Genitourinary and Reproductive    Benign prostatic hyperplasia without urinary obstruction N40.0       Mental Health    Depression F32.A       Musculoskeletal and Injuries    Osteoarthritis M19.90       Neuro    Dementia (Multi) - Primary F03.90    Intellectual disability F79     Other Visit Diagnoses         Codes    Hypertension, unspecified type     I10    Bipolar affective disorder, remission status unspecified (Multi)     F31.9    Hyperlipidemia, unspecified hyperlipidemia type     E78.5    ASHD (arteriosclerotic heart disease)     I25.10    Insomnia, unspecified type     G47.00        1. Dementia, unchanged.  2. Hypertension, medically controlled.  3. Bipolar disorder, on medication.  4. Depression, on medication.  5. Intellectual disability, on supportive care.  6. Hyperlipidemia, on statin.  7. ASHD, on aspirin.  8. BPH, on tamulosin.  9. Osteoarthritis, on Tylenol.  10. GERD, on PPI.  11. Insomnia, on melatonin.    Scribe Attestation  By signing my name below, IElizabeth Scribe attest that this documentation has been prepared under the direction and in the presence of Don Lozada MD.     All medical record entries made by the scribe were personally dictated by me I have reviewed the chart and agree the record  accurately reflects my personal performance of his history physical examination and management      Electronically Signed By: Don Lozada MD   9/14/24 11:53 PM

## 2024-09-12 ENCOUNTER — HOSPITAL ENCOUNTER (EMERGENCY)
Facility: HOSPITAL | Age: 89
Discharge: HOME | End: 2024-09-12
Attending: STUDENT IN AN ORGANIZED HEALTH CARE EDUCATION/TRAINING PROGRAM
Payer: COMMERCIAL

## 2024-09-12 ENCOUNTER — APPOINTMENT (OUTPATIENT)
Dept: RADIOLOGY | Facility: HOSPITAL | Age: 89
End: 2024-09-12
Payer: COMMERCIAL

## 2024-09-12 ENCOUNTER — APPOINTMENT (OUTPATIENT)
Dept: CARDIOLOGY | Facility: HOSPITAL | Age: 89
End: 2024-09-12
Payer: COMMERCIAL

## 2024-09-12 VITALS
BODY MASS INDEX: 18.96 KG/M2 | RESPIRATION RATE: 16 BRPM | TEMPERATURE: 98.7 F | HEART RATE: 82 BPM | OXYGEN SATURATION: 96 % | SYSTOLIC BLOOD PRESSURE: 134 MMHG | DIASTOLIC BLOOD PRESSURE: 82 MMHG | WEIGHT: 140 LBS | HEIGHT: 72 IN

## 2024-09-12 DIAGNOSIS — N39.0 URINARY TRACT INFECTION ASSOCIATED WITH INDWELLING URETHRAL CATHETER, INITIAL ENCOUNTER (CMS-HCC): ICD-10-CM

## 2024-09-12 DIAGNOSIS — T83.511A URINARY TRACT INFECTION ASSOCIATED WITH INDWELLING URETHRAL CATHETER, INITIAL ENCOUNTER (CMS-HCC): ICD-10-CM

## 2024-09-12 DIAGNOSIS — R10.84 ABDOMINAL PAIN, GENERALIZED: Primary | ICD-10-CM

## 2024-09-12 LAB
ALBUMIN SERPL-MCNC: 4.2 G/DL (ref 3.5–5)
ALP BLD-CCNC: 80 U/L (ref 35–125)
ALT SERPL-CCNC: 10 U/L (ref 5–40)
ANION GAP SERPL CALC-SCNC: 12 MMOL/L
APPEARANCE UR: ABNORMAL
AST SERPL-CCNC: 21 U/L (ref 5–40)
BACTERIA #/AREA URNS AUTO: ABNORMAL /HPF
BASOPHILS # BLD AUTO: 0.02 X10*3/UL (ref 0–0.1)
BASOPHILS NFR BLD AUTO: 0.4 %
BILIRUB SERPL-MCNC: 0.3 MG/DL (ref 0.1–1.2)
BILIRUB UR STRIP.AUTO-MCNC: NEGATIVE MG/DL
BUN SERPL-MCNC: 33 MG/DL (ref 8–25)
CALCIUM SERPL-MCNC: 9.3 MG/DL (ref 8.5–10.4)
CHLORIDE SERPL-SCNC: 104 MMOL/L (ref 97–107)
CO2 SERPL-SCNC: 24 MMOL/L (ref 24–31)
COLOR UR: YELLOW
CREAT SERPL-MCNC: 1.2 MG/DL (ref 0.4–1.6)
EGFRCR SERPLBLD CKD-EPI 2021: 59 ML/MIN/1.73M*2
EOSINOPHIL # BLD AUTO: 0.11 X10*3/UL (ref 0–0.4)
EOSINOPHIL NFR BLD AUTO: 2.4 %
ERYTHROCYTE [DISTWIDTH] IN BLOOD BY AUTOMATED COUNT: 13.8 % (ref 11.5–14.5)
GLUCOSE SERPL-MCNC: 101 MG/DL (ref 65–99)
GLUCOSE UR STRIP.AUTO-MCNC: NORMAL MG/DL
HCT VFR BLD AUTO: 38.4 % (ref 41–52)
HGB BLD-MCNC: 12.5 G/DL (ref 13.5–17.5)
IMM GRANULOCYTES # BLD AUTO: 0.01 X10*3/UL (ref 0–0.5)
IMM GRANULOCYTES NFR BLD AUTO: 0.2 % (ref 0–0.9)
KETONES UR STRIP.AUTO-MCNC: NEGATIVE MG/DL
LACTATE BLDV-SCNC: 0.9 MMOL/L (ref 0.4–2)
LEUKOCYTE ESTERASE UR QL STRIP.AUTO: ABNORMAL
LIPASE SERPL-CCNC: 40 U/L (ref 16–63)
LYMPHOCYTES # BLD AUTO: 1.21 X10*3/UL (ref 0.8–3)
LYMPHOCYTES NFR BLD AUTO: 26.8 %
MCH RBC QN AUTO: 30.2 PG (ref 26–34)
MCHC RBC AUTO-ENTMCNC: 32.6 G/DL (ref 32–36)
MCV RBC AUTO: 93 FL (ref 80–100)
MONOCYTES # BLD AUTO: 0.43 X10*3/UL (ref 0.05–0.8)
MONOCYTES NFR BLD AUTO: 9.5 %
NEUTROPHILS # BLD AUTO: 2.74 X10*3/UL (ref 1.6–5.5)
NEUTROPHILS NFR BLD AUTO: 60.7 %
NITRITE UR QL STRIP.AUTO: ABNORMAL
NRBC BLD-RTO: 0 /100 WBCS (ref 0–0)
PH UR STRIP.AUTO: 6 [PH]
PLATELET # BLD AUTO: 165 X10*3/UL (ref 150–450)
POTASSIUM SERPL-SCNC: 3.9 MMOL/L (ref 3.4–5.1)
PROT SERPL-MCNC: 6.6 G/DL (ref 5.9–7.9)
PROT UR STRIP.AUTO-MCNC: ABNORMAL MG/DL
RBC # BLD AUTO: 4.14 X10*6/UL (ref 4.5–5.9)
RBC # UR STRIP.AUTO: NEGATIVE /UL
RBC #/AREA URNS AUTO: >20 /HPF
SODIUM SERPL-SCNC: 140 MMOL/L (ref 133–145)
SP GR UR STRIP.AUTO: 1.02
SQUAMOUS #/AREA URNS AUTO: ABNORMAL /HPF
TROPONIN T SERPL-MCNC: 16 NG/L
UROBILINOGEN UR STRIP.AUTO-MCNC: NORMAL MG/DL
WBC # BLD AUTO: 4.5 X10*3/UL (ref 4.4–11.3)
WBC #/AREA URNS AUTO: >50 /HPF
WBC CLUMPS #/AREA URNS AUTO: ABNORMAL /HPF

## 2024-09-12 PROCEDURE — 96375 TX/PRO/DX INJ NEW DRUG ADDON: CPT

## 2024-09-12 PROCEDURE — 96361 HYDRATE IV INFUSION ADD-ON: CPT

## 2024-09-12 PROCEDURE — 83690 ASSAY OF LIPASE: CPT | Performed by: STUDENT IN AN ORGANIZED HEALTH CARE EDUCATION/TRAINING PROGRAM

## 2024-09-12 PROCEDURE — 99285 EMERGENCY DEPT VISIT HI MDM: CPT

## 2024-09-12 PROCEDURE — 85025 COMPLETE CBC W/AUTO DIFF WBC: CPT | Performed by: STUDENT IN AN ORGANIZED HEALTH CARE EDUCATION/TRAINING PROGRAM

## 2024-09-12 PROCEDURE — 36415 COLL VENOUS BLD VENIPUNCTURE: CPT | Performed by: STUDENT IN AN ORGANIZED HEALTH CARE EDUCATION/TRAINING PROGRAM

## 2024-09-12 PROCEDURE — 2550000001 HC RX 255 CONTRASTS: Performed by: STUDENT IN AN ORGANIZED HEALTH CARE EDUCATION/TRAINING PROGRAM

## 2024-09-12 PROCEDURE — 96365 THER/PROPH/DIAG IV INF INIT: CPT

## 2024-09-12 PROCEDURE — 83605 ASSAY OF LACTIC ACID: CPT | Performed by: STUDENT IN AN ORGANIZED HEALTH CARE EDUCATION/TRAINING PROGRAM

## 2024-09-12 PROCEDURE — 80053 COMPREHEN METABOLIC PANEL: CPT | Performed by: STUDENT IN AN ORGANIZED HEALTH CARE EDUCATION/TRAINING PROGRAM

## 2024-09-12 PROCEDURE — 74177 CT ABD & PELVIS W/CONTRAST: CPT

## 2024-09-12 PROCEDURE — 81003 URINALYSIS AUTO W/O SCOPE: CPT | Performed by: STUDENT IN AN ORGANIZED HEALTH CARE EDUCATION/TRAINING PROGRAM

## 2024-09-12 PROCEDURE — 2500000004 HC RX 250 GENERAL PHARMACY W/ HCPCS (ALT 636 FOR OP/ED): Performed by: STUDENT IN AN ORGANIZED HEALTH CARE EDUCATION/TRAINING PROGRAM

## 2024-09-12 PROCEDURE — 87086 URINE CULTURE/COLONY COUNT: CPT | Mod: TRILAB,WESLAB | Performed by: STUDENT IN AN ORGANIZED HEALTH CARE EDUCATION/TRAINING PROGRAM

## 2024-09-12 PROCEDURE — 93005 ELECTROCARDIOGRAM TRACING: CPT

## 2024-09-12 PROCEDURE — 84484 ASSAY OF TROPONIN QUANT: CPT | Performed by: STUDENT IN AN ORGANIZED HEALTH CARE EDUCATION/TRAINING PROGRAM

## 2024-09-12 PROCEDURE — 74177 CT ABD & PELVIS W/CONTRAST: CPT | Performed by: RADIOLOGY

## 2024-09-12 RX ORDER — FAMOTIDINE 10 MG/ML
20 INJECTION INTRAVENOUS ONCE
Status: COMPLETED | OUTPATIENT
Start: 2024-09-12 | End: 2024-09-12

## 2024-09-12 RX ORDER — CEFDINIR 300 MG/1
300 CAPSULE ORAL 2 TIMES DAILY
Qty: 14 CAPSULE | Refills: 0 | Status: SHIPPED | OUTPATIENT
Start: 2024-09-12 | End: 2024-09-19

## 2024-09-12 RX ORDER — CEFTRIAXONE 1 G/50ML
1 INJECTION, SOLUTION INTRAVENOUS ONCE
Status: COMPLETED | OUTPATIENT
Start: 2024-09-12 | End: 2024-09-12

## 2024-09-12 RX ADMIN — SODIUM CHLORIDE 500 ML: 900 INJECTION, SOLUTION INTRAVENOUS at 01:01

## 2024-09-12 RX ADMIN — IOHEXOL 75 ML: 350 INJECTION, SOLUTION INTRAVENOUS at 01:51

## 2024-09-12 RX ADMIN — CEFTRIAXONE SODIUM 1 G: 1 INJECTION, SOLUTION INTRAVENOUS at 03:41

## 2024-09-12 RX ADMIN — FAMOTIDINE 20 MG: 10 INJECTION, SOLUTION INTRAVENOUS at 01:02

## 2024-09-12 ASSESSMENT — PAIN - FUNCTIONAL ASSESSMENT
PAIN_FUNCTIONAL_ASSESSMENT: 0-10
PAIN_FUNCTIONAL_ASSESSMENT: 0-10

## 2024-09-12 ASSESSMENT — COLUMBIA-SUICIDE SEVERITY RATING SCALE - C-SSRS
6. HAVE YOU EVER DONE ANYTHING, STARTED TO DO ANYTHING, OR PREPARED TO DO ANYTHING TO END YOUR LIFE?: NO
1. IN THE PAST MONTH, HAVE YOU WISHED YOU WERE DEAD OR WISHED YOU COULD GO TO SLEEP AND NOT WAKE UP?: NO
2. HAVE YOU ACTUALLY HAD ANY THOUGHTS OF KILLING YOURSELF?: NO

## 2024-09-12 ASSESSMENT — PAIN SCALES - GENERAL
PAINLEVEL_OUTOF10: 6
PAINLEVEL_OUTOF10: 0 - NO PAIN

## 2024-09-12 ASSESSMENT — PAIN DESCRIPTION - LOCATION: LOCATION: ABDOMEN

## 2024-09-12 NOTE — DISCHARGE INSTRUCTIONS
It is important that he takes antibiotics through to completion.  If symptoms worsen or change he can return at any time for further evaluation and treatment.  Patient to follow-up with primary care physician within the next 1 to 2 days for reevaluation.

## 2024-09-12 NOTE — ED PROVIDER NOTES
HPI   Chief Complaint   Patient presents with    Abdominal Pain     Presents to ED from [facility] for abdominal pain.         Patient is an 89-year-old male that presents emergency department for evaluation of abdominal pain.  Patient states he has been having pain in his epigastric region that radiates down to the lower abdomen and right lower quadrant.  Is been going on starting today.  He states he has had this happen before however is unsure what causes it.  He admits to 1 episode of diarrhea earlier today.  Patient does have a history of dementia and is a poor historian however denies any other symptoms at this time.      History provided by:  Patient and EMS personnel          Patient History   Past Medical History:   Diagnosis Date    Alzheimer's dementia (Multi)     Anorexia     Anxiety     ASHD (arteriosclerotic heart disease)     At risk for falls     Bipolar disorder (Multi)     BPH (benign prostatic hyperplasia)     Cognitive decline     COVID-19     Dementia (Multi)     Depression     Frequent falls     GERD (gastroesophageal reflux disease)     Hyperlipidemia     Hypertension     Insomnia     Intellectual disability     Neuropathy     Osteoarthritis     Pacemaker     Schizoaffective disorder (Multi)     Weakness      No past surgical history on file.  No family history on file.  Social History     Tobacco Use    Smoking status: Never    Smokeless tobacco: Not on file   Substance Use Topics    Alcohol use: Never    Drug use: Not on file       Physical Exam   ED Triage Vitals [09/12/24 0044]   Temperature Heart Rate Respirations BP   37.1 °C (98.7 °F) 65 16 124/70      Pulse Ox Temp Source Heart Rate Source Patient Position   (!) 93 % Temporal Monitor --      BP Location FiO2 (%)     -- --       Physical Exam  Vitals and nursing note reviewed.   Constitutional:       General: He is not in acute distress.     Appearance: He is well-developed. He is not ill-appearing.   HENT:      Head:  Normocephalic and atraumatic.      Mouth/Throat:      Mouth: Mucous membranes are moist.   Eyes:      Extraocular Movements: Extraocular movements intact.      Pupils: Pupils are equal, round, and reactive to light.   Cardiovascular:      Rate and Rhythm: Normal rate and regular rhythm.   Pulmonary:      Effort: Pulmonary effort is normal. No respiratory distress.      Breath sounds: Normal breath sounds. No stridor. No wheezing or rhonchi.   Abdominal:      Palpations: Abdomen is soft.      Tenderness: There is generalized abdominal tenderness. There is no guarding or rebound.   Neurological:      Mental Status: He is alert.       Recent Results (from the past 24 hour(s))   CBC and Auto Differential    Collection Time: 09/12/24 12:53 AM   Result Value Ref Range    WBC 4.5 4.4 - 11.3 x10*3/uL    nRBC 0.0 0.0 - 0.0 /100 WBCs    RBC 4.14 (L) 4.50 - 5.90 x10*6/uL    Hemoglobin 12.5 (L) 13.5 - 17.5 g/dL    Hematocrit 38.4 (L) 41.0 - 52.0 %    MCV 93 80 - 100 fL    MCH 30.2 26.0 - 34.0 pg    MCHC 32.6 32.0 - 36.0 g/dL    RDW 13.8 11.5 - 14.5 %    Platelets 165 150 - 450 x10*3/uL    Neutrophils % 60.7 40.0 - 80.0 %    Immature Granulocytes %, Automated 0.2 0.0 - 0.9 %    Lymphocytes % 26.8 13.0 - 44.0 %    Monocytes % 9.5 2.0 - 10.0 %    Eosinophils % 2.4 0.0 - 6.0 %    Basophils % 0.4 0.0 - 2.0 %    Neutrophils Absolute 2.74 1.60 - 5.50 x10*3/uL    Immature Granulocytes Absolute, Automated 0.01 0.00 - 0.50 x10*3/uL    Lymphocytes Absolute 1.21 0.80 - 3.00 x10*3/uL    Monocytes Absolute 0.43 0.05 - 0.80 x10*3/uL    Eosinophils Absolute 0.11 0.00 - 0.40 x10*3/uL    Basophils Absolute 0.02 0.00 - 0.10 x10*3/uL   Comprehensive Metabolic Panel    Collection Time: 09/12/24 12:53 AM   Result Value Ref Range    Glucose 101 (H) 65 - 99 mg/dL    Sodium 140 133 - 145 mmol/L    Potassium 3.9 3.4 - 5.1 mmol/L    Chloride 104 97 - 107 mmol/L    Bicarbonate 24 24 - 31 mmol/L    Urea Nitrogen 33 (H) 8 - 25 mg/dL    Creatinine 1.20 0.40  - 1.60 mg/dL    eGFR 59 (L) >60 mL/min/1.73m*2    Calcium 9.3 8.5 - 10.4 mg/dL    Albumin 4.2 3.5 - 5.0 g/dL    Alkaline Phosphatase 80 35 - 125 U/L    Total Protein 6.6 5.9 - 7.9 g/dL    AST 21 5 - 40 U/L    Bilirubin, Total 0.3 0.1 - 1.2 mg/dL    ALT 10 5 - 40 U/L    Anion Gap 12 <=19 mmol/L   Lipase    Collection Time: 09/12/24 12:53 AM   Result Value Ref Range    Lipase 40 16 - 63 U/L   Blood Gas Lactic Acid, Venous    Collection Time: 09/12/24 12:53 AM   Result Value Ref Range    POCT Lactate, Venous 0.9 0.4 - 2.0 mmol/L   Urinalysis with Reflex Culture and Microscopic    Collection Time: 09/12/24  1:46 AM   Result Value Ref Range    Color, Urine Yellow Light-Yellow, Yellow, Dark-Yellow    Appearance, Urine Turbid (N) Clear    Specific Gravity, Urine 1.023 1.005 - 1.035    pH, Urine 6.0 5.0, 5.5, 6.0, 6.5, 7.0, 7.5, 8.0    Protein, Urine 20 (TRACE) NEGATIVE, 10 (TRACE), 20 (TRACE) mg/dL    Glucose, Urine Normal Normal mg/dL    Blood, Urine NEGATIVE NEGATIVE    Ketones, Urine NEGATIVE NEGATIVE mg/dL    Bilirubin, Urine NEGATIVE NEGATIVE    Urobilinogen, Urine Normal Normal mg/dL    Nitrite, Urine 2+ (A) NEGATIVE    Leukocyte Esterase, Urine 500 Amy/µL (A) NEGATIVE   Microscopic Only, Urine    Collection Time: 09/12/24  1:46 AM   Result Value Ref Range    WBC, Urine >50 (A) 1-5, NONE /HPF    WBC Clumps, Urine FEW Reference range not established. /HPF    RBC, Urine >20 (A) NONE, 1-2, 3-5 /HPF    Squamous Epithelial Cells, Urine 1-9 (SPARSE) Reference range not established. /HPF    Bacteria, Urine 4+ (A) NONE SEEN /HPF   Serial Troponin, 2 Hour ([facility])    Collection Time: 09/12/24  2:55 AM   Result Value Ref Range    Troponin T, High Sensitivity 16 (HH) <=14 ng/L           ED Course & MDM   ED Course as of 09/12/24 0430   Thu Sep 12, 2024   0053 EKG Time:0044  EKG Interpretation time:0053  EKG Interpretation: EKG shows normal sinus rhythm with a rate of 64 bpm, left axis deviation, QTc 464, no evidence of  STEMI.    EKG was interpreted by myself independently [JL]      ED Course User Index  [JL] Pro Crook DO         Diagnoses as of 09/12/24 0430   Abdominal pain, generalized   Urinary tract infection associated with indwelling urethral catheter, initial encounter (CMS-HCC)                 No data recorded     Thu Coma Scale Score: 15 (09/12/24 0342 : Cheyenne Benz RN)                           Medical Decision Making  Patient is an 89-year-old male that presents emergency room for evaluation of abdominal pain.  Patient uncomfortable pain but in no obvious distress on exam.  He is awake and alert and oriented to self and place which is baseline for the patient.  He is hemodynamically stable on arrival.  He does have generalized abdominal tenderness palpation however no rigidity or guarding.  Blood work ordered including CBC, CMP, lipase, lactic acid along with urinalysis and a CT scan of the abdomen pelvis.  EKG shows normal sinus rhythm with no evidence of STEMI or active ischemia.  Blood work was remarkable for normal white count of 4.5 with no left shift, normal electrolytes, normal kidney function.  He does evidence of urinary tract infection the patient was given an IV dose of ceftriaxone.  He is minimally elevated troponin of 16 however he has no chest pain and EKG shows no evidence of STEMI.  I have very low suspicion of ACS for patient source of pain.  He did have significant improvement with IV famotidine in the emergency department and feeling much better on reevaluation.  CT scan of the abdomen pelvis shows evidence of gastritis as well as diverticulosis without diverticulitis.  There is question of whether the Lucas catheter is positioned correctly and the catheter balloon was emptied and catheter was advanced with significant output of urine.  Lucas catheter balloon was then failed again to secure it.  Patient has remained hemodynamically stable and is resting comfortably on  reevaluation.  Repeat abdominal exam is benign.  Patient felt to be stable for discharge back to nursing facility at this time with follow-up with primary care physician.        Procedure  Procedures     Pro Crook DO  09/12/24 0435

## 2024-09-13 VITALS
RESPIRATION RATE: 16 BRPM | HEART RATE: 76 BPM | TEMPERATURE: 97.8 F | DIASTOLIC BLOOD PRESSURE: 76 MMHG | SYSTOLIC BLOOD PRESSURE: 124 MMHG

## 2024-09-13 LAB
ATRIAL RATE: 64 BPM
BACTERIA UR CULT: ABNORMAL
P AXIS: 16 DEGREES
P OFFSET: 206 MS
P ONSET: 152 MS
PR INTERVAL: 138 MS
Q ONSET: 221 MS
QRS COUNT: 11 BEATS
QRS DURATION: 84 MS
QT INTERVAL: 450 MS
QTC CALCULATION(BAZETT): 464 MS
QTC FREDERICIA: 459 MS
R AXIS: -16 DEGREES
T AXIS: 14 DEGREES
T OFFSET: 446 MS
VENTRICULAR RATE: 64 BPM

## 2024-09-13 ASSESSMENT — ENCOUNTER SYMPTOMS
CHILLS: 0
FEVER: 0

## 2024-09-13 NOTE — PROGRESS NOTES
PLACE OF SERVICE:  [facility]    This is a subsequent visit.    Subjective   Patient ID: Anthony Blair is a 89 y.o. male who presents for Follow-up.    Mr. Anthony Blair is an 89-year-old male with history of depression with Alzheimer's dementia.  He is unable to care for himself and manage his affairs.  He requires supportive care.    Review of Systems   Constitutional:  Negative for chills and fever.   Cardiovascular:  Negative for chest pain.   All other systems reviewed and are negative.    Objective   /76   Pulse 76   Temp 36.6 °C (97.8 °F)   Resp 16     Physical Exam  Vitals reviewed.   Constitutional:       General: He is not in acute distress.     Comments: This is a well-developed, well-nourished male, sitting in a chair   HENT:      Right Ear: Tympanic membrane, ear canal and external ear normal.      Left Ear: Tympanic membrane, ear canal and external ear normal.   Eyes:      General: No scleral icterus.     Pupils: Pupils are equal, round, and reactive to light.   Neck:      Vascular: No carotid bruit.   Cardiovascular:      Heart sounds: Normal heart sounds, S1 normal and S2 normal. No murmur heard.     No friction rub.   Pulmonary:      Effort: Pulmonary effort is normal.      Breath sounds: Normal breath sounds and air entry.   Abdominal:      Palpations: There is no hepatomegaly, splenomegaly or mass.   Musculoskeletal:         General: No swelling or deformity. Normal range of motion.      Cervical back: Neck supple.      Right lower leg: No edema.      Left lower leg: No edema.   Lymphadenopathy:      Cervical: No cervical adenopathy.      Upper Body:      Right upper body: No axillary adenopathy.      Left upper body: No axillary adenopathy.      Lower Body: No right inguinal adenopathy. No left inguinal adenopathy.   Neurological:      Mental Status: He is alert.      Cranial Nerves: Cranial nerves 2-12 are intact. No cranial nerve deficit.      Sensory: No sensory deficit.       Motor: Motor function is intact. No weakness.      Gait: Gait is intact.      Deep Tendon Reflexes: Reflexes normal.      Comments: The patient is oriented x2.   Psychiatric:         Mood and Affect: Mood normal. Mood is not anxious or depressed. Affect is not angry.         Behavior: Behavior is not agitated.         Thought Content: Thought content normal.         Judgment: Judgment normal.     LAB WORK:  Laboratory studies were reviewed.    Assessment/Plan   Problem List Items Addressed This Visit             ICD-10-CM       Gastrointestinal and Abdominal    Gastroesophageal reflux disease without esophagitis K21.9       Genitourinary and Reproductive    Benign prostatic hyperplasia without urinary obstruction N40.0       Mental Health    Depression F32.A       Musculoskeletal and Injuries    Osteoarthritis M19.90       Neuro    Dementia (Multi) - Primary F03.90    Intellectual disability F79     Other Visit Diagnoses         Codes    Hypertension, unspecified type     I10    Bipolar affective disorder, remission status unspecified (Multi)     F31.9    Hyperlipidemia, unspecified hyperlipidemia type     E78.5    ASHD (arteriosclerotic heart disease)     I25.10    Insomnia, unspecified type     G47.00        1. Dementia, unchanged.  2. Hypertension, medically controlled.  3. Bipolar disorder, on medication.  4. Depression, on medication.  5. Intellectual disability, on supportive care.  6. Hyperlipidemia, on statin.  7. ASHD, on aspirin.  8. BPH, on tamulosin.  9. Osteoarthritis, on Tylenol.  10. GERD, on PPI.  11. Insomnia, on melatonin.    Scribe Attestation  By signing my name below, IElizabeth Scribe attest that this documentation has been prepared under the direction and in the presence of Don Lozada MD.     All medical record entries made by the scribe were personally dictated by me I have reviewed the chart and agree the record accurately reflects my personal performance of his history physical  examination and management

## 2024-09-14 ENCOUNTER — TELEPHONE (OUTPATIENT)
Dept: PHARMACY | Facility: HOSPITAL | Age: 89
End: 2024-09-14
Payer: COMMERCIAL

## 2024-09-14 PROBLEM — N18.30 STAGE 3 CHRONIC KIDNEY DISEASE, UNSPECIFIED WHETHER STAGE 3A OR 3B CKD (MULTI): Status: ACTIVE | Noted: 2024-09-14

## 2024-09-14 PROBLEM — E46 PROTEIN-CALORIE MALNUTRITION, UNSPECIFIED SEVERITY (MULTI): Status: ACTIVE | Noted: 2024-09-14

## 2024-09-14 PROBLEM — F03.918 UNSPECIFIED DEMENTIA, UNSPECIFIED SEVERITY, WITH OTHER BEHAVIORAL DISTURBANCE: Status: ACTIVE | Noted: 2023-09-27

## 2024-09-14 NOTE — PROGRESS NOTES
EDPD Note: Lab/Chart Reviewed    Reviewed Mr./Mrs./Ms. Anthony Blair 's chart regarding a contaminated urine culture/result that was taken during their recent emergency room visit. The patient was transferred back to their rehab/LTC facility .Therefore, I have faxed this information to [facility] at fax number [phone] .    Contains abnormal data Urine Culture  Order: [id number] - Reflex for Order [id number]   Collected 9/12/2024 01:46       Status: Final result       Visible to patient: No (inaccessible in [facility])    Specimen Information: Indwelling (Lucas) Catheter; Urine   0 Result Notes  Urine Culture Multiple organisms present, probable contamination. Repeat culture if clinically indicated. Abnormal            Resulting Agency: [facility]           Specimen Collected: 09/12/24 01:46 Last Resulted: 09/13/24 09:48          No further follow up needed from EDPD Team.     Jossie Tubbs, LTAC, located within [facility]

## 2024-09-22 ENCOUNTER — NURSING HOME VISIT (OUTPATIENT)
Dept: POST ACUTE CARE | Facility: EXTERNAL LOCATION | Age: 89
End: 2024-09-22
Payer: COMMERCIAL

## 2024-09-22 DIAGNOSIS — F79 INTELLECTUAL DISABILITY: ICD-10-CM

## 2024-09-22 DIAGNOSIS — G47.00 INSOMNIA, UNSPECIFIED TYPE: ICD-10-CM

## 2024-09-22 DIAGNOSIS — K21.9 GASTROESOPHAGEAL REFLUX DISEASE WITHOUT ESOPHAGITIS: ICD-10-CM

## 2024-09-22 DIAGNOSIS — F32.A DEPRESSION, UNSPECIFIED DEPRESSION TYPE: ICD-10-CM

## 2024-09-22 DIAGNOSIS — R53.1 WEAKNESS: ICD-10-CM

## 2024-09-22 DIAGNOSIS — F31.9 BIPOLAR AFFECTIVE DISORDER, REMISSION STATUS UNSPECIFIED (MULTI): ICD-10-CM

## 2024-09-22 DIAGNOSIS — Z91.81 AT RISK FOR FALLS: ICD-10-CM

## 2024-09-22 DIAGNOSIS — N40.0 BENIGN PROSTATIC HYPERPLASIA WITHOUT URINARY OBSTRUCTION: ICD-10-CM

## 2024-09-22 DIAGNOSIS — I10 HYPERTENSION, UNSPECIFIED TYPE: ICD-10-CM

## 2024-09-22 DIAGNOSIS — F03.90 DEMENTIA, UNSPECIFIED DEMENTIA SEVERITY, UNSPECIFIED DEMENTIA TYPE, UNSPECIFIED WHETHER BEHAVIORAL, PSYCHOTIC, OR MOOD DISTURBANCE OR ANXIETY (MULTI): Primary | ICD-10-CM

## 2024-09-22 DIAGNOSIS — M19.90 OSTEOARTHRITIS, UNSPECIFIED OSTEOARTHRITIS TYPE, UNSPECIFIED SITE: ICD-10-CM

## 2024-09-22 DIAGNOSIS — E78.5 HYPERLIPIDEMIA, UNSPECIFIED HYPERLIPIDEMIA TYPE: ICD-10-CM

## 2024-09-22 PROCEDURE — 99309 SBSQ NF CARE MODERATE MDM 30: CPT | Performed by: INTERNAL MEDICINE

## 2024-09-22 NOTE — LETTER
Patient: Anthony Blair  : 1935    Encounter Date: 2024    PLACE OF SERVICE:  [facility], [place].    This is a subsequent visit.    Subjective  Patient ID: Anthony Blair is a 89 y.o. male who presents for Follow-up.    Mr. Anthony Blair is an 89-year-old male with history of Alzheimer's dementia.  He suffers from depression as well as intellectual disability.  He is unable to care for himself and requires supportive care.    Review of Systems   Constitutional:  Negative for chills and fever.   Cardiovascular:  Negative for chest pain.   All other systems reviewed and are negative.    Objective  /78   Pulse 76   Temp 36.5 °C (97.7 °F)   Resp 16     Physical Exam  Vitals reviewed.   Constitutional:       General: He is not in acute distress.     Comments: This is a well-developed, well-nourished male, sitting in a chair.   HENT:      Right Ear: Tympanic membrane, ear canal and external ear normal.      Left Ear: Tympanic membrane, ear canal and external ear normal.   Eyes:      General: No scleral icterus.     Pupils: Pupils are equal, round, and reactive to light.   Neck:      Vascular: No carotid bruit.   Cardiovascular:      Heart sounds: Normal heart sounds, S1 normal and S2 normal. No murmur heard.     No friction rub.   Pulmonary:      Effort: Pulmonary effort is normal.      Breath sounds: Normal breath sounds and air entry.   Abdominal:      Palpations: There is no hepatomegaly, splenomegaly or mass.   Musculoskeletal:         General: No swelling or deformity. Normal range of motion.      Cervical back: Neck supple.      Right lower leg: No edema.      Left lower leg: No edema.   Lymphadenopathy:      Cervical: No cervical adenopathy.      Upper Body:      Right upper body: No axillary adenopathy.      Left upper body: No axillary adenopathy.      Lower Body: No right inguinal adenopathy. No left inguinal adenopathy.   Neurological:      Mental Status: He is alert.      Cranial Nerves:  Cranial nerves 2-12 are intact. No cranial nerve deficit.      Sensory: No sensory deficit.      Motor: Motor function is intact. No weakness.      Gait: Gait is intact.      Deep Tendon Reflexes: Reflexes normal.      Comments: The patient is oriented x2.   Psychiatric:         Mood and Affect: Mood normal. Mood is not anxious or depressed. Affect is not angry.         Behavior: Behavior is not agitated.         Thought Content: Thought content normal.         Judgment: Judgment normal.     LAB WORK: Laboratory studies reviewed.    Assessment/Plan  Problem List Items Addressed This Visit             ICD-10-CM       Advance Directives and General Issues    At risk for falls Z91.81       Gastrointestinal and Abdominal    Gastroesophageal reflux disease without esophagitis K21.9       Genitourinary and Reproductive    Benign prostatic hyperplasia without urinary obstruction N40.0       Mental Health    Depression F32.A       Musculoskeletal and Injuries    Osteoarthritis M19.90       Neuro    Intellectual disability F79     Other Visit Diagnoses         Codes    Dementia, unspecified dementia severity, unspecified dementia type, unspecified whether behavioral, psychotic, or mood disturbance or anxiety (Multi)    -  Primary F03.90    Bipolar affective disorder, remission status unspecified (Multi)     F31.9    Hypertension, unspecified type     I10    Hyperlipidemia, unspecified hyperlipidemia type     E78.5    Weakness     R53.1    Insomnia, unspecified type     G47.00        1. Dementia, unchanged.  2. Intellectual disability, on supportive care.  3. Depression, on medication.  4. BPH, on tamsulosin.  5. Bipolar disorder, on medication.  6. Hypertension, medically controlled.  7. Hyperlipidemia, on statin.  8. GERD, on PPI.  9. Osteoarthritis, on Tylenol.  10. Weakness, on PT/OT.  11. Fall risk, fall precautions.  12. Insomnia, on melatonin.    Scribe Attestation  By signing my name below, Antonette REYES, Scribe attest  that this documentation has been prepared under the direction and in the presence of Don Lozada MD.     All medical record entries made by the scribe were personally dictated by me I have reviewed the chart and agree the record accurately reflects my personal performance of his history physical examination and management      Electronically Signed By: Don Lozada MD   9/27/24  9:52 PM

## 2024-09-25 VITALS
TEMPERATURE: 97.7 F | HEART RATE: 76 BPM | SYSTOLIC BLOOD PRESSURE: 128 MMHG | RESPIRATION RATE: 16 BRPM | DIASTOLIC BLOOD PRESSURE: 78 MMHG

## 2024-09-25 ASSESSMENT — ENCOUNTER SYMPTOMS
CHILLS: 0
FEVER: 0

## 2024-09-25 NOTE — PROGRESS NOTES
PLACE OF SERVICE:  [facility].    This is a subsequent visit.    Subjective   Patient ID: Anthony Blair is a 89 y.o. male who presents for Follow-up.    Mr. Anthony Blair is an 89-year-old male with history of Alzheimer's dementia.  He suffers from depression as well as intellectual disability.  He is unable to care for himself and requires supportive care.    Review of Systems   Constitutional:  Negative for chills and fever.   Cardiovascular:  Negative for chest pain.   All other systems reviewed and are negative.    Objective   /78   Pulse 76   Temp 36.5 °C (97.7 °F)   Resp 16     Physical Exam  Vitals reviewed.   Constitutional:       General: He is not in acute distress.     Comments: This is a well-developed, well-nourished male, sitting in a chair.   HENT:      Right Ear: Tympanic membrane, ear canal and external ear normal.      Left Ear: Tympanic membrane, ear canal and external ear normal.   Eyes:      General: No scleral icterus.     Pupils: Pupils are equal, round, and reactive to light.   Neck:      Vascular: No carotid bruit.   Cardiovascular:      Heart sounds: Normal heart sounds, S1 normal and S2 normal. No murmur heard.     No friction rub.   Pulmonary:      Effort: Pulmonary effort is normal.      Breath sounds: Normal breath sounds and air entry.   Abdominal:      Palpations: There is no hepatomegaly, splenomegaly or mass.   Musculoskeletal:         General: No swelling or deformity. Normal range of motion.      Cervical back: Neck supple.      Right lower leg: No edema.      Left lower leg: No edema.   Lymphadenopathy:      Cervical: No cervical adenopathy.      Upper Body:      Right upper body: No axillary adenopathy.      Left upper body: No axillary adenopathy.      Lower Body: No right inguinal adenopathy. No left inguinal adenopathy.   Neurological:      Mental Status: He is alert.      Cranial Nerves: Cranial nerves 2-12 are intact. No cranial nerve deficit.      Sensory:  No sensory deficit.      Motor: Motor function is intact. No weakness.      Gait: Gait is intact.      Deep Tendon Reflexes: Reflexes normal.      Comments: The patient is oriented x2.   Psychiatric:         Mood and Affect: Mood normal. Mood is not anxious or depressed. Affect is not angry.         Behavior: Behavior is not agitated.         Thought Content: Thought content normal.         Judgment: Judgment normal.     LAB WORK: Laboratory studies reviewed.    Assessment/Plan   Problem List Items Addressed This Visit             ICD-10-CM       Advance Directives and General Issues    At risk for falls Z91.81       Gastrointestinal and Abdominal    Gastroesophageal reflux disease without esophagitis K21.9       Genitourinary and Reproductive    Benign prostatic hyperplasia without urinary obstruction N40.0       Mental Health    Depression F32.A       Musculoskeletal and Injuries    Osteoarthritis M19.90       Neuro    Intellectual disability F79     Other Visit Diagnoses         Codes    Dementia, unspecified dementia severity, unspecified dementia type, unspecified whether behavioral, psychotic, or mood disturbance or anxiety (Multi)    -  Primary F03.90    Bipolar affective disorder, remission status unspecified (Multi)     F31.9    Hypertension, unspecified type     I10    Hyperlipidemia, unspecified hyperlipidemia type     E78.5    Weakness     R53.1    Insomnia, unspecified type     G47.00        1. Dementia, unchanged.  2. Intellectual disability, on supportive care.  3. Depression, on medication.  4. BPH, on tamsulosin.  5. Bipolar disorder, on medication.  6. Hypertension, medically controlled.  7. Hyperlipidemia, on statin.  8. GERD, on PPI.  9. Osteoarthritis, on Tylenol.  10. Weakness, on PT/OT.  11. Fall risk, fall precautions.  12. Insomnia, on melatonin.    Scribe Attestation  By signing my name below, IAntonette, Lawrenceibtito attest that this documentation has been prepared under the direction and in  the presence of Don Lozada MD.     All medical record entries made by the scribe were personally dictated by me I have reviewed the chart and agree the record accurately reflects my personal performance of his history physical examination and management

## 2024-09-28 ENCOUNTER — NURSING HOME VISIT (OUTPATIENT)
Dept: POST ACUTE CARE | Facility: EXTERNAL LOCATION | Age: 89
End: 2024-09-28
Payer: COMMERCIAL

## 2024-09-28 DIAGNOSIS — E78.5 HYPERLIPIDEMIA, UNSPECIFIED HYPERLIPIDEMIA TYPE: ICD-10-CM

## 2024-09-28 DIAGNOSIS — M19.90 OSTEOARTHRITIS, UNSPECIFIED OSTEOARTHRITIS TYPE, UNSPECIFIED SITE: ICD-10-CM

## 2024-09-28 DIAGNOSIS — Z91.81 AT RISK FOR FALLS: ICD-10-CM

## 2024-09-28 DIAGNOSIS — G47.00 INSOMNIA, UNSPECIFIED TYPE: ICD-10-CM

## 2024-09-28 DIAGNOSIS — K21.9 GASTROESOPHAGEAL REFLUX DISEASE WITHOUT ESOPHAGITIS: ICD-10-CM

## 2024-09-28 DIAGNOSIS — F32.A DEPRESSION, UNSPECIFIED DEPRESSION TYPE: ICD-10-CM

## 2024-09-28 DIAGNOSIS — R53.1 WEAKNESS: ICD-10-CM

## 2024-09-28 DIAGNOSIS — N40.0 BENIGN PROSTATIC HYPERPLASIA WITHOUT URINARY OBSTRUCTION: ICD-10-CM

## 2024-09-28 DIAGNOSIS — F03.90 DEMENTIA, UNSPECIFIED DEMENTIA SEVERITY, UNSPECIFIED DEMENTIA TYPE, UNSPECIFIED WHETHER BEHAVIORAL, PSYCHOTIC, OR MOOD DISTURBANCE OR ANXIETY (MULTI): ICD-10-CM

## 2024-09-28 DIAGNOSIS — F79 INTELLECTUAL DISABILITY: ICD-10-CM

## 2024-09-28 DIAGNOSIS — I10 HYPERTENSION, UNSPECIFIED TYPE: ICD-10-CM

## 2024-09-28 DIAGNOSIS — F31.9 BIPOLAR AFFECTIVE DISORDER, REMISSION STATUS UNSPECIFIED (MULTI): Primary | ICD-10-CM

## 2024-09-28 PROCEDURE — 99309 SBSQ NF CARE MODERATE MDM 30: CPT | Performed by: INTERNAL MEDICINE

## 2024-09-28 NOTE — LETTER
Patient: Anthony Blair  : 1935    Encounter Date: 2024    PLACE OF SERVICE:  [facility], [place].    This is a subsequent visit.    Subjective  Patient ID: Anthony Blair is a 89 y.o. male who presents for Follow-up.    Mr. Anthony Blair is an 89-year-old male with history of dementia with depression and bipolar disorder.  He is unable to care for himself and requires supportive care.    Review of Systems   Constitutional:  Negative for chills and fever.   Cardiovascular:  Negative for chest pain.   All other systems reviewed and are negative.    Objective  /80   Pulse 74   Temp 36.5 °C (97.7 °F)   Resp 16     Physical Exam  Vitals reviewed.   Constitutional:       General: He is not in acute distress.     Comments: This is a well-developed, well-nourished male, sitting in a chair.   HENT:      Right Ear: Tympanic membrane, ear canal and external ear normal.      Left Ear: Tympanic membrane, ear canal and external ear normal.   Eyes:      General: No scleral icterus.     Pupils: Pupils are equal, round, and reactive to light.   Neck:      Vascular: No carotid bruit.   Cardiovascular:      Heart sounds: Normal heart sounds, S1 normal and S2 normal. No murmur heard.     No friction rub.   Pulmonary:      Effort: Pulmonary effort is normal.      Breath sounds: Normal breath sounds and air entry.   Abdominal:      Palpations: There is no hepatomegaly, splenomegaly or mass.   Musculoskeletal:         General: No swelling or deformity. Normal range of motion.      Cervical back: Neck supple.      Right lower leg: No edema.      Left lower leg: No edema.   Lymphadenopathy:      Cervical: No cervical adenopathy.      Upper Body:      Right upper body: No axillary adenopathy.      Left upper body: No axillary adenopathy.      Lower Body: No right inguinal adenopathy. No left inguinal adenopathy.   Neurological:      Mental Status: He is alert.      Cranial Nerves: Cranial nerves 2-12 are intact. No cranial  nerve deficit.      Sensory: No sensory deficit.      Motor: Motor function is intact. No weakness.      Gait: Gait is intact.      Deep Tendon Reflexes: Reflexes normal.      Comments: The patient is oriented x2.   Psychiatric:         Mood and Affect: Mood normal. Mood is not anxious or depressed. Affect is not angry.         Behavior: Behavior is not agitated.         Thought Content: Thought content normal.         Judgment: Judgment normal.     LAB WORK: Laboratory studies were reviewed.    Assessment/Plan  Problem List Items Addressed This Visit             ICD-10-CM       Advance Directives and General Issues    At risk for falls Z91.81       Gastrointestinal and Abdominal    Gastroesophageal reflux disease without esophagitis K21.9       Genitourinary and Reproductive    Benign prostatic hyperplasia without urinary obstruction N40.0       Mental Health    Depression F32.A       Musculoskeletal and Injuries    Osteoarthritis M19.90       Neuro    Intellectual disability F79     Other Visit Diagnoses         Codes    Bipolar affective disorder, remission status unspecified (Multi)    -  Primary F31.9    Dementia, unspecified dementia severity, unspecified dementia type, unspecified whether behavioral, psychotic, or mood disturbance or anxiety (Multi)     F03.90    Hypertension, unspecified type     I10    Hyperlipidemia, unspecified hyperlipidemia type     E78.5    Insomnia, unspecified type     G47.00    Weakness     R53.1        1. Bipolar disorder, on medication.  2. Intellectual disability, on supportive care.  3. Depression, on medication.  4. Dementia, unchanged.  5. Hypertension, medically controlled.  6. Hyperlipidemia, on statin.  7. BPH, on tamsulosin.  8. Osteoarthritis, on Tylenol.  9. Insomnia, on melatonin.  10. GERD, on PPI.  11. Weakness, on PT/OT.  12. Fall risk, on fall precautions.    Scribe Attestation  By signing my name below, I, Antonette Bravo, Scribe attest that this documentation has been  prepared under the direction and in the presence of Don Lozada MD.     All medical record entries made by the scribe were personally dictated by me I have reviewed the chart and agree the record accurately reflects my personal performance of his history physical examination and management      Electronically Signed By: Don Lozada MD   9/30/24 11:32 PM

## 2024-09-30 VITALS
TEMPERATURE: 97.7 F | RESPIRATION RATE: 16 BRPM | HEART RATE: 74 BPM | SYSTOLIC BLOOD PRESSURE: 124 MMHG | DIASTOLIC BLOOD PRESSURE: 80 MMHG

## 2024-09-30 ASSESSMENT — ENCOUNTER SYMPTOMS
FEVER: 0
CHILLS: 0

## 2024-09-30 NOTE — PROGRESS NOTES
PLACE OF SERVICE:  [facility].    This is a subsequent visit.    Subjective   Patient ID: Anthony Blair is a 89 y.o. male who presents for Follow-up.    Mr. Anthony Blair is an 89-year-old male with history of dementia with depression and bipolar disorder.  He is unable to care for himself and requires supportive care.    Review of Systems   Constitutional:  Negative for chills and fever.   Cardiovascular:  Negative for chest pain.   All other systems reviewed and are negative.    Objective   /80   Pulse 74   Temp 36.5 °C (97.7 °F)   Resp 16     Physical Exam  Vitals reviewed.   Constitutional:       General: He is not in acute distress.     Comments: This is a well-developed, well-nourished male, sitting in a chair.   HENT:      Right Ear: Tympanic membrane, ear canal and external ear normal.      Left Ear: Tympanic membrane, ear canal and external ear normal.   Eyes:      General: No scleral icterus.     Pupils: Pupils are equal, round, and reactive to light.   Neck:      Vascular: No carotid bruit.   Cardiovascular:      Heart sounds: Normal heart sounds, S1 normal and S2 normal. No murmur heard.     No friction rub.   Pulmonary:      Effort: Pulmonary effort is normal.      Breath sounds: Normal breath sounds and air entry.   Abdominal:      Palpations: There is no hepatomegaly, splenomegaly or mass.   Musculoskeletal:         General: No swelling or deformity. Normal range of motion.      Cervical back: Neck supple.      Right lower leg: No edema.      Left lower leg: No edema.   Lymphadenopathy:      Cervical: No cervical adenopathy.      Upper Body:      Right upper body: No axillary adenopathy.      Left upper body: No axillary adenopathy.      Lower Body: No right inguinal adenopathy. No left inguinal adenopathy.   Neurological:      Mental Status: He is alert.      Cranial Nerves: Cranial nerves 2-12 are intact. No cranial nerve deficit.      Sensory: No sensory deficit.      Motor: Motor  function is intact. No weakness.      Gait: Gait is intact.      Deep Tendon Reflexes: Reflexes normal.      Comments: The patient is oriented x2.   Psychiatric:         Mood and Affect: Mood normal. Mood is not anxious or depressed. Affect is not angry.         Behavior: Behavior is not agitated.         Thought Content: Thought content normal.         Judgment: Judgment normal.     LAB WORK: Laboratory studies were reviewed.    Assessment/Plan   Problem List Items Addressed This Visit             ICD-10-CM       Advance Directives and General Issues    At risk for falls Z91.81       Gastrointestinal and Abdominal    Gastroesophageal reflux disease without esophagitis K21.9       Genitourinary and Reproductive    Benign prostatic hyperplasia without urinary obstruction N40.0       Mental Health    Depression F32.A       Musculoskeletal and Injuries    Osteoarthritis M19.90       Neuro    Intellectual disability F79     Other Visit Diagnoses         Codes    Bipolar affective disorder, remission status unspecified (Multi)    -  Primary F31.9    Dementia, unspecified dementia severity, unspecified dementia type, unspecified whether behavioral, psychotic, or mood disturbance or anxiety (Multi)     F03.90    Hypertension, unspecified type     I10    Hyperlipidemia, unspecified hyperlipidemia type     E78.5    Insomnia, unspecified type     G47.00    Weakness     R53.1        1. Bipolar disorder, on medication.  2. Intellectual disability, on supportive care.  3. Depression, on medication.  4. Dementia, unchanged.  5. Hypertension, medically controlled.  6. Hyperlipidemia, on statin.  7. BPH, on tamsulosin.  8. Osteoarthritis, on Tylenol.  9. Insomnia, on melatonin.  10. GERD, on PPI.  11. Weakness, on PT/OT.  12. Fall risk, on fall precautions.    Scribe Attestation  By signing my name below, IAntonette, Scribtito attest that this documentation has been prepared under the direction and in the presence of Don  MD Neville.     All medical record entries made by the scribe were personally dictated by me I have reviewed the chart and agree the record accurately reflects my personal performance of his history physical examination and management

## 2024-10-08 ENCOUNTER — NURSING HOME VISIT (OUTPATIENT)
Dept: POST ACUTE CARE | Facility: EXTERNAL LOCATION | Age: 89
End: 2024-10-08
Payer: COMMERCIAL

## 2024-10-08 DIAGNOSIS — I25.10 ASHD (ARTERIOSCLEROTIC HEART DISEASE): ICD-10-CM

## 2024-10-08 DIAGNOSIS — I10 HYPERTENSION, UNSPECIFIED TYPE: ICD-10-CM

## 2024-10-08 DIAGNOSIS — Z91.81 AT RISK FOR FALLS: ICD-10-CM

## 2024-10-08 DIAGNOSIS — F32.A DEPRESSION, UNSPECIFIED DEPRESSION TYPE: ICD-10-CM

## 2024-10-08 DIAGNOSIS — F03.90 DEMENTIA, UNSPECIFIED DEMENTIA SEVERITY, UNSPECIFIED DEMENTIA TYPE, UNSPECIFIED WHETHER BEHAVIORAL, PSYCHOTIC, OR MOOD DISTURBANCE OR ANXIETY (MULTI): Primary | ICD-10-CM

## 2024-10-08 DIAGNOSIS — K21.9 GASTROESOPHAGEAL REFLUX DISEASE WITHOUT ESOPHAGITIS: ICD-10-CM

## 2024-10-08 DIAGNOSIS — N40.0 BENIGN PROSTATIC HYPERPLASIA WITHOUT URINARY OBSTRUCTION: ICD-10-CM

## 2024-10-08 DIAGNOSIS — R53.1 WEAKNESS: ICD-10-CM

## 2024-10-08 DIAGNOSIS — F79 INTELLECTUAL DISABILITY: ICD-10-CM

## 2024-10-08 DIAGNOSIS — E78.5 HYPERLIPIDEMIA, UNSPECIFIED HYPERLIPIDEMIA TYPE: ICD-10-CM

## 2024-10-08 DIAGNOSIS — M19.90 OSTEOARTHRITIS, UNSPECIFIED OSTEOARTHRITIS TYPE, UNSPECIFIED SITE: ICD-10-CM

## 2024-10-08 DIAGNOSIS — F31.9 BIPOLAR AFFECTIVE DISORDER, REMISSION STATUS UNSPECIFIED (MULTI): ICD-10-CM

## 2024-10-08 PROCEDURE — 99309 SBSQ NF CARE MODERATE MDM 30: CPT | Performed by: INTERNAL MEDICINE

## 2024-10-08 NOTE — LETTER
Patient: Anthony Blair  : 1935    Encounter Date: 10/08/2024    PLACE OF SERVICE:  [facility]    This is a subsequent visit.    Subjective  Patient ID: Anthony Blair is a 89 y.o. male who presents for Follow-up.    Mr. Anthony Blair is an 89-year-old male with history of dementia with depression.  He is at fall risk and requires supportive care.    Review of Systems   Constitutional:  Negative for chills and fever.   Cardiovascular:  Negative for chest pain.   All other systems reviewed and are negative.    Objective  /80   Pulse 76   Temp 36.5 °C (97.7 °F)   Resp 16     Physical Exam  Vitals reviewed.   Constitutional:       General: He is not in acute distress.     Comments: This is a well-developed, well-nourished male, sitting in a chair.   HENT:      Right Ear: Tympanic membrane, ear canal and external ear normal.      Left Ear: Tympanic membrane, ear canal and external ear normal.   Eyes:      General: No scleral icterus.     Pupils: Pupils are equal, round, and reactive to light.   Neck:      Vascular: No carotid bruit.   Cardiovascular:      Heart sounds: Normal heart sounds, S1 normal and S2 normal. No murmur heard.     No friction rub.   Pulmonary:      Effort: Pulmonary effort is normal.      Breath sounds: Normal breath sounds and air entry.   Abdominal:      Palpations: There is no hepatomegaly, splenomegaly or mass.   Musculoskeletal:         General: No swelling or deformity. Normal range of motion.      Cervical back: Neck supple.      Right lower leg: No edema.      Left lower leg: No edema.   Lymphadenopathy:      Cervical: No cervical adenopathy.      Upper Body:      Right upper body: No axillary adenopathy.      Left upper body: No axillary adenopathy.      Lower Body: No right inguinal adenopathy. No left inguinal adenopathy.   Neurological:      Mental Status: He is alert.      Cranial Nerves: Cranial nerves 2-12 are intact. No cranial nerve deficit.      Sensory: No  sensory deficit.      Motor: Motor function is intact. No weakness.      Gait: Gait is intact.      Deep Tendon Reflexes: Reflexes normal.      Comments: The patient is oriented x2.   Psychiatric:         Mood and Affect: Mood normal. Mood is not anxious or depressed. Affect is not angry.         Behavior: Behavior is not agitated.         Thought Content: Thought content normal.         Judgment: Judgment normal.     LAB WORK: Laboratory studies reviewed.    Assessment/Plan  Problem List Items Addressed This Visit             ICD-10-CM       Advance Directives and General Issues    At risk for falls Z91.81       Gastrointestinal and Abdominal    Gastroesophageal reflux disease without esophagitis K21.9       Genitourinary and Reproductive    Benign prostatic hyperplasia without urinary obstruction N40.0       Mental Health    Depression F32.A       Musculoskeletal and Injuries    Osteoarthritis M19.90       Neuro    Intellectual disability F79     Other Visit Diagnoses         Codes    Dementia, unspecified dementia severity, unspecified dementia type, unspecified whether behavioral, psychotic, or mood disturbance or anxiety (Multi)    -  Primary F03.90    Bipolar affective disorder, remission status unspecified (Multi)     F31.9    ASHD (arteriosclerotic heart disease)     I25.10    Hyperlipidemia, unspecified hyperlipidemia type     E78.5    Hypertension, unspecified type     I10    Weakness     R53.1        1. Dementia, unchanged.  2. Depression, on medication.  3. Bipolar disorder, on medication.  4. ASHD, on aspirin.  5. BPH, on tamsulosin.  6. GERD, on PPI.  7. Intellectual disability, on supportive care.  8. Osteoarthritis, on Tylenol.  9. Hyperlipidemia, on statin.  10. Hypertension, medically controlled.  11. Weakness, on PT/OT.  12. Fall risk, fall precautions.    Scribe Attestation  By signing my name below, IElizabeth Scribe attest that this documentation has been prepared under the direction and in  the presence of Don Lozada MD.     All medical record entries made by the scribe were personally dictated by me I have reviewed the chart and agree the record accurately reflects my personal performance of his history physical examination and management      Electronically Signed By: Don Lozada MD   10/21/24 11:35 PM

## 2024-10-13 ENCOUNTER — NURSING HOME VISIT (OUTPATIENT)
Dept: POST ACUTE CARE | Facility: EXTERNAL LOCATION | Age: 89
End: 2024-10-13
Payer: COMMERCIAL

## 2024-10-13 DIAGNOSIS — I10 HYPERTENSION, UNSPECIFIED TYPE: ICD-10-CM

## 2024-10-13 DIAGNOSIS — E78.5 HYPERLIPIDEMIA, UNSPECIFIED HYPERLIPIDEMIA TYPE: ICD-10-CM

## 2024-10-13 DIAGNOSIS — N40.0 BENIGN PROSTATIC HYPERPLASIA WITHOUT URINARY OBSTRUCTION: ICD-10-CM

## 2024-10-13 DIAGNOSIS — F31.9 BIPOLAR AFFECTIVE DISORDER, REMISSION STATUS UNSPECIFIED (MULTI): ICD-10-CM

## 2024-10-13 DIAGNOSIS — F79 INTELLECTUAL DISABILITY: ICD-10-CM

## 2024-10-13 DIAGNOSIS — K21.9 GASTROESOPHAGEAL REFLUX DISEASE WITHOUT ESOPHAGITIS: ICD-10-CM

## 2024-10-13 DIAGNOSIS — F32.A DEPRESSION, UNSPECIFIED DEPRESSION TYPE: ICD-10-CM

## 2024-10-13 DIAGNOSIS — Z91.81 AT RISK FOR FALLS: ICD-10-CM

## 2024-10-13 DIAGNOSIS — G62.9 NEUROPATHY: ICD-10-CM

## 2024-10-13 DIAGNOSIS — M19.90 OSTEOARTHRITIS, UNSPECIFIED OSTEOARTHRITIS TYPE, UNSPECIFIED SITE: ICD-10-CM

## 2024-10-13 DIAGNOSIS — R53.1 WEAKNESS: ICD-10-CM

## 2024-10-13 DIAGNOSIS — F03.90 DEMENTIA, UNSPECIFIED DEMENTIA SEVERITY, UNSPECIFIED DEMENTIA TYPE, UNSPECIFIED WHETHER BEHAVIORAL, PSYCHOTIC, OR MOOD DISTURBANCE OR ANXIETY (MULTI): Primary | ICD-10-CM

## 2024-10-13 PROCEDURE — 99309 SBSQ NF CARE MODERATE MDM 30: CPT | Performed by: INTERNAL MEDICINE

## 2024-10-13 NOTE — LETTER
Patient: Anthony Blair  : 1935    Encounter Date: 10/13/2024    PLACE OF SERVICE:  [facility]    This is a subsequent visit.    Subjective  Patient ID: Anthony Blair is a 89 y.o. male who presents for Follow-up.    Mr. Anthony Blair is an 89-year-old male with history of Alzheimer's dementia.  He suffers from bipolar disorder as well as intellectual disability.  He is unable to care for himself and manage his affairs. He requires supportive care.    Review of Systems   Constitutional:  Negative for chills and fever.   Cardiovascular:  Negative for chest pain.   All other systems reviewed and are negative.    Objective  /74   Pulse 74   Temp 36.6 °C (97.8 °F)   Resp 16     Physical Exam  Vitals reviewed.   Constitutional:       General: He is not in acute distress.     Comments: This is a well-developed, well-nourished male, sitting in a chair   HENT:      Right Ear: Tympanic membrane, ear canal and external ear normal.      Left Ear: Tympanic membrane, ear canal and external ear normal.   Eyes:      General: No scleral icterus.     Pupils: Pupils are equal, round, and reactive to light.   Neck:      Vascular: No carotid bruit.   Cardiovascular:      Heart sounds: Normal heart sounds, S1 normal and S2 normal. No murmur heard.     No friction rub.   Pulmonary:      Effort: Pulmonary effort is normal.      Breath sounds: Normal breath sounds and air entry.   Abdominal:      Palpations: There is no hepatomegaly, splenomegaly or mass.   Musculoskeletal:         General: No swelling or deformity. Normal range of motion.      Cervical back: Neck supple.      Right lower leg: No edema.      Left lower leg: No edema.   Lymphadenopathy:      Cervical: No cervical adenopathy.      Upper Body:      Right upper body: No axillary adenopathy.      Left upper body: No axillary adenopathy.      Lower Body: No right inguinal adenopathy. No left inguinal adenopathy.   Neurological:      Mental Status: He is  alert.      Cranial Nerves: Cranial nerves 2-12 are intact. No cranial nerve deficit.      Sensory: No sensory deficit.      Motor: Motor function is intact. No weakness.      Gait: Gait is intact.      Deep Tendon Reflexes: Reflexes normal.      Comments: The patient is oriented x2.   Psychiatric:         Mood and Affect: Mood normal. Mood is not anxious or depressed. Affect is not angry.         Behavior: Behavior is not agitated.         Thought Content: Thought content normal.         Judgment: Judgment normal.     LAB WORK: Laboratory studies reviewed.    Assessment/Plan  Problem List Items Addressed This Visit             ICD-10-CM       Advance Directives and General Issues    At risk for falls Z91.81       Gastrointestinal and Abdominal    Gastroesophageal reflux disease without esophagitis K21.9       Genitourinary and Reproductive    Benign prostatic hyperplasia without urinary obstruction N40.0       Mental Health    Depression F32.A       Musculoskeletal and Injuries    Osteoarthritis M19.90       Neuro    Intellectual disability F79     Other Visit Diagnoses         Codes    Dementia, unspecified dementia severity, unspecified dementia type, unspecified whether behavioral, psychotic, or mood disturbance or anxiety (Multi)    -  Primary F03.90    Bipolar affective disorder, remission status unspecified (Multi)     F31.9    Weakness     R53.1    Hyperlipidemia, unspecified hyperlipidemia type     E78.5    Hypertension, unspecified type     I10    Neuropathy     G62.9        1. Dementia, unchanged.  2. Intellectual disability, on supportive care.  3. Bipolar disorder, on medication.  4. Depression, on medication.  5. Weakness, on PT/OT.  6. Fall risk, on fall precautions.  7. BPH, on tamsulosin.  8. Hyperlipidemia, on statin.  9. Hypertension, med controlled.  10. GERD, on PPI.  11. Osteoarthritis, on Tylenol.  12. Neuropathy, on gabapentin.    Scribe Attestation  By signing my name below, I, Elizabeth Garnett,  Scribe attest that this documentation has been prepared under the direction and in the presence of Don Lozada MD.     All medical record entries made by the scribe were personally dictated by me I have reviewed the chart and agree the record accurately reflects my personal performance of his history physical examination and management      Electronically Signed By: Don Lozada MD   10/22/24 12:46 AM

## 2024-10-15 VITALS
DIASTOLIC BLOOD PRESSURE: 74 MMHG | RESPIRATION RATE: 16 BRPM | TEMPERATURE: 97.8 F | HEART RATE: 74 BPM | SYSTOLIC BLOOD PRESSURE: 120 MMHG

## 2024-10-15 VITALS
SYSTOLIC BLOOD PRESSURE: 124 MMHG | HEART RATE: 76 BPM | DIASTOLIC BLOOD PRESSURE: 80 MMHG | TEMPERATURE: 97.7 F | RESPIRATION RATE: 16 BRPM

## 2024-10-15 ASSESSMENT — ENCOUNTER SYMPTOMS
FEVER: 0
CHILLS: 0
CHILLS: 0
FEVER: 0

## 2024-10-15 NOTE — PROGRESS NOTES
PLACE OF SERVICE:  [facility]    This is a subsequent visit.    Subjective   Patient ID: Anthony Blair is a 89 y.o. male who presents for Follow-up.    Mr. Anthony Blair is an 89-year-old male with history of dementia with depression.  He is at fall risk and requires supportive care.    Review of Systems   Constitutional:  Negative for chills and fever.   Cardiovascular:  Negative for chest pain.   All other systems reviewed and are negative.    Objective   /80   Pulse 76   Temp 36.5 °C (97.7 °F)   Resp 16     Physical Exam  Vitals reviewed.   Constitutional:       General: He is not in acute distress.     Comments: This is a well-developed, well-nourished male, sitting in a chair.   HENT:      Right Ear: Tympanic membrane, ear canal and external ear normal.      Left Ear: Tympanic membrane, ear canal and external ear normal.   Eyes:      General: No scleral icterus.     Pupils: Pupils are equal, round, and reactive to light.   Neck:      Vascular: No carotid bruit.   Cardiovascular:      Heart sounds: Normal heart sounds, S1 normal and S2 normal. No murmur heard.     No friction rub.   Pulmonary:      Effort: Pulmonary effort is normal.      Breath sounds: Normal breath sounds and air entry.   Abdominal:      Palpations: There is no hepatomegaly, splenomegaly or mass.   Musculoskeletal:         General: No swelling or deformity. Normal range of motion.      Cervical back: Neck supple.      Right lower leg: No edema.      Left lower leg: No edema.   Lymphadenopathy:      Cervical: No cervical adenopathy.      Upper Body:      Right upper body: No axillary adenopathy.      Left upper body: No axillary adenopathy.      Lower Body: No right inguinal adenopathy. No left inguinal adenopathy.   Neurological:      Mental Status: He is alert.      Cranial Nerves: Cranial nerves 2-12 are intact. No cranial nerve deficit.      Sensory: No sensory deficit.      Motor: Motor function is intact. No weakness.       Gait: Gait is intact.      Deep Tendon Reflexes: Reflexes normal.      Comments: The patient is oriented x2.   Psychiatric:         Mood and Affect: Mood normal. Mood is not anxious or depressed. Affect is not angry.         Behavior: Behavior is not agitated.         Thought Content: Thought content normal.         Judgment: Judgment normal.     LAB WORK: Laboratory studies reviewed.    Assessment/Plan   Problem List Items Addressed This Visit             ICD-10-CM       Advance Directives and General Issues    At risk for falls Z91.81       Gastrointestinal and Abdominal    Gastroesophageal reflux disease without esophagitis K21.9       Genitourinary and Reproductive    Benign prostatic hyperplasia without urinary obstruction N40.0       Mental Health    Depression F32.A       Musculoskeletal and Injuries    Osteoarthritis M19.90       Neuro    Intellectual disability F79     Other Visit Diagnoses         Codes    Dementia, unspecified dementia severity, unspecified dementia type, unspecified whether behavioral, psychotic, or mood disturbance or anxiety (Multi)    -  Primary F03.90    Bipolar affective disorder, remission status unspecified (Multi)     F31.9    ASHD (arteriosclerotic heart disease)     I25.10    Hyperlipidemia, unspecified hyperlipidemia type     E78.5    Hypertension, unspecified type     I10    Weakness     R53.1        1. Dementia, unchanged.  2. Depression, on medication.  3. Bipolar disorder, on medication.  4. ASHD, on aspirin.  5. BPH, on tamsulosin.  6. GERD, on PPI.  7. Intellectual disability, on supportive care.  8. Osteoarthritis, on Tylenol.  9. Hyperlipidemia, on statin.  10. Hypertension, medically controlled.  11. Weakness, on PT/OT.  12. Fall risk, fall precautions.    Scribe Attestation  By signing my name below, IElizabeth Scribe attest that this documentation has been prepared under the direction and in the presence of Don Lozada MD.     All medical record entries  made by the scribe were personally dictated by me I have reviewed the chart and agree the record accurately reflects my personal performance of his history physical examination and management

## 2024-10-15 NOTE — PROGRESS NOTES
PLACE OF SERVICE:  [facility], [place]    This is a subsequent visit.    Subjective   Patient ID: Anthony Blair is a 89 y.o. male who presents for Follow-up.    Mr. Anthony Blair is an 89-year-old male with history of Alzheimer's dementia.  He suffers from bipolar disorder as well as intellectual disability.  He is unable to care for himself and manage his affairs. He requires supportive care.    Review of Systems   Constitutional:  Negative for chills and fever.   Cardiovascular:  Negative for chest pain.   All other systems reviewed and are negative.    Objective   /74   Pulse 74   Temp 36.6 °C (97.8 °F)   Resp 16     Physical Exam  Vitals reviewed.   Constitutional:       General: He is not in acute distress.     Comments: This is a well-developed, well-nourished male, sitting in a chair   HENT:      Right Ear: Tympanic membrane, ear canal and external ear normal.      Left Ear: Tympanic membrane, ear canal and external ear normal.   Eyes:      General: No scleral icterus.     Pupils: Pupils are equal, round, and reactive to light.   Neck:      Vascular: No carotid bruit.   Cardiovascular:      Heart sounds: Normal heart sounds, S1 normal and S2 normal. No murmur heard.     No friction rub.   Pulmonary:      Effort: Pulmonary effort is normal.      Breath sounds: Normal breath sounds and air entry.   Abdominal:      Palpations: There is no hepatomegaly, splenomegaly or mass.   Musculoskeletal:         General: No swelling or deformity. Normal range of motion.      Cervical back: Neck supple.      Right lower leg: No edema.      Left lower leg: No edema.   Lymphadenopathy:      Cervical: No cervical adenopathy.      Upper Body:      Right upper body: No axillary adenopathy.      Left upper body: No axillary adenopathy.      Lower Body: No right inguinal adenopathy. No left inguinal adenopathy.   Neurological:      Mental Status: He is alert.      Cranial Nerves: Cranial nerves 2-12 are intact. No cranial  nerve deficit.      Sensory: No sensory deficit.      Motor: Motor function is intact. No weakness.      Gait: Gait is intact.      Deep Tendon Reflexes: Reflexes normal.      Comments: The patient is oriented x2.   Psychiatric:         Mood and Affect: Mood normal. Mood is not anxious or depressed. Affect is not angry.         Behavior: Behavior is not agitated.         Thought Content: Thought content normal.         Judgment: Judgment normal.     LAB WORK: Laboratory studies reviewed.    Assessment/Plan   Problem List Items Addressed This Visit             ICD-10-CM       Advance Directives and General Issues    At risk for falls Z91.81       Gastrointestinal and Abdominal    Gastroesophageal reflux disease without esophagitis K21.9       Genitourinary and Reproductive    Benign prostatic hyperplasia without urinary obstruction N40.0       Mental Health    Depression F32.A       Musculoskeletal and Injuries    Osteoarthritis M19.90       Neuro    Intellectual disability F79     Other Visit Diagnoses         Codes    Dementia, unspecified dementia severity, unspecified dementia type, unspecified whether behavioral, psychotic, or mood disturbance or anxiety (Multi)    -  Primary F03.90    Bipolar affective disorder, remission status unspecified (Multi)     F31.9    Weakness     R53.1    Hyperlipidemia, unspecified hyperlipidemia type     E78.5    Hypertension, unspecified type     I10    Neuropathy     G62.9        1. Dementia, unchanged.  2. Intellectual disability, on supportive care.  3. Bipolar disorder, on medication.  4. Depression, on medication.  5. Weakness, on PT/OT.  6. Fall risk, on fall precautions.  7. BPH, on tamsulosin.  8. Hyperlipidemia, on statin.  9. Hypertension, med controlled.  10. GERD, on PPI.  11. Osteoarthritis, on Tylenol.  12. Neuropathy, on gabapentin.    Scribe Attestation  By signing my name below, IElizabeth Scribe attest that this documentation has been prepared under the  direction and in the presence of Don Lozada MD.     All medical record entries made by the scribe were personally dictated by me I have reviewed the chart and agree the record accurately reflects my personal performance of his history physical examination and management

## 2024-11-03 ENCOUNTER — NURSING HOME VISIT (OUTPATIENT)
Dept: POST ACUTE CARE | Facility: EXTERNAL LOCATION | Age: 89
End: 2024-11-03
Payer: COMMERCIAL

## 2024-11-03 DIAGNOSIS — G62.9 NEUROPATHY: ICD-10-CM

## 2024-11-03 DIAGNOSIS — F32.A DEPRESSION, UNSPECIFIED DEPRESSION TYPE: ICD-10-CM

## 2024-11-03 DIAGNOSIS — F79 INTELLECTUAL DISABILITY: ICD-10-CM

## 2024-11-03 DIAGNOSIS — Z91.81 AT RISK FOR FALLS: ICD-10-CM

## 2024-11-03 DIAGNOSIS — I25.10 ASHD (ARTERIOSCLEROTIC HEART DISEASE): ICD-10-CM

## 2024-11-03 DIAGNOSIS — K21.9 GASTROESOPHAGEAL REFLUX DISEASE WITHOUT ESOPHAGITIS: ICD-10-CM

## 2024-11-03 DIAGNOSIS — M19.90 OSTEOARTHRITIS, UNSPECIFIED OSTEOARTHRITIS TYPE, UNSPECIFIED SITE: ICD-10-CM

## 2024-11-03 DIAGNOSIS — F03.90 DEMENTIA, UNSPECIFIED DEMENTIA SEVERITY, UNSPECIFIED DEMENTIA TYPE, UNSPECIFIED WHETHER BEHAVIORAL, PSYCHOTIC, OR MOOD DISTURBANCE OR ANXIETY (MULTI): Primary | ICD-10-CM

## 2024-11-03 DIAGNOSIS — R53.1 WEAKNESS: ICD-10-CM

## 2024-11-03 DIAGNOSIS — F31.9 BIPOLAR AFFECTIVE DISORDER, REMISSION STATUS UNSPECIFIED (MULTI): ICD-10-CM

## 2024-11-03 DIAGNOSIS — I10 HYPERTENSION, UNSPECIFIED TYPE: ICD-10-CM

## 2024-11-03 DIAGNOSIS — E78.5 HYPERLIPIDEMIA, UNSPECIFIED HYPERLIPIDEMIA TYPE: ICD-10-CM

## 2024-11-03 PROCEDURE — 99309 SBSQ NF CARE MODERATE MDM 30: CPT | Performed by: INTERNAL MEDICINE

## 2024-11-03 NOTE — LETTER
Patient: Anthony Blair  : 1935    Encounter Date: 2024    PLACE OF SERVICE:  [facility]    This is a subsequent visit.    Subjective  Patient ID: Anthony Blair is a 89 y.o. male who presents for Follow-up.    Mr. Anthony Blair is an 89-year-old male with history of dementia with depression.  He suffers from bipolar disorder.  He is unable to care for himself and requires supportive care.    Review of Systems   Constitutional:  Negative for chills and fever.   Cardiovascular:  Negative for chest pain.   All other systems reviewed and are negative.    Objective  /80   Pulse 78   Temp 36.5 °C (97.7 °F)   Resp 16     Physical Exam  Vitals reviewed.   Constitutional:       General: He is not in acute distress.     Comments: This is a well-developed and well-nourished male, sitting in a chair.   HENT:      Right Ear: Tympanic membrane, ear canal and external ear normal.      Left Ear: Tympanic membrane, ear canal and external ear normal.   Eyes:      General: No scleral icterus.     Pupils: Pupils are equal, round, and reactive to light.   Neck:      Vascular: No carotid bruit.   Cardiovascular:      Heart sounds: Normal heart sounds, S1 normal and S2 normal. No murmur heard.     No friction rub.   Pulmonary:      Effort: Pulmonary effort is normal.      Breath sounds: Normal breath sounds and air entry.   Abdominal:      Palpations: There is no hepatomegaly, splenomegaly or mass.   Musculoskeletal:         General: No swelling or deformity. Normal range of motion.      Cervical back: Neck supple.      Right lower leg: No edema.      Left lower leg: No edema.   Lymphadenopathy:      Cervical: No cervical adenopathy.      Upper Body:      Right upper body: No axillary adenopathy.      Left upper body: No axillary adenopathy.      Lower Body: No right inguinal adenopathy. No left inguinal adenopathy.   Neurological:      Mental Status: He is alert.      Cranial Nerves: Cranial nerves 2-12 are  intact. No cranial nerve deficit.      Sensory: No sensory deficit.      Motor: Motor function is intact. No weakness.      Gait: Gait is intact.      Deep Tendon Reflexes: Reflexes normal.      Comments: The patient is oriented x2.   Psychiatric:         Mood and Affect: Mood normal. Mood is not anxious or depressed. Affect is not angry.         Behavior: Behavior is not agitated.         Thought Content: Thought content normal.         Judgment: Judgment normal.     LAB WORK: Laboratory studies were reviewed.    Assessment/Plan  Problem List Items Addressed This Visit             ICD-10-CM       Advance Directives and General Issues    At risk for falls Z91.81       Gastrointestinal and Abdominal    Gastroesophageal reflux disease without esophagitis K21.9       Mental Health    Depression F32.A       Musculoskeletal and Injuries    Osteoarthritis M19.90       Neuro    Intellectual disability F79     Other Visit Diagnoses         Codes    Dementia, unspecified dementia severity, unspecified dementia type, unspecified whether behavioral, psychotic, or mood disturbance or anxiety (Multi)    -  Primary F03.90    Bipolar affective disorder, remission status unspecified (Multi)     F31.9    ASHD (arteriosclerotic heart disease)     I25.10    Neuropathy     G62.9    Hypertension, unspecified type     I10    Hyperlipidemia, unspecified hyperlipidemia type     E78.5    Weakness     R53.1        1. Dementia, unchanged.  2. Depression, on medication.  3. Bipolar disorder, on medication.  4. ASHD, on aspirin.  5. Neuropathy, on gabapentin.  6. Hypertension, med controlled.  7. Hyperlipidemia, on statin.  8. Intellectual disability, on supportive care.  9. Osteoarthritis, on Tylenol.  10. GERD, on PPI.  11. Weakness, on PT, OT.  12. Fall risk, fall precautions.    Scribe Attestation  By signing my name below, IElizabeth Scribe attest that this documentation has been prepared under the direction and in the presence of  Don Lozada MD.     All medical record entries made by the scribe were personally dictated by me I have reviewed the chart and agree the record accurately reflects my personal performance of his history physical examination and management      Electronically Signed By: Don Lozada MD   11/5/24 12:08 AM

## 2024-11-04 VITALS
HEART RATE: 78 BPM | SYSTOLIC BLOOD PRESSURE: 126 MMHG | TEMPERATURE: 97.7 F | DIASTOLIC BLOOD PRESSURE: 80 MMHG | RESPIRATION RATE: 16 BRPM

## 2024-11-04 ASSESSMENT — ENCOUNTER SYMPTOMS
FEVER: 0
CHILLS: 0

## 2024-11-04 NOTE — PROGRESS NOTES
PLACE OF SERVICE:  [facility]    This is a subsequent visit.    Subjective   Patient ID: Anthony Blair is a 89 y.o. male who presents for Follow-up.    Mr. Anthony Blair is an 89-year-old male with history of dementia with depression.  He suffers from bipolar disorder.  He is unable to care for himself and requires supportive care.    Review of Systems   Constitutional:  Negative for chills and fever.   Cardiovascular:  Negative for chest pain.   All other systems reviewed and are negative.    Objective   /80   Pulse 78   Temp 36.5 °C (97.7 °F)   Resp 16     Physical Exam  Vitals reviewed.   Constitutional:       General: He is not in acute distress.     Comments: This is a well-developed and well-nourished male, sitting in a chair.   HENT:      Right Ear: Tympanic membrane, ear canal and external ear normal.      Left Ear: Tympanic membrane, ear canal and external ear normal.   Eyes:      General: No scleral icterus.     Pupils: Pupils are equal, round, and reactive to light.   Neck:      Vascular: No carotid bruit.   Cardiovascular:      Heart sounds: Normal heart sounds, S1 normal and S2 normal. No murmur heard.     No friction rub.   Pulmonary:      Effort: Pulmonary effort is normal.      Breath sounds: Normal breath sounds and air entry.   Abdominal:      Palpations: There is no hepatomegaly, splenomegaly or mass.   Musculoskeletal:         General: No swelling or deformity. Normal range of motion.      Cervical back: Neck supple.      Right lower leg: No edema.      Left lower leg: No edema.   Lymphadenopathy:      Cervical: No cervical adenopathy.      Upper Body:      Right upper body: No axillary adenopathy.      Left upper body: No axillary adenopathy.      Lower Body: No right inguinal adenopathy. No left inguinal adenopathy.   Neurological:      Mental Status: He is alert.      Cranial Nerves: Cranial nerves 2-12 are intact. No cranial nerve deficit.      Sensory: No sensory deficit.       Motor: Motor function is intact. No weakness.      Gait: Gait is intact.      Deep Tendon Reflexes: Reflexes normal.      Comments: The patient is oriented x2.   Psychiatric:         Mood and Affect: Mood normal. Mood is not anxious or depressed. Affect is not angry.         Behavior: Behavior is not agitated.         Thought Content: Thought content normal.         Judgment: Judgment normal.     LAB WORK: Laboratory studies were reviewed.    Assessment/Plan   Problem List Items Addressed This Visit             ICD-10-CM       Advance Directives and General Issues    At risk for falls Z91.81       Gastrointestinal and Abdominal    Gastroesophageal reflux disease without esophagitis K21.9       Mental Health    Depression F32.A       Musculoskeletal and Injuries    Osteoarthritis M19.90       Neuro    Intellectual disability F79     Other Visit Diagnoses         Codes    Dementia, unspecified dementia severity, unspecified dementia type, unspecified whether behavioral, psychotic, or mood disturbance or anxiety (Multi)    -  Primary F03.90    Bipolar affective disorder, remission status unspecified (Multi)     F31.9    ASHD (arteriosclerotic heart disease)     I25.10    Neuropathy     G62.9    Hypertension, unspecified type     I10    Hyperlipidemia, unspecified hyperlipidemia type     E78.5    Weakness     R53.1        1. Dementia, unchanged.  2. Depression, on medication.  3. Bipolar disorder, on medication.  4. ASHD, on aspirin.  5. Neuropathy, on gabapentin.  6. Hypertension, med controlled.  7. Hyperlipidemia, on statin.  8. Intellectual disability, on supportive care.  9. Osteoarthritis, on Tylenol.  10. GERD, on PPI.  11. Weakness, on PT, OT.  12. Fall risk, fall precautions.    Scribe Attestation  By signing my name below, Elizabeth REYES Scribe attest that this documentation has been prepared under the direction and in the presence of Don Lozada MD.     All medical record entries made by the scribe were  personally dictated by me I have reviewed the chart and agree the record accurately reflects my personal performance of his history physical examination and management

## 2024-12-09 ENCOUNTER — NURSING HOME VISIT (OUTPATIENT)
Dept: POST ACUTE CARE | Facility: EXTERNAL LOCATION | Age: 89
End: 2024-12-09
Payer: COMMERCIAL

## 2024-12-09 DIAGNOSIS — I25.10 ASHD (ARTERIOSCLEROTIC HEART DISEASE): ICD-10-CM

## 2024-12-09 DIAGNOSIS — F03.90 DEMENTIA, UNSPECIFIED DEMENTIA SEVERITY, UNSPECIFIED DEMENTIA TYPE, UNSPECIFIED WHETHER BEHAVIORAL, PSYCHOTIC, OR MOOD DISTURBANCE OR ANXIETY (MULTI): Primary | ICD-10-CM

## 2024-12-09 DIAGNOSIS — R53.1 WEAKNESS: ICD-10-CM

## 2024-12-09 DIAGNOSIS — F79 INTELLECTUAL DISABILITY: ICD-10-CM

## 2024-12-09 DIAGNOSIS — G62.9 NEUROPATHY: ICD-10-CM

## 2024-12-09 DIAGNOSIS — N40.0 BENIGN PROSTATIC HYPERPLASIA WITHOUT URINARY OBSTRUCTION: ICD-10-CM

## 2024-12-09 DIAGNOSIS — E78.5 HYPERLIPIDEMIA, UNSPECIFIED HYPERLIPIDEMIA TYPE: ICD-10-CM

## 2024-12-09 DIAGNOSIS — Z91.81 AT RISK FOR FALLS: ICD-10-CM

## 2024-12-09 DIAGNOSIS — F32.A DEPRESSION, UNSPECIFIED DEPRESSION TYPE: ICD-10-CM

## 2024-12-09 DIAGNOSIS — F31.9 BIPOLAR AFFECTIVE DISORDER, REMISSION STATUS UNSPECIFIED (MULTI): ICD-10-CM

## 2024-12-09 DIAGNOSIS — I10 HYPERTENSION, UNSPECIFIED TYPE: ICD-10-CM

## 2024-12-09 DIAGNOSIS — K21.9 GASTROESOPHAGEAL REFLUX DISEASE WITHOUT ESOPHAGITIS: ICD-10-CM

## 2024-12-09 PROCEDURE — 99309 SBSQ NF CARE MODERATE MDM 30: CPT | Performed by: INTERNAL MEDICINE

## 2024-12-09 NOTE — LETTER
Patient: Anthony Blair  : 1935    Encounter Date: 2024    PLACE OF SERVICE:  [facility]    This is a subsequent visit.    Subjective  Patient ID: Anthony Blair is a 89 y.o. male who presents for Follow-up.    Mr. Anthony Blair is an 89-year-old male with history of Alzheimer's dementia.  He is unable to care for himself and manage his affairs.  He requires supportive care.    Review of Systems   Constitutional:  Negative for chills and fever.   Cardiovascular:  Negative for chest pain.   All other systems reviewed and are negative.    Objective  /76   Pulse 76   Temp 36.6 °C (97.8 °F)   Resp 16     Physical Exam  Vitals reviewed.   Constitutional:       General: He is not in acute distress.     Comments: This is a well-developed and well-nourished male, sitting in a chair   HENT:      Right Ear: Tympanic membrane, ear canal and external ear normal.      Left Ear: Tympanic membrane, ear canal and external ear normal.   Eyes:      General: No scleral icterus.     Pupils: Pupils are equal, round, and reactive to light.   Neck:      Vascular: No carotid bruit.   Cardiovascular:      Heart sounds: Normal heart sounds, S1 normal and S2 normal. No murmur heard.     No friction rub.   Pulmonary:      Effort: Pulmonary effort is normal.      Breath sounds: Normal breath sounds and air entry.   Abdominal:      Palpations: There is no hepatomegaly, splenomegaly or mass.   Musculoskeletal:         General: No swelling or deformity. Normal range of motion.      Cervical back: Neck supple.      Right lower leg: No edema.      Left lower leg: No edema.   Lymphadenopathy:      Cervical: No cervical adenopathy.      Upper Body:      Right upper body: No axillary adenopathy.      Left upper body: No axillary adenopathy.      Lower Body: No right inguinal adenopathy. No left inguinal adenopathy.   Neurological:      Mental Status: He is alert and oriented to person, place, and time.      Cranial Nerves:  Cranial nerves 2-12 are intact. No cranial nerve deficit.      Sensory: No sensory deficit.      Motor: Motor function is intact. No weakness.      Gait: Gait is intact.      Deep Tendon Reflexes: Reflexes normal.      Comments: The patient is oriented x2.   Psychiatric:         Mood and Affect: Mood normal. Mood is not anxious or depressed. Affect is not angry.         Behavior: Behavior is not agitated.         Thought Content: Thought content normal.         Judgment: Judgment normal.     LAB WORK: Laboratory studies were reviewed.    Assessment/Plan  Problem List Items Addressed This Visit             ICD-10-CM       Advance Directives and General Issues    At risk for falls Z91.81       Gastrointestinal and Abdominal    Gastroesophageal reflux disease without esophagitis K21.9       Genitourinary and Reproductive    Benign prostatic hyperplasia without urinary obstruction N40.0       Mental Health    Depression F32.A       Neuro    Intellectual disability F79     Other Visit Diagnoses         Codes    Dementia, unspecified dementia severity, unspecified dementia type, unspecified whether behavioral, psychotic, or mood disturbance or anxiety (Multi)    -  Primary F03.90    Neuropathy     G62.9    Hypertension, unspecified type     I10    Bipolar affective disorder, remission status unspecified (Multi)     F31.9    Hyperlipidemia, unspecified hyperlipidemia type     E78.5    ASHD (arteriosclerotic heart disease)     I25.10    Weakness     R53.1        1. Dementia, unchanged.  2. Depression, on medication.  3. BPH, on tamsulosin.  4. Neuropathy, on gabapentin.  5. Hypertension, med controlled.  6. Bipolar disorder, on medications.  7. Hyperlipidemia, on statin.  8. Intellectual disability, on supportive care.  9. GERD, on PPI.  10. ASHD, on aspirin.   11. Weakness, on PT, OT.  12. Fall risk, fall precautions.    Scribe Attestation  By signing my name below, IElizabeth, Scrholly attest that this documentation has  been prepared under the direction and in the presence of Don Lozada MD.     All medical record entries made by the scribe were personally dictated by me I have reviewed the chart and agree the record accurately reflects my personal performance of his history physical examination and management      Electronically Signed By: Don Lozada MD   12/15/24 10:43 PM

## 2024-12-14 VITALS
SYSTOLIC BLOOD PRESSURE: 124 MMHG | RESPIRATION RATE: 16 BRPM | DIASTOLIC BLOOD PRESSURE: 76 MMHG | HEART RATE: 76 BPM | TEMPERATURE: 97.8 F

## 2024-12-14 ASSESSMENT — ENCOUNTER SYMPTOMS
CHILLS: 0
FEVER: 0

## 2024-12-14 NOTE — PROGRESS NOTES
PLACE OF SERVICE:  [facility], [place], operated by [facility]    This is a subsequent visit.    Subjective   Patient ID: Anthony Blair is a 89 y.o. male who presents for Follow-up.    Mr. Anthony Blair is an 89-year-old male with history of Alzheimer's dementia.  He is unable to care for himself and manage his affairs.  He requires supportive care.    Review of Systems   Constitutional:  Negative for chills and fever.   Cardiovascular:  Negative for chest pain.   All other systems reviewed and are negative.    Objective   /76   Pulse 76   Temp 36.6 °C (97.8 °F)   Resp 16     Physical Exam  Vitals reviewed.   Constitutional:       General: He is not in acute distress.     Comments: This is a well-developed and well-nourished male, sitting in a chair   HENT:      Right Ear: Tympanic membrane, ear canal and external ear normal.      Left Ear: Tympanic membrane, ear canal and external ear normal.   Eyes:      General: No scleral icterus.     Pupils: Pupils are equal, round, and reactive to light.   Neck:      Vascular: No carotid bruit.   Cardiovascular:      Heart sounds: Normal heart sounds, S1 normal and S2 normal. No murmur heard.     No friction rub.   Pulmonary:      Effort: Pulmonary effort is normal.      Breath sounds: Normal breath sounds and air entry.   Abdominal:      Palpations: There is no hepatomegaly, splenomegaly or mass.   Musculoskeletal:         General: No swelling or deformity. Normal range of motion.      Cervical back: Neck supple.      Right lower leg: No edema.      Left lower leg: No edema.   Lymphadenopathy:      Cervical: No cervical adenopathy.      Upper Body:      Right upper body: No axillary adenopathy.      Left upper body: No axillary adenopathy.      Lower Body: No right inguinal adenopathy. No left inguinal adenopathy.   Neurological:      Mental Status: He is alert and oriented to person, place, and time.      Cranial Nerves: Cranial nerves 2-12 are intact. No cranial nerve deficit.       Sensory: No sensory deficit.      Motor: Motor function is intact. No weakness.      Gait: Gait is intact.      Deep Tendon Reflexes: Reflexes normal.      Comments: The patient is oriented x2.   Psychiatric:         Mood and Affect: Mood normal. Mood is not anxious or depressed. Affect is not angry.         Behavior: Behavior is not agitated.         Thought Content: Thought content normal.         Judgment: Judgment normal.     LAB WORK: Laboratory studies were reviewed.    Assessment/Plan   Problem List Items Addressed This Visit             ICD-10-CM       Advance Directives and General Issues    At risk for falls Z91.81       Gastrointestinal and Abdominal    Gastroesophageal reflux disease without esophagitis K21.9       Genitourinary and Reproductive    Benign prostatic hyperplasia without urinary obstruction N40.0       Mental Health    Depression F32.A       Neuro    Intellectual disability F79     Other Visit Diagnoses         Codes    Dementia, unspecified dementia severity, unspecified dementia type, unspecified whether behavioral, psychotic, or mood disturbance or anxiety (Multi)    -  Primary F03.90    Neuropathy     G62.9    Hypertension, unspecified type     I10    Bipolar affective disorder, remission status unspecified (Multi)     F31.9    Hyperlipidemia, unspecified hyperlipidemia type     E78.5    ASHD (arteriosclerotic heart disease)     I25.10    Weakness     R53.1        1. Dementia, unchanged.  2. Depression, on medication.  3. BPH, on tamsulosin.  4. Neuropathy, on gabapentin.  5. Hypertension, med controlled.  6. Bipolar disorder, on medications.  7. Hyperlipidemia, on statin.  8. Intellectual disability, on supportive care.  9. GERD, on PPI.  10. ASHD, on aspirin.   11. Weakness, on PT, OT.  12. Fall risk, fall precautions.    Scribe Attestation  By signing my name below, IElizabeth Scribe attest that this documentation has been prepared under the direction and in the presence of  Don Lozada MD.     All medical record entries made by the scribe were personally dictated by me I have reviewed the chart and agree the record accurately reflects my personal performance of his history physical examination and management

## 2025-01-06 ENCOUNTER — NURSING HOME VISIT (OUTPATIENT)
Dept: POST ACUTE CARE | Facility: EXTERNAL LOCATION | Age: 89
End: 2025-01-06
Payer: COMMERCIAL

## 2025-01-06 DIAGNOSIS — K21.9 GASTROESOPHAGEAL REFLUX DISEASE WITHOUT ESOPHAGITIS: ICD-10-CM

## 2025-01-06 DIAGNOSIS — I10 HYPERTENSION, UNSPECIFIED TYPE: ICD-10-CM

## 2025-01-06 DIAGNOSIS — F32.A DEPRESSION, UNSPECIFIED DEPRESSION TYPE: ICD-10-CM

## 2025-01-06 DIAGNOSIS — I49.5 SICK SINUS SYNDROME (MULTI): ICD-10-CM

## 2025-01-06 DIAGNOSIS — N18.30 STAGE 3 CHRONIC KIDNEY DISEASE, UNSPECIFIED WHETHER STAGE 3A OR 3B CKD (MULTI): ICD-10-CM

## 2025-01-06 DIAGNOSIS — E78.5 HYPERLIPIDEMIA, UNSPECIFIED HYPERLIPIDEMIA TYPE: ICD-10-CM

## 2025-01-06 DIAGNOSIS — F79 INTELLECTUAL DISABILITY: ICD-10-CM

## 2025-01-06 DIAGNOSIS — G47.00 INSOMNIA, UNSPECIFIED TYPE: ICD-10-CM

## 2025-01-06 DIAGNOSIS — F25.9 SCHIZOAFFECTIVE DISORDER, UNSPECIFIED TYPE: ICD-10-CM

## 2025-01-06 DIAGNOSIS — N40.0 BENIGN PROSTATIC HYPERPLASIA WITHOUT URINARY OBSTRUCTION: ICD-10-CM

## 2025-01-06 DIAGNOSIS — I25.10 ASHD (ARTERIOSCLEROTIC HEART DISEASE): ICD-10-CM

## 2025-01-06 DIAGNOSIS — F31.9 BIPOLAR AFFECTIVE DISORDER, REMISSION STATUS UNSPECIFIED (MULTI): ICD-10-CM

## 2025-01-06 DIAGNOSIS — M19.90 OSTEOARTHRITIS, UNSPECIFIED OSTEOARTHRITIS TYPE, UNSPECIFIED SITE: ICD-10-CM

## 2025-01-06 DIAGNOSIS — G62.9 NEUROPATHY: ICD-10-CM

## 2025-01-06 DIAGNOSIS — S06.5X0A: ICD-10-CM

## 2025-01-06 DIAGNOSIS — F03.90 DEMENTIA, UNSPECIFIED DEMENTIA SEVERITY, UNSPECIFIED DEMENTIA TYPE, UNSPECIFIED WHETHER BEHAVIORAL, PSYCHOTIC, OR MOOD DISTURBANCE OR ANXIETY (MULTI): Primary | ICD-10-CM

## 2025-01-06 PROCEDURE — 99309 SBSQ NF CARE MODERATE MDM 30: CPT | Performed by: INTERNAL MEDICINE

## 2025-01-06 NOTE — Clinical Note
Patient: Anthony Blair  : 1935    Encounter Date: 2025    PLACE OF SERVICE:  [facility]    This is a subsequent visit.    Subjective  Patient ID: Anthony Blair is a 89 y.o. male who presents for Follow-up.    Mr. Anthony Blair is an 89-year-old male with history of dementia with depression.  He is unable to care for himself and manage his affairs.  He requires supportive care.  He is DNR-CCA.    Review of Systems   Constitutional:  Negative for chills and fever.   Cardiovascular:  Negative for chest pain.   All other systems reviewed and are negative.    Objective  /76   Pulse 74   Temp 36.4 °C (97.6 °F)   Resp 16     Physical Exam  Vitals reviewed.   Constitutional:       General: He is not in acute distress.     Comments: This is a well-developed and elderly male, sitting in a chair.   HENT:      Right Ear: Tympanic membrane, ear canal and external ear normal.      Left Ear: Tympanic membrane, ear canal and external ear normal.   Eyes:      General: No scleral icterus.     Pupils: Pupils are equal, round, and reactive to light.   Neck:      Vascular: No carotid bruit.   Cardiovascular:      Heart sounds: Normal heart sounds, S1 normal and S2 normal. No murmur heard.     No friction rub.   Pulmonary:      Effort: Pulmonary effort is normal.      Breath sounds: Normal breath sounds and air entry.   Abdominal:      Palpations: There is no hepatomegaly, splenomegaly or mass.   Musculoskeletal:         General: No swelling or deformity. Normal range of motion.      Cervical back: Neck supple.      Right lower leg: No edema.      Left lower leg: No edema.   Lymphadenopathy:      Cervical: No cervical adenopathy.      Upper Body:      Right upper body: No axillary adenopathy.      Left upper body: No axillary adenopathy.      Lower Body: No right inguinal adenopathy. No left inguinal adenopathy.   Neurological:      Mental Status: He is alert.      Cranial Nerves: Cranial nerves 2-12 are  intact. No cranial nerve deficit.      Sensory: No sensory deficit.      Motor: Motor function is intact. No weakness.      Gait: Gait is intact.      Deep Tendon Reflexes: Reflexes normal.      Comments: The patient is oriented x2.   Psychiatric:         Mood and Affect: Mood normal. Mood is not anxious or depressed. Affect is not angry.         Behavior: Behavior is not agitated.         Thought Content: Thought content normal.         Judgment: Judgment normal.     LAB WORK:  Laboratory studies were reviewed.    Assessment/Plan  Problem List Items Addressed This Visit             ICD-10-CM       Gastrointestinal and Abdominal    Gastroesophageal reflux disease without esophagitis K21.9       Genitourinary and Reproductive    Benign prostatic hyperplasia without urinary obstruction N40.0       Mental Health    Depression F32.A       Musculoskeletal and Injuries    Osteoarthritis M19.90       Neuro    Intellectual disability F79     Other Visit Diagnoses         Codes    Dementia, unspecified dementia severity, unspecified dementia type, unspecified whether behavioral, psychotic, or mood disturbance or anxiety (Multi)    -  Primary F03.90    Hypertension, unspecified type     I10    Bipolar affective disorder, remission status unspecified (Multi)     F31.9    Neuropathy     G62.9    ASHD (arteriosclerotic heart disease)     I25.10    Hyperlipidemia, unspecified hyperlipidemia type     E78.5    Insomnia, unspecified type     G47.00        1. Dementia, unchanged.  2. Depression, on medication.  3. BPH, on tamulosin.  4. Intellectual disability, on supportive care.  5. Hypertension, medically controlled.  6. Bipolar disorder, on medication.  7. Neuropathy, on gabapentin.  8. ASHD, on aspirin.  9. GERD, on PPI.  10. Osteoarthritis, on Tylenol.  11. Hyperlipidemia, on statin.  12. Insomnia, on melatonin.    Scribe Attestation  By signing my name below, IElizabeth, Scribe attest that this documentation has been  prepared under the direction and in the presence of Don Lozada MD.       Electronically Signed By: Don Lozada MD   1/7/25 11:08 AM

## 2025-01-07 VITALS
DIASTOLIC BLOOD PRESSURE: 76 MMHG | SYSTOLIC BLOOD PRESSURE: 124 MMHG | TEMPERATURE: 97.6 F | RESPIRATION RATE: 16 BRPM | HEART RATE: 74 BPM

## 2025-01-07 ASSESSMENT — ENCOUNTER SYMPTOMS
FEVER: 0
CHILLS: 0

## 2025-01-07 NOTE — PROGRESS NOTES
PLACE OF SERVICE:  [facility]    This is a subsequent visit.    Subjective   Patient ID: Anthony Blair is a 89 y.o. male who presents for Follow-up.    Mr. Anthony Blair is an 89-year-old male with history of dementia with depression.  He is unable to care for himself and manage his affairs.  He requires supportive care.  He is DNR-CCA.    Review of Systems   Constitutional:  Negative for chills and fever.   Cardiovascular:  Negative for chest pain.   All other systems reviewed and are negative.    Objective   /76   Pulse 74   Temp 36.4 °C (97.6 °F)   Resp 16     Physical Exam  Vitals reviewed.   Constitutional:       General: He is not in acute distress.     Comments: This is a well-developed and elderly male, sitting in a chair.   HENT:      Right Ear: Tympanic membrane, ear canal and external ear normal.      Left Ear: Tympanic membrane, ear canal and external ear normal.   Eyes:      General: No scleral icterus.     Pupils: Pupils are equal, round, and reactive to light.   Neck:      Vascular: No carotid bruit.   Cardiovascular:      Heart sounds: Normal heart sounds, S1 normal and S2 normal. No murmur heard.     No friction rub.   Pulmonary:      Effort: Pulmonary effort is normal.      Breath sounds: Normal breath sounds and air entry.   Abdominal:      Palpations: There is no hepatomegaly, splenomegaly or mass.   Musculoskeletal:         General: No swelling or deformity. Normal range of motion.      Cervical back: Neck supple.      Right lower leg: No edema.      Left lower leg: No edema.   Lymphadenopathy:      Cervical: No cervical adenopathy.      Upper Body:      Right upper body: No axillary adenopathy.      Left upper body: No axillary adenopathy.      Lower Body: No right inguinal adenopathy. No left inguinal adenopathy.   Neurological:      Mental Status: He is alert.      Cranial Nerves: Cranial nerves 2-12 are intact. No cranial nerve deficit.      Sensory: No sensory deficit.       Motor: Motor function is intact. No weakness.      Gait: Gait is intact.      Deep Tendon Reflexes: Reflexes normal.      Comments: The patient is oriented x2.   Psychiatric:         Mood and Affect: Mood normal. Mood is not anxious or depressed. Affect is not angry.         Behavior: Behavior is not agitated.         Thought Content: Thought content normal.         Judgment: Judgment normal.     LAB WORK:  Laboratory studies were reviewed.    Assessment/Plan   Problem List Items Addressed This Visit             ICD-10-CM       Gastrointestinal and Abdominal    Gastroesophageal reflux disease without esophagitis K21.9       Genitourinary and Reproductive    Benign prostatic hyperplasia without urinary obstruction N40.0       Mental Health    Depression F32.A       Musculoskeletal and Injuries    Osteoarthritis M19.90       Neuro    Intellectual disability F79     Other Visit Diagnoses         Codes    Dementia, unspecified dementia severity, unspecified dementia type, unspecified whether behavioral, psychotic, or mood disturbance or anxiety (Multi)    -  Primary F03.90    Hypertension, unspecified type     I10    Bipolar affective disorder, remission status unspecified (Multi)     F31.9    Neuropathy     G62.9    ASHD (arteriosclerotic heart disease)     I25.10    Hyperlipidemia, unspecified hyperlipidemia type     E78.5    Insomnia, unspecified type     G47.00        1. Dementia, unchanged.  2. Depression, on medication.  3. BPH, on tamulosin.  4. Intellectual disability, on supportive care.  5. Hypertension, medically controlled.  6. Bipolar disorder, on medication.  7. Neuropathy, on gabapentin.  8. ASHD, on aspirin.  9. GERD, on PPI.  10. Osteoarthritis, on Tylenol.  11. Hyperlipidemia, on statin.  12. Insomnia, on melatonin.    Scribe Attestation  By signing my name below, IElizabeth Scribe attest that this documentation has been prepared under the direction and in the presence of Don Lozada MD.      All medical record entries made by the scribe were personally dictated by me I have reviewed the chart and agree the record accurately reflects my personal performance of his history physical examination and management

## 2025-02-09 ENCOUNTER — NURSING HOME VISIT (OUTPATIENT)
Dept: POST ACUTE CARE | Facility: EXTERNAL LOCATION | Age: 89
End: 2025-02-09
Payer: COMMERCIAL

## 2025-02-09 DIAGNOSIS — F79 INTELLECTUAL DISABILITY: ICD-10-CM

## 2025-02-09 DIAGNOSIS — I25.10 ASHD (ARTERIOSCLEROTIC HEART DISEASE): ICD-10-CM

## 2025-02-09 DIAGNOSIS — F32.A DEPRESSION, UNSPECIFIED DEPRESSION TYPE: ICD-10-CM

## 2025-02-09 DIAGNOSIS — I10 HYPERTENSION, UNSPECIFIED TYPE: ICD-10-CM

## 2025-02-09 DIAGNOSIS — E78.5 HYPERLIPIDEMIA, UNSPECIFIED HYPERLIPIDEMIA TYPE: ICD-10-CM

## 2025-02-09 DIAGNOSIS — F03.90 DEMENTIA, UNSPECIFIED DEMENTIA SEVERITY, UNSPECIFIED DEMENTIA TYPE, UNSPECIFIED WHETHER BEHAVIORAL, PSYCHOTIC, OR MOOD DISTURBANCE OR ANXIETY (MULTI): Primary | ICD-10-CM

## 2025-02-09 DIAGNOSIS — K21.9 GASTROESOPHAGEAL REFLUX DISEASE WITHOUT ESOPHAGITIS: ICD-10-CM

## 2025-02-09 DIAGNOSIS — F31.9 BIPOLAR AFFECTIVE DISORDER, REMISSION STATUS UNSPECIFIED (MULTI): ICD-10-CM

## 2025-02-09 DIAGNOSIS — N40.0 BENIGN PROSTATIC HYPERPLASIA WITHOUT URINARY OBSTRUCTION: ICD-10-CM

## 2025-02-09 DIAGNOSIS — G62.9 NEUROPATHY: ICD-10-CM

## 2025-02-09 PROCEDURE — 99308 SBSQ NF CARE LOW MDM 20: CPT | Performed by: INTERNAL MEDICINE

## 2025-02-09 NOTE — LETTER
Patient: Anthony Blair  : 1935    Encounter Date: 2025    PLACE OF SERVICE:  [facility]    This is a subsequent visit.    Subjective  Patient ID: Anthony Blair is a 89 y.o. male who presents for Follow-up.    Mr. Anthony Blair is an 89-year-old male with history of Alzheimer's dementia.  He has a history of bipolar disorder.  He is unable to care for himself.  He requires supportive care.    Review of Systems   Constitutional:  Negative for chills and fever.   Cardiovascular:  Negative for chest pain.   All other systems reviewed and are negative.    Objective  /80   Pulse 76   Temp 36.6 °C (97.8 °F)   Resp 16     Physical Exam  Vitals reviewed.   Constitutional:       General: He is not in acute distress.     Comments: This is a well-developed, well-nourished male, sitting in a chair.   HENT:      Right Ear: Tympanic membrane, ear canal and external ear normal.      Left Ear: Tympanic membrane, ear canal and external ear normal.   Eyes:      General: No scleral icterus.     Pupils: Pupils are equal, round, and reactive to light.   Neck:      Vascular: No carotid bruit.   Cardiovascular:      Heart sounds: Normal heart sounds, S1 normal and S2 normal. No murmur heard.     No friction rub.   Pulmonary:      Effort: Pulmonary effort is normal.      Breath sounds: Normal breath sounds and air entry.   Abdominal:      Palpations: There is no hepatomegaly, splenomegaly or mass.   Musculoskeletal:         General: No swelling or deformity. Normal range of motion.      Cervical back: Neck supple.      Right lower leg: No edema.      Left lower leg: No edema.   Lymphadenopathy:      Cervical: No cervical adenopathy.      Upper Body:      Right upper body: No axillary adenopathy.      Left upper body: No axillary adenopathy.      Lower Body: No right inguinal adenopathy. No left inguinal adenopathy.   Neurological:      Mental Status: He is alert.      Cranial Nerves: Cranial nerves 2-12 are  intact. No cranial nerve deficit.      Sensory: No sensory deficit.      Motor: Motor function is intact. No weakness.      Gait: Gait is intact.      Deep Tendon Reflexes: Reflexes normal.      Comments: The patient is oriented x2.   Psychiatric:         Mood and Affect: Mood normal. Mood is not anxious or depressed. Affect is not angry.         Behavior: Behavior is not agitated.         Thought Content: Thought content normal.         Judgment: Judgment normal.     LAB WORK:  Laboratory studies were reviewed.    Assessment/Plan  Problem List Items Addressed This Visit             ICD-10-CM       Gastrointestinal and Abdominal    Gastroesophageal reflux disease without esophagitis K21.9       Genitourinary and Reproductive    Benign prostatic hyperplasia without urinary obstruction N40.0       Mental Health    Depression F32.A       Neuro    Intellectual disability F79     Other Visit Diagnoses         Codes    Dementia, unspecified dementia severity, unspecified dementia type, unspecified whether behavioral, psychotic, or mood disturbance or anxiety (Multi)    -  Primary F03.90    Bipolar affective disorder, remission status unspecified (Multi)     F31.9    ASHD (arteriosclerotic heart disease)     I25.10    Neuropathy     G62.9    Hypertension, unspecified type     I10    Hyperlipidemia, unspecified hyperlipidemia type     E78.5        1. Dementia, unchanged.  2. Depression, on medication.  3. Bipolar disorder, on medication.  4. ASHD, on aspirin.  5. Neuropathy, on gabapentin.  6. Hypertension, medically controlled.  7. Hyperlipidemia, on statin.  8. Intellectual disability, on supportive care.  9. BPH, on tamulosin.  10. GERD, on PPI.    Scribe Attestation  By signing my name below, IElizabeth Scribe attest that this documentation has been prepared under the direction and in the presence of Don Lozada MD.     All medical record entries made by the scribe were personally dictated by me I have reviewed  the chart and agree the record accurately reflects my personal performance of his history physical examination and management      Electronically Signed By: Don Lozada MD   2/14/25 12:42 AM

## 2025-02-13 VITALS
RESPIRATION RATE: 16 BRPM | HEART RATE: 76 BPM | DIASTOLIC BLOOD PRESSURE: 80 MMHG | TEMPERATURE: 97.8 F | SYSTOLIC BLOOD PRESSURE: 124 MMHG

## 2025-02-13 ASSESSMENT — ENCOUNTER SYMPTOMS
FEVER: 0
CHILLS: 0

## 2025-02-13 NOTE — PROGRESS NOTES
PLACE OF SERVICE:  [facility]    This is a subsequent visit.    Subjective   Patient ID: Anthony Blair is a 89 y.o. male who presents for Follow-up.    Mr. Anthony Blair is an 89-year-old male with history of Alzheimer's dementia.  He has a history of bipolar disorder.  He is unable to care for himself.  He requires supportive care.    Review of Systems   Constitutional:  Negative for chills and fever.   Cardiovascular:  Negative for chest pain.   All other systems reviewed and are negative.    Objective   /80   Pulse 76   Temp 36.6 °C (97.8 °F)   Resp 16     Physical Exam  Vitals reviewed.   Constitutional:       General: He is not in acute distress.     Comments: This is a well-developed, well-nourished male, sitting in a chair.   HENT:      Right Ear: Tympanic membrane, ear canal and external ear normal.      Left Ear: Tympanic membrane, ear canal and external ear normal.   Eyes:      General: No scleral icterus.     Pupils: Pupils are equal, round, and reactive to light.   Neck:      Vascular: No carotid bruit.   Cardiovascular:      Heart sounds: Normal heart sounds, S1 normal and S2 normal. No murmur heard.     No friction rub.   Pulmonary:      Effort: Pulmonary effort is normal.      Breath sounds: Normal breath sounds and air entry.   Abdominal:      Palpations: There is no hepatomegaly, splenomegaly or mass.   Musculoskeletal:         General: No swelling or deformity. Normal range of motion.      Cervical back: Neck supple.      Right lower leg: No edema.      Left lower leg: No edema.   Lymphadenopathy:      Cervical: No cervical adenopathy.      Upper Body:      Right upper body: No axillary adenopathy.      Left upper body: No axillary adenopathy.      Lower Body: No right inguinal adenopathy. No left inguinal adenopathy.   Neurological:      Mental Status: He is alert.      Cranial Nerves: Cranial nerves 2-12 are intact. No cranial nerve deficit.      Sensory: No sensory deficit.       Motor: Motor function is intact. No weakness.      Gait: Gait is intact.      Deep Tendon Reflexes: Reflexes normal.      Comments: The patient is oriented x2.   Psychiatric:         Mood and Affect: Mood normal. Mood is not anxious or depressed. Affect is not angry.         Behavior: Behavior is not agitated.         Thought Content: Thought content normal.         Judgment: Judgment normal.     LAB WORK:  Laboratory studies were reviewed.    Assessment/Plan   Problem List Items Addressed This Visit             ICD-10-CM       Gastrointestinal and Abdominal    Gastroesophageal reflux disease without esophagitis K21.9       Genitourinary and Reproductive    Benign prostatic hyperplasia without urinary obstruction N40.0       Mental Health    Depression F32.A       Neuro    Intellectual disability F79     Other Visit Diagnoses         Codes    Dementia, unspecified dementia severity, unspecified dementia type, unspecified whether behavioral, psychotic, or mood disturbance or anxiety (Multi)    -  Primary F03.90    Bipolar affective disorder, remission status unspecified (Multi)     F31.9    ASHD (arteriosclerotic heart disease)     I25.10    Neuropathy     G62.9    Hypertension, unspecified type     I10    Hyperlipidemia, unspecified hyperlipidemia type     E78.5        1. Dementia, unchanged.  2. Depression, on medication.  3. Bipolar disorder, on medication.  4. ASHD, on aspirin.  5. Neuropathy, on gabapentin.  6. Hypertension, medically controlled.  7. Hyperlipidemia, on statin.  8. Intellectual disability, on supportive care.  9. BPH, on tamulosin.  10. GERD, on PPI.    Scribe Attestation  By signing my name below, IElizabeth Scribe attest that this documentation has been prepared under the direction and in the presence of Don Lozada MD.     All medical record entries made by the scribe were personally dictated by me I have reviewed the chart and agree the record accurately reflects my personal  performance of his history physical examination and management

## 2025-03-01 ENCOUNTER — NURSING HOME VISIT (OUTPATIENT)
Dept: POST ACUTE CARE | Facility: EXTERNAL LOCATION | Age: 89
End: 2025-03-01
Payer: COMMERCIAL

## 2025-03-01 DIAGNOSIS — I10 HYPERTENSION, UNSPECIFIED TYPE: ICD-10-CM

## 2025-03-01 DIAGNOSIS — F03.90 DEMENTIA, UNSPECIFIED DEMENTIA SEVERITY, UNSPECIFIED DEMENTIA TYPE, UNSPECIFIED WHETHER BEHAVIORAL, PSYCHOTIC, OR MOOD DISTURBANCE OR ANXIETY (MULTI): ICD-10-CM

## 2025-03-01 DIAGNOSIS — F79 INTELLECTUAL DISABILITY: Primary | ICD-10-CM

## 2025-03-01 DIAGNOSIS — F31.9 BIPOLAR AFFECTIVE DISORDER, REMISSION STATUS UNSPECIFIED (MULTI): ICD-10-CM

## 2025-03-01 DIAGNOSIS — E78.5 HYPERLIPIDEMIA, UNSPECIFIED HYPERLIPIDEMIA TYPE: ICD-10-CM

## 2025-03-01 DIAGNOSIS — N40.0 BENIGN PROSTATIC HYPERPLASIA WITHOUT URINARY OBSTRUCTION: ICD-10-CM

## 2025-03-01 DIAGNOSIS — I25.10 ASHD (ARTERIOSCLEROTIC HEART DISEASE): ICD-10-CM

## 2025-03-01 DIAGNOSIS — K21.9 GASTROESOPHAGEAL REFLUX DISEASE WITHOUT ESOPHAGITIS: ICD-10-CM

## 2025-03-01 DIAGNOSIS — G62.9 NEUROPATHY: ICD-10-CM

## 2025-03-01 DIAGNOSIS — F32.A DEPRESSION, UNSPECIFIED DEPRESSION TYPE: ICD-10-CM

## 2025-03-01 PROCEDURE — 99309 SBSQ NF CARE MODERATE MDM 30: CPT | Performed by: INTERNAL MEDICINE

## 2025-03-01 NOTE — LETTER
Patient: Anthony Blair  : 1935    Encounter Date: 2025    PLACE OF SERVICE:  [facility].    This is a subsequent visit.    Subjective  Patient ID: Anthony Blair is a 89 y.o. male who presents for Follow-up.    Mr. Anthony Blair is an 89-year-old male with history of bipolar disorder.  He suffers from dementia and depression.  He is unable to care for himself and requires supportive care.    Review of Systems   Constitutional:  Negative for chills and fever.   Cardiovascular:  Negative for chest pain.   All other systems reviewed and are negative.    Objective  /78   Pulse 76   Temp 36.5 °C (97.7 °F)   Resp 16     Physical Exam  Vitals reviewed.   Constitutional:       General: He is not in acute distress.     Comments: This is a well-developed, well-nourished male, sitting in a chair.   HENT:      Right Ear: Tympanic membrane, ear canal and external ear normal.      Left Ear: Tympanic membrane, ear canal and external ear normal.   Eyes:      General: No scleral icterus.     Pupils: Pupils are equal, round, and reactive to light.   Neck:      Vascular: No carotid bruit.   Cardiovascular:      Heart sounds: Normal heart sounds, S1 normal and S2 normal. No murmur heard.     No friction rub.   Pulmonary:      Effort: Pulmonary effort is normal.      Breath sounds: Normal breath sounds and air entry.   Abdominal:      Palpations: There is no hepatomegaly, splenomegaly or mass.   Musculoskeletal:         General: No swelling or deformity. Normal range of motion.      Cervical back: Neck supple.      Right lower leg: No edema.      Left lower leg: No edema.   Lymphadenopathy:      Cervical: No cervical adenopathy.      Upper Body:      Right upper body: No axillary adenopathy.      Left upper body: No axillary adenopathy.      Lower Body: No right inguinal adenopathy. No left inguinal adenopathy.   Neurological:      Mental Status: He is alert.      Cranial Nerves: Cranial nerves 2-12 are  intact. No cranial nerve deficit.      Sensory: No sensory deficit.      Motor: Motor function is intact. No weakness.      Gait: Gait is intact.      Deep Tendon Reflexes: Reflexes normal.      Comments: The patient is oriented x2.   Psychiatric:         Mood and Affect: Mood normal. Mood is not anxious or depressed. Affect is not angry.         Behavior: Behavior is not agitated.         Thought Content: Thought content normal.         Judgment: Judgment normal.     LAB WORK: Laboratory studies reviewed.    Assessment/Plan  Problem List Items Addressed This Visit             ICD-10-CM       Gastrointestinal and Abdominal    Gastroesophageal reflux disease without esophagitis K21.9       Genitourinary and Reproductive    Benign prostatic hyperplasia without urinary obstruction N40.0       Mental Health    Depression F32.A       Neuro    Intellectual disability - Primary F79     Other Visit Diagnoses         Codes    Bipolar affective disorder, remission status unspecified (Multi)     F31.9    Dementia, unspecified dementia severity, unspecified dementia type, unspecified whether behavioral, psychotic, or mood disturbance or anxiety (Multi)     F03.90    Hypertension, unspecified type     I10    Neuropathy     G62.9    ASHD (arteriosclerotic heart disease)     I25.10    Hyperlipidemia, unspecified hyperlipidemia type     E78.5        1. Intellectual disability, on supportive care.  2. Bipolar disorder, on medication.  3. Depression, on medication.  4. Dementia, unchanged.  5. Hypertension, med controlled.  6. Neuropathy, on gabapentin.  7. ASHD, on aspirin.  8. BPH, on tamsulosin.  9. GERD, on PPI.  10. Hyperlipidemia, on statin.    Scribe Attestation  By signing my name below, IAntonette Scribe attest that this documentation has been prepared under the direction and in the presence of Don Lozada MD.     All medical record entries made by the scribe were personally dictated by me I have reviewed the chart  and agree the record accurately reflects my personal performance of his history physical examination and management      Electronically Signed By: Don Lozada MD   3/3/25 11:25 PM

## 2025-03-03 VITALS
RESPIRATION RATE: 16 BRPM | HEART RATE: 76 BPM | DIASTOLIC BLOOD PRESSURE: 78 MMHG | SYSTOLIC BLOOD PRESSURE: 124 MMHG | TEMPERATURE: 97.7 F

## 2025-03-03 ASSESSMENT — ENCOUNTER SYMPTOMS
CHILLS: 0
FEVER: 0

## 2025-03-03 NOTE — PROGRESS NOTES
PLACE OF SERVICE:  [facility].    This is a subsequent visit.    Subjective   Patient ID: Anthony Blair is a 89 y.o. male who presents for Follow-up.    Mr. Anthony Blair is an 89-year-old male with history of bipolar disorder.  He suffers from dementia and depression.  He is unable to care for himself and requires supportive care.    Review of Systems   Constitutional:  Negative for chills and fever.   Cardiovascular:  Negative for chest pain.   All other systems reviewed and are negative.    Objective   /78   Pulse 76   Temp 36.5 °C (97.7 °F)   Resp 16     Physical Exam  Vitals reviewed.   Constitutional:       General: He is not in acute distress.     Comments: This is a well-developed, well-nourished male, sitting in a chair.   HENT:      Right Ear: Tympanic membrane, ear canal and external ear normal.      Left Ear: Tympanic membrane, ear canal and external ear normal.   Eyes:      General: No scleral icterus.     Pupils: Pupils are equal, round, and reactive to light.   Neck:      Vascular: No carotid bruit.   Cardiovascular:      Heart sounds: Normal heart sounds, S1 normal and S2 normal. No murmur heard.     No friction rub.   Pulmonary:      Effort: Pulmonary effort is normal.      Breath sounds: Normal breath sounds and air entry.   Abdominal:      Palpations: There is no hepatomegaly, splenomegaly or mass.   Musculoskeletal:         General: No swelling or deformity. Normal range of motion.      Cervical back: Neck supple.      Right lower leg: No edema.      Left lower leg: No edema.   Lymphadenopathy:      Cervical: No cervical adenopathy.      Upper Body:      Right upper body: No axillary adenopathy.      Left upper body: No axillary adenopathy.      Lower Body: No right inguinal adenopathy. No left inguinal adenopathy.   Neurological:      Mental Status: He is alert.      Cranial Nerves: Cranial nerves 2-12 are intact. No cranial nerve deficit.      Sensory: No sensory deficit.       Motor: Motor function is intact. No weakness.      Gait: Gait is intact.      Deep Tendon Reflexes: Reflexes normal.      Comments: The patient is oriented x2.   Psychiatric:         Mood and Affect: Mood normal. Mood is not anxious or depressed. Affect is not angry.         Behavior: Behavior is not agitated.         Thought Content: Thought content normal.         Judgment: Judgment normal.     LAB WORK: Laboratory studies reviewed.    Assessment/Plan   Problem List Items Addressed This Visit             ICD-10-CM       Gastrointestinal and Abdominal    Gastroesophageal reflux disease without esophagitis K21.9       Genitourinary and Reproductive    Benign prostatic hyperplasia without urinary obstruction N40.0       Mental Health    Depression F32.A       Neuro    Intellectual disability - Primary F79     Other Visit Diagnoses         Codes    Bipolar affective disorder, remission status unspecified (Multi)     F31.9    Dementia, unspecified dementia severity, unspecified dementia type, unspecified whether behavioral, psychotic, or mood disturbance or anxiety (Multi)     F03.90    Hypertension, unspecified type     I10    Neuropathy     G62.9    ASHD (arteriosclerotic heart disease)     I25.10    Hyperlipidemia, unspecified hyperlipidemia type     E78.5        1. Intellectual disability, on supportive care.  2. Bipolar disorder, on medication.  3. Depression, on medication.  4. Dementia, unchanged.  5. Hypertension, med controlled.  6. Neuropathy, on gabapentin.  7. ASHD, on aspirin.  8. BPH, on tamsulosin.  9. GERD, on PPI.  10. Hyperlipidemia, on statin.    Scribe Attestation  By signing my name below, IAntonette Scribe attest that this documentation has been prepared under the direction and in the presence of Don Lozada MD.     All medical record entries made by the [email or web address] were personally dictated by me I have reviewed the chart and agree the record accurately reflects my personal performance of his  history physical examination and management

## 2025-03-08 ENCOUNTER — NURSING HOME VISIT (OUTPATIENT)
Dept: POST ACUTE CARE | Facility: EXTERNAL LOCATION | Age: 89
End: 2025-03-08
Payer: COMMERCIAL

## 2025-03-08 DIAGNOSIS — I10 HYPERTENSION, UNSPECIFIED TYPE: ICD-10-CM

## 2025-03-08 DIAGNOSIS — I25.10 ASHD (ARTERIOSCLEROTIC HEART DISEASE): ICD-10-CM

## 2025-03-08 DIAGNOSIS — G47.00 INSOMNIA, UNSPECIFIED TYPE: ICD-10-CM

## 2025-03-08 DIAGNOSIS — M19.90 OSTEOARTHRITIS, UNSPECIFIED OSTEOARTHRITIS TYPE, UNSPECIFIED SITE: ICD-10-CM

## 2025-03-08 DIAGNOSIS — G62.9 NEUROPATHY: ICD-10-CM

## 2025-03-08 DIAGNOSIS — E78.5 HYPERLIPIDEMIA, UNSPECIFIED HYPERLIPIDEMIA TYPE: ICD-10-CM

## 2025-03-08 DIAGNOSIS — F32.A DEPRESSION, UNSPECIFIED DEPRESSION TYPE: ICD-10-CM

## 2025-03-08 DIAGNOSIS — F03.90 DEMENTIA, UNSPECIFIED DEMENTIA SEVERITY, UNSPECIFIED DEMENTIA TYPE, UNSPECIFIED WHETHER BEHAVIORAL, PSYCHOTIC, OR MOOD DISTURBANCE OR ANXIETY (MULTI): Primary | ICD-10-CM

## 2025-03-08 DIAGNOSIS — F79 INTELLECTUAL DISABILITY: ICD-10-CM

## 2025-03-08 DIAGNOSIS — F31.9 BIPOLAR AFFECTIVE DISORDER, REMISSION STATUS UNSPECIFIED (MULTI): ICD-10-CM

## 2025-03-08 DIAGNOSIS — K21.9 GASTROESOPHAGEAL REFLUX DISEASE WITHOUT ESOPHAGITIS: ICD-10-CM

## 2025-03-08 DIAGNOSIS — N40.0 BENIGN PROSTATIC HYPERPLASIA WITHOUT URINARY OBSTRUCTION: ICD-10-CM

## 2025-03-08 PROCEDURE — 99309 SBSQ NF CARE MODERATE MDM 30: CPT | Performed by: INTERNAL MEDICINE

## 2025-03-08 NOTE — LETTER
Patient: Anthony Blair  : 1935    Encounter Date: 2025    PLACE OF SERVICE:  [facility]    This is a subsequent visit.    Subjective  Patient ID: Anthony Blair is a 89 y.o. male who presents for Follow-up.    Mr. Anthony Blair is an 89-year-old male with history of Alzheimer's dementia.  He suffers from bipolar disorder and depression.  He is unable to care for himself and requires supportive care.    Review of Systems   Constitutional:  Negative for chills and fever.   Cardiovascular:  Negative for chest pain.   All other systems reviewed and are negative.    Objective  /76   Pulse 76   Temp 36.6 °C (97.8 °F)   Resp 16     Physical Exam  Vitals reviewed.   Constitutional:       Comments: This is a well-developed, well-nourished male, sitting in a chair.   HENT:      Right Ear: Tympanic membrane, ear canal and external ear normal.      Left Ear: Tympanic membrane, ear canal and external ear normal.   Eyes:      General: No scleral icterus.     Pupils: Pupils are equal, round, and reactive to light.   Neck:      Vascular: No carotid bruit.   Cardiovascular:      Heart sounds: Normal heart sounds, S1 normal and S2 normal. No murmur heard.     No friction rub.   Pulmonary:      Effort: Pulmonary effort is normal.      Breath sounds: Normal breath sounds and air entry.   Abdominal:      Palpations: There is no hepatomegaly, splenomegaly or mass.   Musculoskeletal:         General: No swelling or deformity. Normal range of motion.      Cervical back: Neck supple.      Right lower leg: No edema.      Left lower leg: No edema.   Lymphadenopathy:      Cervical: No cervical adenopathy.      Upper Body:      Right upper body: No axillary adenopathy.      Left upper body: No axillary adenopathy.      Lower Body: No right inguinal adenopathy. No left inguinal adenopathy.   Neurological:      Mental Status: He is alert.      Cranial Nerves: Cranial nerves 2-12 are intact. No cranial nerve deficit.       Sensory: No sensory deficit.      Motor: Motor function is intact. No weakness.      Gait: Gait is intact.      Deep Tendon Reflexes: Reflexes normal.      Comments: The patient is oriented x2.   Psychiatric:         Mood and Affect: Mood normal. Mood is not anxious or depressed. Affect is not angry.         Behavior: Behavior is not agitated.         Thought Content: Thought content normal.         Judgment: Judgment normal.     LAB WORK: Laboratory studies were reviewed.    Assessment/Plan  Problem List Items Addressed This Visit             ICD-10-CM    Intellectual disability F79    Depression F32.A    Gastroesophageal reflux disease without esophagitis K21.9    Osteoarthritis M19.90    Benign prostatic hyperplasia without urinary obstruction N40.0     Other Visit Diagnoses         Codes    Dementia, unspecified dementia severity, unspecified dementia type, unspecified whether behavioral, psychotic, or mood disturbance or anxiety (Multi)    -  Primary F03.90    Bipolar affective disorder, remission status unspecified (Multi)     F31.9    Hypertension, unspecified type     I10    ASHD (arteriosclerotic heart disease)     I25.10    Hyperlipidemia, unspecified hyperlipidemia type     E78.5    Neuropathy     G62.9    Insomnia, unspecified type     G47.00        1. Dementia, unchanged.  2. Depression, on medication.  3. Intellectual disability, on supportive care.  4. Bipolar disorder, on medication.  5. Hypertension, med controlled.  6. ASHD, on aspirin.  7. BPH, on tamsulosin.  8. GERD on PPI.  9. Osteoarthritis, on Tylenol.  10. Hyperlipidemia, on statin.  11. Neuropathy, on gabapentin.  12. Insomnia, on melatonin.    Scribe Attestation  By signing my name below, IElizabeth Scribe attest that this documentation has been prepared under the direction and in the presence of Don Lozada MD.     All medical record entries made by the scribe were personally dictated by me I have reviewed the chart and agree the  record accurately reflects my personal performance of his history physical examination and management      Electronically Signed By: Don Lozada MD   3/12/25  1:08 AM

## 2025-03-10 VITALS
SYSTOLIC BLOOD PRESSURE: 126 MMHG | RESPIRATION RATE: 16 BRPM | HEART RATE: 76 BPM | TEMPERATURE: 97.8 F | DIASTOLIC BLOOD PRESSURE: 76 MMHG

## 2025-03-10 ASSESSMENT — ENCOUNTER SYMPTOMS
CHILLS: 0
FEVER: 0

## 2025-03-10 NOTE — PROGRESS NOTES
PLACE OF SERVICE:  [facility]    This is a subsequent visit.    Subjective   Patient ID: Anthony Blair is a 89 y.o. male who presents for Follow-up.    Mr. Anthony Blair is an 89-year-old male with history of Alzheimer's dementia.  He suffers from bipolar disorder and depression.  He is unable to care for himself and requires supportive care.    Review of Systems   Constitutional:  Negative for chills and fever.   Cardiovascular:  Negative for chest pain.   All other systems reviewed and are negative.    Objective   /76   Pulse 76   Temp 36.6 °C (97.8 °F)   Resp 16     Physical Exam  Vitals reviewed.   Constitutional:       Comments: This is a well-developed, well-nourished male, sitting in a chair.   HENT:      Right Ear: Tympanic membrane, ear canal and external ear normal.      Left Ear: Tympanic membrane, ear canal and external ear normal.   Eyes:      General: No scleral icterus.     Pupils: Pupils are equal, round, and reactive to light.   Neck:      Vascular: No carotid bruit.   Cardiovascular:      Heart sounds: Normal heart sounds, S1 normal and S2 normal. No murmur heard.     No friction rub.   Pulmonary:      Effort: Pulmonary effort is normal.      Breath sounds: Normal breath sounds and air entry.   Abdominal:      Palpations: There is no hepatomegaly, splenomegaly or mass.   Musculoskeletal:         General: No swelling or deformity. Normal range of motion.      Cervical back: Neck supple.      Right lower leg: No edema.      Left lower leg: No edema.   Lymphadenopathy:      Cervical: No cervical adenopathy.      Upper Body:      Right upper body: No axillary adenopathy.      Left upper body: No axillary adenopathy.      Lower Body: No right inguinal adenopathy. No left inguinal adenopathy.   Neurological:      Mental Status: He is alert.      Cranial Nerves: Cranial nerves 2-12 are intact. No cranial nerve deficit.      Sensory: No sensory deficit.      Motor: Motor function is  intact. No weakness.      Gait: Gait is intact.      Deep Tendon Reflexes: Reflexes normal.      Comments: The patient is oriented x2.   Psychiatric:         Mood and Affect: Mood normal. Mood is not anxious or depressed. Affect is not angry.         Behavior: Behavior is not agitated.         Thought Content: Thought content normal.         Judgment: Judgment normal.     LAB WORK: Laboratory studies were reviewed.    Assessment/Plan   Problem List Items Addressed This Visit             ICD-10-CM    Intellectual disability F79    Depression F32.A    Gastroesophageal reflux disease without esophagitis K21.9    Osteoarthritis M19.90    Benign prostatic hyperplasia without urinary obstruction N40.0     Other Visit Diagnoses         Codes    Dementia, unspecified dementia severity, unspecified dementia type, unspecified whether behavioral, psychotic, or mood disturbance or anxiety (Multi)    -  Primary F03.90    Bipolar affective disorder, remission status unspecified (Multi)     F31.9    Hypertension, unspecified type     I10    ASHD (arteriosclerotic heart disease)     I25.10    Hyperlipidemia, unspecified hyperlipidemia type     E78.5    Neuropathy     G62.9    Insomnia, unspecified type     G47.00        1. Dementia, unchanged.  2. Depression, on medication.  3. Intellectual disability, on supportive care.  4. Bipolar disorder, on medication.  5. Hypertension, med controlled.  6. ASHD, on aspirin.  7. BPH, on tamsulosin.  8. GERD on PPI.  9. Osteoarthritis, on Tylenol.  10. Hyperlipidemia, on statin.  11. Neuropathy, on gabapentin.  12. Insomnia, on melatonin.    Scribe Attestation  By signing my name below, IElizabeth Scribe attest that this documentation has been prepared under the direction and in the presence of Don Lozada MD.     All medical record entries made by the scribe were personally dictated by me I have reviewed the chart and agree the record accurately reflects my personal performance of his  history physical examination and management

## 2025-03-15 ENCOUNTER — NURSING HOME VISIT (OUTPATIENT)
Dept: POST ACUTE CARE | Facility: EXTERNAL LOCATION | Age: 89
End: 2025-03-15
Payer: COMMERCIAL

## 2025-03-15 DIAGNOSIS — E78.5 HYPERLIPIDEMIA, UNSPECIFIED HYPERLIPIDEMIA TYPE: ICD-10-CM

## 2025-03-15 DIAGNOSIS — G62.9 NEUROPATHY: ICD-10-CM

## 2025-03-15 DIAGNOSIS — F32.A DEPRESSION, UNSPECIFIED DEPRESSION TYPE: ICD-10-CM

## 2025-03-15 DIAGNOSIS — I10 HYPERTENSION, UNSPECIFIED TYPE: ICD-10-CM

## 2025-03-15 DIAGNOSIS — F03.90 DEMENTIA, UNSPECIFIED DEMENTIA SEVERITY, UNSPECIFIED DEMENTIA TYPE, UNSPECIFIED WHETHER BEHAVIORAL, PSYCHOTIC, OR MOOD DISTURBANCE OR ANXIETY (MULTI): Primary | ICD-10-CM

## 2025-03-15 DIAGNOSIS — F79 INTELLECTUAL DISABILITY: ICD-10-CM

## 2025-03-15 DIAGNOSIS — N40.0 BENIGN PROSTATIC HYPERPLASIA WITHOUT URINARY OBSTRUCTION: ICD-10-CM

## 2025-03-15 DIAGNOSIS — I25.10 ASHD (ARTERIOSCLEROTIC HEART DISEASE): ICD-10-CM

## 2025-03-15 DIAGNOSIS — G47.00 INSOMNIA, UNSPECIFIED TYPE: ICD-10-CM

## 2025-03-15 DIAGNOSIS — F31.9 BIPOLAR AFFECTIVE DISORDER, REMISSION STATUS UNSPECIFIED (MULTI): ICD-10-CM

## 2025-03-15 DIAGNOSIS — M19.90 OSTEOARTHRITIS, UNSPECIFIED OSTEOARTHRITIS TYPE, UNSPECIFIED SITE: ICD-10-CM

## 2025-03-15 DIAGNOSIS — K21.9 GASTROESOPHAGEAL REFLUX DISEASE WITHOUT ESOPHAGITIS: ICD-10-CM

## 2025-03-15 PROCEDURE — 99309 SBSQ NF CARE MODERATE MDM 30: CPT | Performed by: INTERNAL MEDICINE

## 2025-03-15 NOTE — LETTER
Patient: Anthony Blair  : 1935    Encounter Date: 03/15/2025    PLACE OF SERVICE:  [facility].    This is a subsequent visit.    Subjective  Patient ID: Anthony Blair is a 89 y.o. male who presents for Follow-up.    Mr. Anthony Blair is an 89-year-old male with history of dementia with bipolar disorder and depression.  He is unable to care for himself and requires supportive care.    Review of Systems   Constitutional:  Negative for chills and fever.   Cardiovascular:  Negative for chest pain.   All other systems reviewed and are negative.    Objective  /80   Pulse 76   Temp 36.5 °C (97.7 °F)   Resp 16     Physical Exam  Vitals reviewed.   Constitutional:       General: He is not in acute distress.     Comments: This is a well-developed, well-nourished male, sitting in a chair.   HENT:      Right Ear: Tympanic membrane, ear canal and external ear normal.      Left Ear: Tympanic membrane, ear canal and external ear normal.   Eyes:      General: No scleral icterus.     Pupils: Pupils are equal, round, and reactive to light.   Neck:      Vascular: No carotid bruit.   Cardiovascular:      Heart sounds: Normal heart sounds, S1 normal and S2 normal. No murmur heard.     No friction rub.   Pulmonary:      Effort: Pulmonary effort is normal.      Breath sounds: Normal breath sounds and air entry.   Abdominal:      Palpations: There is no hepatomegaly, splenomegaly or mass.   Musculoskeletal:         General: No swelling or deformity. Normal range of motion.      Cervical back: Neck supple.      Right lower leg: No edema.      Left lower leg: No edema.   Lymphadenopathy:      Cervical: No cervical adenopathy.      Upper Body:      Right upper body: No axillary adenopathy.      Left upper body: No axillary adenopathy.      Lower Body: No right inguinal adenopathy. No left inguinal adenopathy.   Neurological:      Mental Status: He is alert.      Cranial Nerves: Cranial nerves 2-12 are intact. No cranial  nerve deficit.      Sensory: No sensory deficit.      Motor: Motor function is intact. No weakness.      Gait: Gait is intact.      Deep Tendon Reflexes: Reflexes normal.      Comments: The patient is oriented x2.   Psychiatric:         Mood and Affect: Mood normal. Mood is not anxious or depressed. Affect is not angry.         Behavior: Behavior is not agitated.         Thought Content: Thought content normal.         Judgment: Judgment normal.     LAB WORK: Laboratory studies reviewed.    Assessment/Plan  Problem List Items Addressed This Visit             ICD-10-CM       Gastrointestinal and Abdominal    Gastroesophageal reflux disease without esophagitis K21.9       Genitourinary and Reproductive    Benign prostatic hyperplasia without urinary obstruction N40.0       Mental Health    Depression F32.A       Musculoskeletal and Injuries    Osteoarthritis M19.90       Neuro    Intellectual disability F79     Other Visit Diagnoses         Codes    Dementia, unspecified dementia severity, unspecified dementia type, unspecified whether behavioral, psychotic, or mood disturbance or anxiety (Multi)    -  Primary F03.90    Bipolar affective disorder, remission status unspecified (Multi)     F31.9    Hypertension, unspecified type     I10    ASHD (arteriosclerotic heart disease)     I25.10    Hyperlipidemia, unspecified hyperlipidemia type     E78.5    Neuropathy     G62.9    Insomnia, unspecified type     G47.00        1. Dementia, unchanged.  2. Bipolar disorder, on medication.  3. Depression, on medication.  4. Hypertension, med controlled.  5. Intellectual disability, on supportive care.  6. GERD, on PPI.  7. BPH, on tamsulosin.  8. ASHD, on aspirin.  9. Hyperlipidemia, on statin.  10. Neuropathy, on gabapentin.  11. Osteoarthritis, on Tylenol.  12. Insomnia, on melatonin.    Scribe Attestation  By signing my name below, IAntonette, Xander attest that this documentation has been prepared under the direction and in  the presence of Don Lozada MD.     All medical record entries made by the scribe were personally dictated by me I have reviewed the chart and agree the record accurately reflects my personal performance of his history physical examination and management      Electronically Signed By: Don Lozada MD   3/19/25 12:40 AM

## 2025-03-17 VITALS
DIASTOLIC BLOOD PRESSURE: 80 MMHG | HEART RATE: 76 BPM | TEMPERATURE: 97.7 F | SYSTOLIC BLOOD PRESSURE: 126 MMHG | RESPIRATION RATE: 16 BRPM

## 2025-03-17 ASSESSMENT — ENCOUNTER SYMPTOMS
FEVER: 0
CHILLS: 0

## 2025-03-17 NOTE — PROGRESS NOTES
PLACE OF SERVICE:  [facility], [place], operated by [facility].    This is a subsequent visit.    Subjective   Patient ID: Anthony Blair is a 89 y.o. male who presents for Follow-up.    Mr. Anthony Blair is an 89-year-old male with history of dementia with bipolar disorder and depression.  He is unable to care for himself and requires supportive care.    Review of Systems   Constitutional:  Negative for chills and fever.   Cardiovascular:  Negative for chest pain.   All other systems reviewed and are negative.    Objective   /80   Pulse 76   Temp 36.5 °C (97.7 °F)   Resp 16     Physical Exam  Vitals reviewed.   Constitutional:       General: He is not in acute distress.     Comments: This is a well-developed, well-nourished male, sitting in a chair.   HENT:      Right Ear: Tympanic membrane, ear canal and external ear normal.      Left Ear: Tympanic membrane, ear canal and external ear normal.   Eyes:      General: No scleral icterus.     Pupils: Pupils are equal, round, and reactive to light.   Neck:      Vascular: No carotid bruit.   Cardiovascular:      Heart sounds: Normal heart sounds, S1 normal and S2 normal. No murmur heard.     No friction rub.   Pulmonary:      Effort: Pulmonary effort is normal.      Breath sounds: Normal breath sounds and air entry.   Abdominal:      Palpations: There is no hepatomegaly, splenomegaly or mass.   Musculoskeletal:         General: No swelling or deformity. Normal range of motion.      Cervical back: Neck supple.      Right lower leg: No edema.      Left lower leg: No edema.   Lymphadenopathy:      Cervical: No cervical adenopathy.      Upper Body:      Right upper body: No axillary adenopathy.      Left upper body: No axillary adenopathy.      Lower Body: No right inguinal adenopathy. No left inguinal adenopathy.   Neurological:      Mental Status: He is alert.      Cranial Nerves: Cranial nerves 2-12 are intact. No cranial nerve deficit.      Sensory: No sensory deficit.      Motor: Motor  function is intact. No weakness.      Gait: Gait is intact.      Deep Tendon Reflexes: Reflexes normal.      Comments: The patient is oriented x2.   Psychiatric:         Mood and Affect: Mood normal. Mood is not anxious or depressed. Affect is not angry.         Behavior: Behavior is not agitated.         Thought Content: Thought content normal.         Judgment: Judgment normal.     LAB WORK: Laboratory studies reviewed.    Assessment/Plan   Problem List Items Addressed This Visit             ICD-10-CM       Gastrointestinal and Abdominal    Gastroesophageal reflux disease without esophagitis K21.9       Genitourinary and Reproductive    Benign prostatic hyperplasia without urinary obstruction N40.0       Mental Health    Depression F32.A       Musculoskeletal and Injuries    Osteoarthritis M19.90       Neuro    Intellectual disability F79     Other Visit Diagnoses         Codes    Dementia, unspecified dementia severity, unspecified dementia type, unspecified whether behavioral, psychotic, or mood disturbance or anxiety (Multi)    -  Primary F03.90    Bipolar affective disorder, remission status unspecified (Multi)     F31.9    Hypertension, unspecified type     I10    ASHD (arteriosclerotic heart disease)     I25.10    Hyperlipidemia, unspecified hyperlipidemia type     E78.5    Neuropathy     G62.9    Insomnia, unspecified type     G47.00        1. Dementia, unchanged.  2. Bipolar disorder, on medication.  3. Depression, on medication.  4. Hypertension, med controlled.  5. Intellectual disability, on supportive care.  6. GERD, on PPI.  7. BPH, on tamsulosin.  8. ASHD, on aspirin.  9. Hyperlipidemia, on statin.  10. Neuropathy, on gabapentin.  11. Osteoarthritis, on Tylenol.  12. Insomnia, on melatonin.    Scribe Attestation  By signing my name below, IAntonette Scribe attest that this documentation has been prepared under the direction and in the presence of Don Lozada MD.     All medical record entries  made by the scribe were personally dictated by me I have reviewed the chart and agree the record accurately reflects my personal performance of his history physical examination and management

## 2025-03-23 ENCOUNTER — NURSING HOME VISIT (OUTPATIENT)
Dept: POST ACUTE CARE | Facility: EXTERNAL LOCATION | Age: 89
End: 2025-03-23
Payer: COMMERCIAL

## 2025-03-23 DIAGNOSIS — I25.10 ASHD (ARTERIOSCLEROTIC HEART DISEASE): ICD-10-CM

## 2025-03-23 DIAGNOSIS — G47.00 INSOMNIA, UNSPECIFIED TYPE: ICD-10-CM

## 2025-03-23 DIAGNOSIS — F03.90 DEMENTIA, UNSPECIFIED DEMENTIA SEVERITY, UNSPECIFIED DEMENTIA TYPE, UNSPECIFIED WHETHER BEHAVIORAL, PSYCHOTIC, OR MOOD DISTURBANCE OR ANXIETY (MULTI): ICD-10-CM

## 2025-03-23 DIAGNOSIS — F31.9 BIPOLAR AFFECTIVE DISORDER, REMISSION STATUS UNSPECIFIED (MULTI): ICD-10-CM

## 2025-03-23 DIAGNOSIS — K21.9 GASTROESOPHAGEAL REFLUX DISEASE WITHOUT ESOPHAGITIS: ICD-10-CM

## 2025-03-23 DIAGNOSIS — M19.90 OSTEOARTHRITIS, UNSPECIFIED OSTEOARTHRITIS TYPE, UNSPECIFIED SITE: ICD-10-CM

## 2025-03-23 DIAGNOSIS — F32.A DEPRESSION, UNSPECIFIED DEPRESSION TYPE: ICD-10-CM

## 2025-03-23 DIAGNOSIS — F79 INTELLECTUAL DISABILITY: Primary | ICD-10-CM

## 2025-03-23 DIAGNOSIS — I10 HYPERTENSION, UNSPECIFIED TYPE: ICD-10-CM

## 2025-03-23 DIAGNOSIS — E78.5 HYPERLIPIDEMIA, UNSPECIFIED HYPERLIPIDEMIA TYPE: ICD-10-CM

## 2025-03-23 DIAGNOSIS — G62.9 NEUROPATHY: ICD-10-CM

## 2025-03-23 DIAGNOSIS — N40.0 BENIGN PROSTATIC HYPERPLASIA WITHOUT URINARY OBSTRUCTION: ICD-10-CM

## 2025-03-23 PROCEDURE — 99309 SBSQ NF CARE MODERATE MDM 30: CPT | Performed by: INTERNAL MEDICINE

## 2025-03-23 NOTE — LETTER
Patient: Anthony Blair  : 1935    Encounter Date: 2025    PLACE OF SERVICE:  [facility].    This is a subsequent visit.    Subjective  Patient ID: Anthony Blair is a 89 y.o. male who presents for Follow-up.    Mr. Anthony Blair is an 89-year-old male with history of intellectual disability with depression and bipolar disorder.  He suffers from dementia.  He is unable to care for himself.  He requires supportive care.    Review of Systems   Constitutional:  Negative for chills and fever.   Cardiovascular:  Negative for chest pain.   All other systems reviewed and are negative.    Objective  /78   Pulse 76   Temp 36.6 °C (97.9 °F)   Resp 16     Physical Exam  Vitals reviewed.   Constitutional:       General: He is not in acute distress.     Comments: This is a well-developed, well-nourished male, sitting in a chair.   HENT:      Right Ear: Tympanic membrane, ear canal and external ear normal.      Left Ear: Tympanic membrane, ear canal and external ear normal.   Eyes:      General: No scleral icterus.     Pupils: Pupils are equal, round, and reactive to light.   Neck:      Vascular: No carotid bruit.   Cardiovascular:      Heart sounds: Normal heart sounds, S1 normal and S2 normal. No murmur heard.     No friction rub.   Pulmonary:      Effort: Pulmonary effort is normal.      Breath sounds: Normal breath sounds and air entry.   Abdominal:      Palpations: There is no hepatomegaly, splenomegaly or mass.   Musculoskeletal:         General: No swelling or deformity. Normal range of motion.      Cervical back: Neck supple.      Right lower leg: No edema.      Left lower leg: No edema.   Lymphadenopathy:      Cervical: No cervical adenopathy.      Upper Body:      Right upper body: No axillary adenopathy.      Left upper body: No axillary adenopathy.      Lower Body: No right inguinal adenopathy. No left inguinal adenopathy.   Neurological:      Mental Status: He is alert.      Cranial Nerves:  Cranial nerves 2-12 are intact. No cranial nerve deficit.      Sensory: No sensory deficit.      Motor: Motor function is intact. No weakness.      Gait: Gait is intact.      Deep Tendon Reflexes: Reflexes normal.      Comments: The patient is oriented x2.   Psychiatric:         Mood and Affect: Mood normal. Mood is not anxious or depressed. Affect is not angry.         Behavior: Behavior is not agitated.         Thought Content: Thought content normal.         Judgment: Judgment normal.     LAB WORK:  Laboratory studies were reviewed.    Assessment/Plan  Problem List Items Addressed This Visit             ICD-10-CM       Gastrointestinal and Abdominal    Gastroesophageal reflux disease without esophagitis K21.9       Genitourinary and Reproductive    Benign prostatic hyperplasia without urinary obstruction N40.0       Mental Health    Depression F32.A       Musculoskeletal and Injuries    Osteoarthritis M19.90       Neuro    Intellectual disability - Primary F79     Other Visit Diagnoses         Codes    Bipolar affective disorder, remission status unspecified (Multi)     F31.9    Dementia, unspecified dementia severity, unspecified dementia type, unspecified whether behavioral, psychotic, or mood disturbance or anxiety (Multi)     F03.90    Hypertension, unspecified type     I10    Hyperlipidemia, unspecified hyperlipidemia type     E78.5    ASHD (arteriosclerotic heart disease)     I25.10    Insomnia, unspecified type     G47.00    Neuropathy     G62.9        1. Intellectual disability, on supportive care.  2. Depression, on medication.  3. Bipolar disorder, on medication.  4. Dementia, unchanged.  5. Hypertension, medically controlled.  6. BPH, on tamsulosin.  7. GERD, on PPI.  8. Hyperlipidemia on statin.  9. ASHD, on aspirin.  10. Insomnia, on melatonin.  11. Osteoarthritis, on Tylenol.  12. Neuropathy, on gabapentin.    Scribe Attestation  By signing my name below, IAntonette, Scribe attest that this  documentation has been prepared under the direction and in the presence of Don Lozada MD.     All medical record entries made by the scribe were personally dictated by me I have reviewed the chart and agree the record accurately reflects my personal performance of his history physical examination and management      Electronically Signed By: Don Lozada MD   3/30/25 11:18 PM

## 2025-03-24 VITALS
DIASTOLIC BLOOD PRESSURE: 78 MMHG | SYSTOLIC BLOOD PRESSURE: 124 MMHG | RESPIRATION RATE: 16 BRPM | HEART RATE: 76 BPM | TEMPERATURE: 97.9 F

## 2025-03-24 ASSESSMENT — ENCOUNTER SYMPTOMS
FEVER: 0
CHILLS: 0

## 2025-03-24 NOTE — PROGRESS NOTES
PLACE OF SERVICE:  [facility].    This is a subsequent visit.    Subjective   Patient ID: Anthony Blair is a 89 y.o. male who presents for Follow-up.    Mr. Anthony Blair is an 89-year-old male with history of intellectual disability with depression and bipolar disorder.  He suffers from dementia.  He is unable to care for himself.  He requires supportive care.    Review of Systems   Constitutional:  Negative for chills and fever.   Cardiovascular:  Negative for chest pain.   All other systems reviewed and are negative.    Objective   /78   Pulse 76   Temp 36.6 °C (97.9 °F)   Resp 16     Physical Exam  Vitals reviewed.   Constitutional:       General: He is not in acute distress.     Comments: This is a well-developed, well-nourished male, sitting in a chair.   HENT:      Right Ear: Tympanic membrane, ear canal and external ear normal.      Left Ear: Tympanic membrane, ear canal and external ear normal.   Eyes:      General: No scleral icterus.     Pupils: Pupils are equal, round, and reactive to light.   Neck:      Vascular: No carotid bruit.   Cardiovascular:      Heart sounds: Normal heart sounds, S1 normal and S2 normal. No murmur heard.     No friction rub.   Pulmonary:      Effort: Pulmonary effort is normal.      Breath sounds: Normal breath sounds and air entry.   Abdominal:      Palpations: There is no hepatomegaly, splenomegaly or mass.   Musculoskeletal:         General: No swelling or deformity. Normal range of motion.      Cervical back: Neck supple.      Right lower leg: No edema.      Left lower leg: No edema.   Lymphadenopathy:      Cervical: No cervical adenopathy.      Upper Body:      Right upper body: No axillary adenopathy.      Left upper body: No axillary adenopathy.      Lower Body: No right inguinal adenopathy. No left inguinal adenopathy.   Neurological:      Mental Status: He is alert.      Cranial Nerves: Cranial nerves 2-12 are intact. No cranial nerve deficit.       Sensory: No sensory deficit.      Motor: Motor function is intact. No weakness.      Gait: Gait is intact.      Deep Tendon Reflexes: Reflexes normal.      Comments: The patient is oriented x2.   Psychiatric:         Mood and Affect: Mood normal. Mood is not anxious or depressed. Affect is not angry.         Behavior: Behavior is not agitated.         Thought Content: Thought content normal.         Judgment: Judgment normal.     LAB WORK:  Laboratory studies were reviewed.    Assessment/Plan   Problem List Items Addressed This Visit             ICD-10-CM       Gastrointestinal and Abdominal    Gastroesophageal reflux disease without esophagitis K21.9       Genitourinary and Reproductive    Benign prostatic hyperplasia without urinary obstruction N40.0       Mental Health    Depression F32.A       Musculoskeletal and Injuries    Osteoarthritis M19.90       Neuro    Intellectual disability - Primary F79     Other Visit Diagnoses         Codes    Bipolar affective disorder, remission status unspecified (Multi)     F31.9    Dementia, unspecified dementia severity, unspecified dementia type, unspecified whether behavioral, psychotic, or mood disturbance or anxiety (Multi)     F03.90    Hypertension, unspecified type     I10    Hyperlipidemia, unspecified hyperlipidemia type     E78.5    ASHD (arteriosclerotic heart disease)     I25.10    Insomnia, unspecified type     G47.00    Neuropathy     G62.9        1. Intellectual disability, on supportive care.  2. Depression, on medication.  3. Bipolar disorder, on medication.  4. Dementia, unchanged.  5. Hypertension, medically controlled.  6. BPH, on tamsulosin.  7. GERD, on PPI.  8. Hyperlipidemia on statin.  9. ASHD, on aspirin.  10. Insomnia, on melatonin.  11. Osteoarthritis, on Tylenol.  12. Neuropathy, on gabapentin.    Scribe Attestation  By signing my name below, IAntonette Scribe attest that this documentation has been prepared under the direction and in the  presence of Don Lozada MD.     All medical record entries made by the scribe were personally dictated by me I have reviewed the chart and agree the record accurately reflects my personal performance of his history physical examination and management

## 2025-03-29 ENCOUNTER — NURSING HOME VISIT (OUTPATIENT)
Dept: POST ACUTE CARE | Facility: EXTERNAL LOCATION | Age: 89
End: 2025-03-29
Payer: COMMERCIAL

## 2025-03-29 DIAGNOSIS — N40.0 BENIGN PROSTATIC HYPERPLASIA WITHOUT URINARY OBSTRUCTION: ICD-10-CM

## 2025-03-29 DIAGNOSIS — Z91.81 AT RISK FOR FALLS: ICD-10-CM

## 2025-03-29 DIAGNOSIS — G62.9 NEUROPATHY: ICD-10-CM

## 2025-03-29 DIAGNOSIS — I25.10 ASHD (ARTERIOSCLEROTIC HEART DISEASE): ICD-10-CM

## 2025-03-29 DIAGNOSIS — I10 HYPERTENSION, UNSPECIFIED TYPE: ICD-10-CM

## 2025-03-29 DIAGNOSIS — F03.90 DEMENTIA, UNSPECIFIED DEMENTIA SEVERITY, UNSPECIFIED DEMENTIA TYPE, UNSPECIFIED WHETHER BEHAVIORAL, PSYCHOTIC, OR MOOD DISTURBANCE OR ANXIETY (MULTI): Primary | ICD-10-CM

## 2025-03-29 DIAGNOSIS — M19.90 OSTEOARTHRITIS, UNSPECIFIED OSTEOARTHRITIS TYPE, UNSPECIFIED SITE: ICD-10-CM

## 2025-03-29 DIAGNOSIS — E78.5 HYPERLIPIDEMIA, UNSPECIFIED HYPERLIPIDEMIA TYPE: ICD-10-CM

## 2025-03-29 DIAGNOSIS — F32.A DEPRESSION, UNSPECIFIED DEPRESSION TYPE: ICD-10-CM

## 2025-03-29 DIAGNOSIS — F79 INTELLECTUAL DISABILITY: ICD-10-CM

## 2025-03-29 DIAGNOSIS — K21.9 GASTROESOPHAGEAL REFLUX DISEASE WITHOUT ESOPHAGITIS: ICD-10-CM

## 2025-03-29 DIAGNOSIS — R53.1 WEAKNESS: ICD-10-CM

## 2025-03-29 PROCEDURE — 99309 SBSQ NF CARE MODERATE MDM 30: CPT | Performed by: INTERNAL MEDICINE

## 2025-03-29 NOTE — LETTER
Patient: Anthony Blair  : 1935    Encounter Date: 2025    PLACE OF SERVICE:  [facility]    This is a subsequent visit.    Subjective  Patient ID: Anthony Blair is a 89 y.o. male who presents for Follow-up.    Mr. Annetta Blair is an 89-year-old male with history of Alzheimer's dementia with bipolar disorder and depression.  He is unable to care for himself and manage his affairs.  He requires supportive care.    Review of Systems   Constitutional:  Negative for chills and fever.   Cardiovascular:  Negative for chest pain.   All other systems reviewed and are negative.    Objective  /78   Pulse 76   Temp 36.6 °C (97.9 °F)   Resp 16     Physical Exam  Vitals reviewed.   Constitutional:       General: He is not in acute distress.     Comments: This is a well-developed, well-nourished male, sitting in a chair.   HENT:      Right Ear: Tympanic membrane, ear canal and external ear normal.      Left Ear: Tympanic membrane, ear canal and external ear normal.   Eyes:      General: No scleral icterus.     Pupils: Pupils are equal, round, and reactive to light.   Neck:      Vascular: No carotid bruit.   Cardiovascular:      Heart sounds: Normal heart sounds, S1 normal and S2 normal. No murmur heard.     No friction rub.   Pulmonary:      Effort: Pulmonary effort is normal.      Breath sounds: Normal breath sounds and air entry.   Abdominal:      Palpations: There is no hepatomegaly, splenomegaly or mass.   Musculoskeletal:         General: No swelling or deformity. Normal range of motion.      Cervical back: Neck supple.      Right lower leg: No edema.      Left lower leg: No edema.   Lymphadenopathy:      Cervical: No cervical adenopathy.      Upper Body:      Right upper body: No axillary adenopathy.      Left upper body: No axillary adenopathy.      Lower Body: No right inguinal adenopathy. No left inguinal adenopathy.   Neurological:      Mental Status: He is alert.      Cranial Nerves: Cranial  nerves 2-12 are intact. No cranial nerve deficit.      Sensory: No sensory deficit.      Motor: Motor function is intact. No weakness.      Gait: Gait is intact.      Deep Tendon Reflexes: Reflexes normal.      Comments: The patient is oriented x2.   Psychiatric:         Mood and Affect: Mood normal. Mood is not anxious or depressed. Affect is not angry.         Behavior: Behavior is not agitated.         Thought Content: Thought content normal.         Judgment: Judgment normal.     LAB WORK:  Laboratory studies were reviewed.    Assessment/Plan  Problem List Items Addressed This Visit             ICD-10-CM    Intellectual disability F79    At risk for falls Z91.81    Depression F32.A    Gastroesophageal reflux disease without esophagitis K21.9    Osteoarthritis M19.90    Benign prostatic hyperplasia without urinary obstruction N40.0     Other Visit Diagnoses         Codes    Dementia, unspecified dementia severity, unspecified dementia type, unspecified whether behavioral, psychotic, or mood disturbance or anxiety (Multi)    -  Primary F03.90    Hypertension, unspecified type     I10    Hyperlipidemia, unspecified hyperlipidemia type     E78.5    ASHD (arteriosclerotic heart disease)     I25.10    Neuropathy     G62.9    Weakness     R53.1        1. Dementia, unchanged.  2. Depression, on medication.  3. Intellectual disability, on supportive care.  4. Hypertension, medically controlled.  5. Hyperlipidemia, on statin.  6. BPH, on tamsulosin.  7. GERD, on PPI.  8. Osteoarthritis, on Tylenol.  9. ASHD, on aspirin.  10. Neuropathy, on gabapentin.  11. Weakness, on PT/OT.  12. Fall risk, on fall precautions.    Scribe Attestation  By signing my name below, IElizabeth Scribe attest that this documentation has been prepared under the direction and in the presence of Don Lozada MD.     All medical record entries made by the scribe were personally dictated by me I have reviewed the chart and agree the record  accurately reflects my personal performance of his history physical examination and management      Electronically Signed By: Don Lozada MD   4/8/25 12:37 AM

## 2025-03-31 VITALS
RESPIRATION RATE: 16 BRPM | DIASTOLIC BLOOD PRESSURE: 78 MMHG | SYSTOLIC BLOOD PRESSURE: 128 MMHG | HEART RATE: 76 BPM | TEMPERATURE: 97.9 F

## 2025-03-31 ASSESSMENT — ENCOUNTER SYMPTOMS
CHILLS: 0
FEVER: 0

## 2025-03-31 NOTE — PROGRESS NOTES
PLACE OF SERVICE:  [facility]    This is a subsequent visit.    Subjective   Patient ID: Anthony Blair is a 89 y.o. male who presents for Follow-up.    Mr. Annetta Blair is an 89-year-old male with history of Alzheimer's dementia with bipolar disorder and depression.  He is unable to care for himself and manage his affairs.  He requires supportive care.    Review of Systems   Constitutional:  Negative for chills and fever.   Cardiovascular:  Negative for chest pain.   All other systems reviewed and are negative.    Objective   /78   Pulse 76   Temp 36.6 °C (97.9 °F)   Resp 16     Physical Exam  Vitals reviewed.   Constitutional:       General: He is not in acute distress.     Comments: This is a well-developed, well-nourished male, sitting in a chair.   HENT:      Right Ear: Tympanic membrane, ear canal and external ear normal.      Left Ear: Tympanic membrane, ear canal and external ear normal.   Eyes:      General: No scleral icterus.     Pupils: Pupils are equal, round, and reactive to light.   Neck:      Vascular: No carotid bruit.   Cardiovascular:      Heart sounds: Normal heart sounds, S1 normal and S2 normal. No murmur heard.     No friction rub.   Pulmonary:      Effort: Pulmonary effort is normal.      Breath sounds: Normal breath sounds and air entry.   Abdominal:      Palpations: There is no hepatomegaly, splenomegaly or mass.   Musculoskeletal:         General: No swelling or deformity. Normal range of motion.      Cervical back: Neck supple.      Right lower leg: No edema.      Left lower leg: No edema.   Lymphadenopathy:      Cervical: No cervical adenopathy.      Upper Body:      Right upper body: No axillary adenopathy.      Left upper body: No axillary adenopathy.      Lower Body: No right inguinal adenopathy. No left inguinal adenopathy.   Neurological:      Mental Status: He is alert.      Cranial Nerves: Cranial nerves 2-12 are intact. No cranial nerve deficit.      Sensory: No  sensory deficit.      Motor: Motor function is intact. No weakness.      Gait: Gait is intact.      Deep Tendon Reflexes: Reflexes normal.      Comments: The patient is oriented x2.   Psychiatric:         Mood and Affect: Mood normal. Mood is not anxious or depressed. Affect is not angry.         Behavior: Behavior is not agitated.         Thought Content: Thought content normal.         Judgment: Judgment normal.     LAB WORK:  Laboratory studies were reviewed.    Assessment/Plan   Problem List Items Addressed This Visit             ICD-10-CM    Intellectual disability F79    At risk for falls Z91.81    Depression F32.A    Gastroesophageal reflux disease without esophagitis K21.9    Osteoarthritis M19.90    Benign prostatic hyperplasia without urinary obstruction N40.0     Other Visit Diagnoses         Codes    Dementia, unspecified dementia severity, unspecified dementia type, unspecified whether behavioral, psychotic, or mood disturbance or anxiety (Multi)    -  Primary F03.90    Hypertension, unspecified type     I10    Hyperlipidemia, unspecified hyperlipidemia type     E78.5    ASHD (arteriosclerotic heart disease)     I25.10    Neuropathy     G62.9    Weakness     R53.1        1. Dementia, unchanged.  2. Depression, on medication.  3. Intellectual disability, on supportive care.  4. Hypertension, medically controlled.  5. Hyperlipidemia, on statin.  6. BPH, on tamsulosin.  7. GERD, on PPI.  8. Osteoarthritis, on Tylenol.  9. ASHD, on aspirin.  10. Neuropathy, on gabapentin.  11. Weakness, on PT/OT.  12. Fall risk, on fall precautions.    Scribe Attestation  By signing my name below, IElizabeth Scribe attest that this documentation has been prepared under the direction and in the presence of Don Lozada MD.     All medical record entries made by the scribe were personally dictated by me I have reviewed the chart and agree the record accurately reflects my personal performance of his history physical  examination and management

## 2025-04-13 ENCOUNTER — NURSING HOME VISIT (OUTPATIENT)
Dept: POST ACUTE CARE | Facility: EXTERNAL LOCATION | Age: 89
End: 2025-04-13
Payer: COMMERCIAL

## 2025-04-13 DIAGNOSIS — F32.A DEPRESSION, UNSPECIFIED DEPRESSION TYPE: ICD-10-CM

## 2025-04-13 DIAGNOSIS — I25.10 ASHD (ARTERIOSCLEROTIC HEART DISEASE): ICD-10-CM

## 2025-04-13 DIAGNOSIS — F03.90 DEMENTIA, UNSPECIFIED DEMENTIA SEVERITY, UNSPECIFIED DEMENTIA TYPE, UNSPECIFIED WHETHER BEHAVIORAL, PSYCHOTIC, OR MOOD DISTURBANCE OR ANXIETY (MULTI): ICD-10-CM

## 2025-04-13 DIAGNOSIS — Z91.81 AT RISK FOR FALLS: ICD-10-CM

## 2025-04-13 DIAGNOSIS — K21.9 GASTROESOPHAGEAL REFLUX DISEASE WITHOUT ESOPHAGITIS: ICD-10-CM

## 2025-04-13 DIAGNOSIS — I10 HYPERTENSION, UNSPECIFIED TYPE: ICD-10-CM

## 2025-04-13 DIAGNOSIS — G62.9 NEUROPATHY: ICD-10-CM

## 2025-04-13 DIAGNOSIS — M19.90 OSTEOARTHRITIS, UNSPECIFIED OSTEOARTHRITIS TYPE, UNSPECIFIED SITE: ICD-10-CM

## 2025-04-13 DIAGNOSIS — R53.1 WEAKNESS: ICD-10-CM

## 2025-04-13 DIAGNOSIS — N40.0 BENIGN PROSTATIC HYPERPLASIA WITHOUT URINARY OBSTRUCTION: ICD-10-CM

## 2025-04-13 DIAGNOSIS — F79 INTELLECTUAL DISABILITY: Primary | ICD-10-CM

## 2025-04-13 DIAGNOSIS — E78.5 HYPERLIPIDEMIA, UNSPECIFIED HYPERLIPIDEMIA TYPE: ICD-10-CM

## 2025-04-13 PROCEDURE — 99309 SBSQ NF CARE MODERATE MDM 30: CPT | Performed by: INTERNAL MEDICINE

## 2025-04-13 NOTE — LETTER
Patient: Anthony Blair  : 1935    Encounter Date: 2025    PLACE OF SERVICE:  [facility]    This is a subsequent visit.    Subjective  Patient ID: Anthony Blair is a 89 y.o. male who presents for Follow-up.    Mr. Anthony Blair is an 89-year-old male with history of intellectual disability with dementia.  He is unable to care for himself and requires supportive care.    Review of Systems   Constitutional:  Negative for chills and fever.   Cardiovascular:  Negative for chest pain.   All other systems reviewed and are negative.    Objective  /76   Pulse 76   Temp 36.6 °C (97.9 °F)   Resp 16     Physical Exam  Vitals reviewed.   Constitutional:       General: He is not in acute distress.     Comments: This is a well-developed, well-nourished male, sitting in a chair   HENT:      Right Ear: Tympanic membrane, ear canal and external ear normal.      Left Ear: Tympanic membrane, ear canal and external ear normal.   Eyes:      General: No scleral icterus.     Pupils: Pupils are equal, round, and reactive to light.   Neck:      Vascular: No carotid bruit.   Cardiovascular:      Heart sounds: Normal heart sounds, S1 normal and S2 normal. No murmur heard.     No friction rub.   Pulmonary:      Effort: Pulmonary effort is normal.      Breath sounds: Normal breath sounds and air entry.   Abdominal:      Palpations: There is no hepatomegaly, splenomegaly or mass.   Musculoskeletal:         General: No swelling or deformity. Normal range of motion.      Cervical back: Neck supple.      Right lower leg: No edema.      Left lower leg: No edema.   Lymphadenopathy:      Cervical: No cervical adenopathy.      Upper Body:      Right upper body: No axillary adenopathy.      Left upper body: No axillary adenopathy.      Lower Body: No right inguinal adenopathy. No left inguinal adenopathy.   Neurological:      Cranial Nerves: Cranial nerves 2-12 are intact. No cranial nerve deficit.      Sensory: No sensory  deficit.      Motor: Motor function is intact. No weakness.      Gait: Gait is intact.      Deep Tendon Reflexes: Reflexes normal.      Comments: The patient is alert and oriented x2.   Psychiatric:         Mood and Affect: Mood normal. Mood is not anxious or depressed. Affect is not angry.         Behavior: Behavior is not agitated.         Thought Content: Thought content normal.         Judgment: Judgment normal.     LAB WORK:  Laboratory studies were reviewed.    Assessment/Plan  Problem List Items Addressed This Visit             ICD-10-CM    Intellectual disability - Primary F79    At risk for falls Z91.81    Depression F32.A    Gastroesophageal reflux disease without esophagitis K21.9    Osteoarthritis M19.90    Benign prostatic hyperplasia without urinary obstruction N40.0     Other Visit Diagnoses         Codes    Dementia, unspecified dementia severity, unspecified dementia type, unspecified whether behavioral, psychotic, or mood disturbance or anxiety (Multi)     F03.90    Hypertension, unspecified type     I10    Hyperlipidemia, unspecified hyperlipidemia type     E78.5    ASHD (arteriosclerotic heart disease)     I25.10    Neuropathy     G62.9    Weakness     R53.1        1. Intellectual disability, on supportive care.  2. Dementia, unchanged.  3. Hypertension, medically controlled.  4. Depression, on medication.  5. GERD, on PPI.  6. BPH, on tamsulosin.  7. Hyperlipidemia, on statin.  8. ASHD, on aspirin.  9. Neuropathy, on gabapentin.  10. Osteoarthritis, on Tylenol.  11. Weakness, on PT/OT.  12. Fall risk, on fall precautions.    Scribe Attestation  By signing my name below, IElizabeth Scribe attest that this documentation has been prepared under the direction and in the presence of Don Lozada MD.     All medical record entries made by the scribe were personally dictated by me I have reviewed the chart and agree the record accurately reflects my personal performance of his history physical  examination and management      Electronically Signed By: Don Lozada MD   4/20/25 10:55 PM

## 2025-04-14 VITALS
SYSTOLIC BLOOD PRESSURE: 124 MMHG | DIASTOLIC BLOOD PRESSURE: 76 MMHG | RESPIRATION RATE: 16 BRPM | TEMPERATURE: 97.9 F | HEART RATE: 76 BPM

## 2025-04-14 ASSESSMENT — ENCOUNTER SYMPTOMS
FEVER: 0
CHILLS: 0

## 2025-04-14 NOTE — PROGRESS NOTES
PLACE OF SERVICE:  [facility]    This is a subsequent visit.    Subjective   Patient ID: Anthony Blair is a 89 y.o. male who presents for Follow-up.    Mr. Anthony Blair is an 89-year-old male with history of intellectual disability with dementia.  He is unable to care for himself and requires supportive care.    Review of Systems   Constitutional:  Negative for chills and fever.   Cardiovascular:  Negative for chest pain.   All other systems reviewed and are negative.    Objective   /76   Pulse 76   Temp 36.6 °C (97.9 °F)   Resp 16     Physical Exam  Vitals reviewed.   Constitutional:       General: He is not in acute distress.     Comments: This is a well-developed, well-nourished male, sitting in a chair   HENT:      Right Ear: Tympanic membrane, ear canal and external ear normal.      Left Ear: Tympanic membrane, ear canal and external ear normal.   Eyes:      General: No scleral icterus.     Pupils: Pupils are equal, round, and reactive to light.   Neck:      Vascular: No carotid bruit.   Cardiovascular:      Heart sounds: Normal heart sounds, S1 normal and S2 normal. No murmur heard.     No friction rub.   Pulmonary:      Effort: Pulmonary effort is normal.      Breath sounds: Normal breath sounds and air entry.   Abdominal:      Palpations: There is no hepatomegaly, splenomegaly or mass.   Musculoskeletal:         General: No swelling or deformity. Normal range of motion.      Cervical back: Neck supple.      Right lower leg: No edema.      Left lower leg: No edema.   Lymphadenopathy:      Cervical: No cervical adenopathy.      Upper Body:      Right upper body: No axillary adenopathy.      Left upper body: No axillary adenopathy.      Lower Body: No right inguinal adenopathy. No left inguinal adenopathy.   Neurological:      Cranial Nerves: Cranial nerves 2-12 are intact. No cranial nerve deficit.      Sensory: No sensory deficit.      Motor: Motor function is intact. No weakness.      Gait:  Gait is intact.      Deep Tendon Reflexes: Reflexes normal.      Comments: The patient is alert and oriented x2.   Psychiatric:         Mood and Affect: Mood normal. Mood is not anxious or depressed. Affect is not angry.         Behavior: Behavior is not agitated.         Thought Content: Thought content normal.         Judgment: Judgment normal.     LAB WORK:  Laboratory studies were reviewed.    Assessment/Plan   Problem List Items Addressed This Visit             ICD-10-CM    Intellectual disability - Primary F79    At risk for falls Z91.81    Depression F32.A    Gastroesophageal reflux disease without esophagitis K21.9    Osteoarthritis M19.90    Benign prostatic hyperplasia without urinary obstruction N40.0     Other Visit Diagnoses         Codes    Dementia, unspecified dementia severity, unspecified dementia type, unspecified whether behavioral, psychotic, or mood disturbance or anxiety (Multi)     F03.90    Hypertension, unspecified type     I10    Hyperlipidemia, unspecified hyperlipidemia type     E78.5    ASHD (arteriosclerotic heart disease)     I25.10    Neuropathy     G62.9    Weakness     R53.1        1. Intellectual disability, on supportive care.  2. Dementia, unchanged.  3. Hypertension, medically controlled.  4. Depression, on medication.  5. GERD, on PPI.  6. BPH, on tamsulosin.  7. Hyperlipidemia, on statin.  8. ASHD, on aspirin.  9. Neuropathy, on gabapentin.  10. Osteoarthritis, on Tylenol.  11. Weakness, on PT/OT.  12. Fall risk, on fall precautions.    Scribe Attestation  By signing my name below, IElizabeth Scribe attest that this documentation has been prepared under the direction and in the presence of Don Lozada MD.     All medical record entries made by the scribe were personally dictated by me I have reviewed the chart and agree the record accurately reflects my personal performance of his history physical examination and management

## 2025-04-19 ENCOUNTER — NURSING HOME VISIT (OUTPATIENT)
Dept: POST ACUTE CARE | Facility: EXTERNAL LOCATION | Age: 89
End: 2025-04-19
Payer: COMMERCIAL

## 2025-04-19 DIAGNOSIS — F32.A DEPRESSION, UNSPECIFIED DEPRESSION TYPE: Primary | ICD-10-CM

## 2025-04-19 DIAGNOSIS — I25.10 ASHD (ARTERIOSCLEROTIC HEART DISEASE): ICD-10-CM

## 2025-04-19 DIAGNOSIS — E78.5 HYPERLIPIDEMIA, UNSPECIFIED HYPERLIPIDEMIA TYPE: ICD-10-CM

## 2025-04-19 DIAGNOSIS — G62.9 NEUROPATHY: ICD-10-CM

## 2025-04-19 DIAGNOSIS — F79 INTELLECTUAL DISABILITY: ICD-10-CM

## 2025-04-19 DIAGNOSIS — R53.1 WEAKNESS: ICD-10-CM

## 2025-04-19 DIAGNOSIS — Z91.81 AT RISK FOR FALLS: ICD-10-CM

## 2025-04-19 DIAGNOSIS — F03.90 DEMENTIA, UNSPECIFIED DEMENTIA SEVERITY, UNSPECIFIED DEMENTIA TYPE, UNSPECIFIED WHETHER BEHAVIORAL, PSYCHOTIC, OR MOOD DISTURBANCE OR ANXIETY (MULTI): ICD-10-CM

## 2025-04-19 DIAGNOSIS — M19.90 OSTEOARTHRITIS, UNSPECIFIED OSTEOARTHRITIS TYPE, UNSPECIFIED SITE: ICD-10-CM

## 2025-04-19 DIAGNOSIS — N40.0 BENIGN PROSTATIC HYPERPLASIA WITHOUT URINARY OBSTRUCTION: ICD-10-CM

## 2025-04-19 DIAGNOSIS — I10 HYPERTENSION, UNSPECIFIED TYPE: ICD-10-CM

## 2025-04-19 DIAGNOSIS — K21.9 GASTROESOPHAGEAL REFLUX DISEASE WITHOUT ESOPHAGITIS: ICD-10-CM

## 2025-04-19 PROCEDURE — 99309 SBSQ NF CARE MODERATE MDM 30: CPT | Performed by: INTERNAL MEDICINE

## 2025-04-19 NOTE — LETTER
Patient: Anthony Blair  : 1935    Encounter Date: 2025    PLACE OF SERVICE:  [facility]    This is a subsequent visit.    Subjective  Patient ID: Anthony Blair is a 89 y.o. male who presents for Follow-up.    Mr. Anthony Blair is an 89-year-old male with history of dementia with intellectual disability.  He has multiple medical issues.  He is unable to care for himself.  He requires supportive care.    Review of Systems   Constitutional:  Negative for chills and fever.   Cardiovascular:  Negative for chest pain.   All other systems reviewed and are negative.    Objective  /78   Pulse 76   Temp 36.6 °C (97.8 °F)   Resp 16     Physical Exam  Vitals reviewed.   Constitutional:       General: He is not in acute distress.     Comments: This is a well-developed, well-nourished male, sitting in a chair.   HENT:      Right Ear: Tympanic membrane, ear canal and external ear normal.      Left Ear: Tympanic membrane, ear canal and external ear normal.   Eyes:      General: No scleral icterus.     Pupils: Pupils are equal, round, and reactive to light.   Neck:      Vascular: No carotid bruit.   Cardiovascular:      Heart sounds: Normal heart sounds, S1 normal and S2 normal. No murmur heard.     No friction rub.   Pulmonary:      Effort: Pulmonary effort is normal.      Breath sounds: Normal breath sounds and air entry.   Abdominal:      Palpations: There is no hepatomegaly, splenomegaly or mass.   Musculoskeletal:         General: No swelling or deformity. Normal range of motion.      Cervical back: Neck supple.      Right lower leg: No edema.      Left lower leg: No edema.   Lymphadenopathy:      Cervical: No cervical adenopathy.      Upper Body:      Right upper body: No axillary adenopathy.      Left upper body: No axillary adenopathy.      Lower Body: No right inguinal adenopathy. No left inguinal adenopathy.   Neurological:      Mental Status: He is alert.      Cranial Nerves: Cranial nerves  2-12 are intact. No cranial nerve deficit.      Sensory: No sensory deficit.      Motor: Motor function is intact. No weakness.      Gait: Gait is intact.      Deep Tendon Reflexes: Reflexes normal.      Comments: The patient is oriented x2.   Psychiatric:         Mood and Affect: Mood normal. Mood is not anxious or depressed. Affect is not angry.         Behavior: Behavior is not agitated.         Thought Content: Thought content normal.         Judgment: Judgment normal.     LAB WORK:  Laboratory studies were reviewed.    Assessment/Plan  Problem List Items Addressed This Visit           ICD-10-CM    Intellectual disability F79    At risk for falls Z91.81    Depression - Primary F32.A    Gastroesophageal reflux disease without esophagitis K21.9    Osteoarthritis M19.90    Benign prostatic hyperplasia without urinary obstruction N40.0     Other Visit Diagnoses         Codes      Dementia, unspecified dementia severity, unspecified dementia type, unspecified whether behavioral, psychotic, or mood disturbance or anxiety (Multi)     F03.90      Hypertension, unspecified type     I10      Hyperlipidemia, unspecified hyperlipidemia type     E78.5      Neuropathy     G62.9      ASHD (arteriosclerotic heart disease)     I25.10      Weakness     R53.1        1. Depression, on medication.  2. Dementia, unchanged.  3. Intellectual disability, on supportive care.  4. Hypertension, medically controlled.  5. Hyperlipidemia, on statin.  6. BPH, on tamsulosin.  7. GERD, on PPI.  8. Neuropathy, on gabapentin.  9. ASHD, on aspirin.  10. Osteoarthritis, on Tylenol.  11. Weakness, on PT/OT.  12. Fall risk, on fall precautions.    Scribe Attestation  By signing my name below, IElizabeth Scribe attest that this documentation has been prepared under the direction and in the presence of Don Lozada MD.    All medical record entries made by the scribtito were personally dictated by me I have reviewed the chart and agree the record  accurately reflects my personal performance of his history physical examination and management      Electronically Signed By: Don Lozada MD   4/24/25 11:41 PM

## 2025-04-22 VITALS
SYSTOLIC BLOOD PRESSURE: 124 MMHG | HEART RATE: 76 BPM | DIASTOLIC BLOOD PRESSURE: 78 MMHG | TEMPERATURE: 97.8 F | RESPIRATION RATE: 16 BRPM

## 2025-04-22 ASSESSMENT — ENCOUNTER SYMPTOMS
CHILLS: 0
FEVER: 0

## 2025-04-22 NOTE — PROGRESS NOTES
PLACE OF SERVICE:  [facility]    This is a subsequent visit.    Subjective   Patient ID: Anthony Blair is a 89 y.o. male who presents for Follow-up.    Mr. Anthony Blair is an 89-year-old male with history of dementia with intellectual disability.  He has multiple medical issues.  He is unable to care for himself.  He requires supportive care.    Review of Systems   Constitutional:  Negative for chills and fever.   Cardiovascular:  Negative for chest pain.   All other systems reviewed and are negative.    Objective   /78   Pulse 76   Temp 36.6 °C (97.8 °F)   Resp 16     Physical Exam  Vitals reviewed.   Constitutional:       General: He is not in acute distress.     Comments: This is a well-developed, well-nourished male, sitting in a chair.   HENT:      Right Ear: Tympanic membrane, ear canal and external ear normal.      Left Ear: Tympanic membrane, ear canal and external ear normal.   Eyes:      General: No scleral icterus.     Pupils: Pupils are equal, round, and reactive to light.   Neck:      Vascular: No carotid bruit.   Cardiovascular:      Heart sounds: Normal heart sounds, S1 normal and S2 normal. No murmur heard.     No friction rub.   Pulmonary:      Effort: Pulmonary effort is normal.      Breath sounds: Normal breath sounds and air entry.   Abdominal:      Palpations: There is no hepatomegaly, splenomegaly or mass.   Musculoskeletal:         General: No swelling or deformity. Normal range of motion.      Cervical back: Neck supple.      Right lower leg: No edema.      Left lower leg: No edema.   Lymphadenopathy:      Cervical: No cervical adenopathy.      Upper Body:      Right upper body: No axillary adenopathy.      Left upper body: No axillary adenopathy.      Lower Body: No right inguinal adenopathy. No left inguinal adenopathy.   Neurological:      Mental Status: He is alert.      Cranial Nerves: Cranial nerves 2-12 are intact. No cranial nerve deficit.      Sensory: No sensory  deficit.      Motor: Motor function is intact. No weakness.      Gait: Gait is intact.      Deep Tendon Reflexes: Reflexes normal.      Comments: The patient is oriented x2.   Psychiatric:         Mood and Affect: Mood normal. Mood is not anxious or depressed. Affect is not angry.         Behavior: Behavior is not agitated.         Thought Content: Thought content normal.         Judgment: Judgment normal.     LAB WORK:  Laboratory studies were reviewed.    Assessment/Plan   Problem List Items Addressed This Visit           ICD-10-CM    Intellectual disability F79    At risk for falls Z91.81    Depression - Primary F32.A    Gastroesophageal reflux disease without esophagitis K21.9    Osteoarthritis M19.90    Benign prostatic hyperplasia without urinary obstruction N40.0     Other Visit Diagnoses         Codes      Dementia, unspecified dementia severity, unspecified dementia type, unspecified whether behavioral, psychotic, or mood disturbance or anxiety (Multi)     F03.90      Hypertension, unspecified type     I10      Hyperlipidemia, unspecified hyperlipidemia type     E78.5      Neuropathy     G62.9      ASHD (arteriosclerotic heart disease)     I25.10      Weakness     R53.1        1. Depression, on medication.  2. Dementia, unchanged.  3. Intellectual disability, on supportive care.  4. Hypertension, medically controlled.  5. Hyperlipidemia, on statin.  6. BPH, on tamsulosin.  7. GERD, on PPI.  8. Neuropathy, on gabapentin.  9. ASHD, on aspirin.  10. Osteoarthritis, on Tylenol.  11. Weakness, on PT/OT.  12. Fall risk, on fall precautions.    Scribe Attestation  By signing my name below, IElizabeth Scribe attest that this documentation has been prepared under the direction and in the presence of Don Lozada MD.    All medical record entries made by the scribe were personally dictated by me I have reviewed the chart and agree the record accurately reflects my personal performance of his history physical  examination and management

## 2025-05-03 ENCOUNTER — NURSING HOME VISIT (OUTPATIENT)
Dept: POST ACUTE CARE | Facility: EXTERNAL LOCATION | Age: 89
End: 2025-05-03
Payer: MEDICARE

## 2025-05-03 DIAGNOSIS — M19.90 OSTEOARTHRITIS, UNSPECIFIED OSTEOARTHRITIS TYPE, UNSPECIFIED SITE: ICD-10-CM

## 2025-05-03 DIAGNOSIS — G62.9 NEUROPATHY: ICD-10-CM

## 2025-05-03 DIAGNOSIS — E78.5 HYPERLIPIDEMIA, UNSPECIFIED HYPERLIPIDEMIA TYPE: ICD-10-CM

## 2025-05-03 DIAGNOSIS — N40.0 BENIGN PROSTATIC HYPERPLASIA WITHOUT URINARY OBSTRUCTION: ICD-10-CM

## 2025-05-03 DIAGNOSIS — F32.A DEPRESSION, UNSPECIFIED DEPRESSION TYPE: ICD-10-CM

## 2025-05-03 DIAGNOSIS — I25.10 ASHD (ARTERIOSCLEROTIC HEART DISEASE): ICD-10-CM

## 2025-05-03 DIAGNOSIS — I10 HYPERTENSION, UNSPECIFIED TYPE: ICD-10-CM

## 2025-05-03 DIAGNOSIS — F03.90 DEMENTIA, UNSPECIFIED DEMENTIA SEVERITY, UNSPECIFIED DEMENTIA TYPE, UNSPECIFIED WHETHER BEHAVIORAL, PSYCHOTIC, OR MOOD DISTURBANCE OR ANXIETY (MULTI): ICD-10-CM

## 2025-05-03 DIAGNOSIS — K21.9 GASTROESOPHAGEAL REFLUX DISEASE WITHOUT ESOPHAGITIS: ICD-10-CM

## 2025-05-03 DIAGNOSIS — Z91.81 AT RISK FOR FALLS: ICD-10-CM

## 2025-05-03 DIAGNOSIS — F79 INTELLECTUAL DISABILITY: Primary | ICD-10-CM

## 2025-05-03 PROCEDURE — 99309 SBSQ NF CARE MODERATE MDM 30: CPT | Performed by: INTERNAL MEDICINE

## 2025-05-03 NOTE — LETTER
Patient: Anthony Blair  : 1935    Encounter Date: 2025    PLACE OF SERVICE:  [facility].    This is a subsequent visit.    Subjective  Patient ID: Anthony Blair is a 89 y.o. male who presents for Follow-up.    Mr. Anthony Blair is an 89-year-old male with history of intellectual disability with depression and dementia.  He has several medical issues.  He is unable to care for himself.  He requires supportive care.    Review of Systems   Constitutional:  Negative for chills and fever.   Cardiovascular:  Negative for chest pain.   All other systems reviewed and are negative.    Objective  /78   Pulse 76   Temp 36.6 °C (97.8 °F)   Resp 16     Physical Exam  Vitals reviewed.   Constitutional:       General: He is not in acute distress.     Comments: This is a well-developed, well-nourished male, sitting in a chair.   HENT:      Right Ear: Tympanic membrane, ear canal and external ear normal.      Left Ear: Tympanic membrane, ear canal and external ear normal.   Eyes:      General: No scleral icterus.     Pupils: Pupils are equal, round, and reactive to light.   Neck:      Vascular: No carotid bruit.   Cardiovascular:      Heart sounds: Normal heart sounds, S1 normal and S2 normal. No murmur heard.     No friction rub.   Pulmonary:      Effort: Pulmonary effort is normal.      Breath sounds: Normal breath sounds and air entry.   Abdominal:      Palpations: There is no hepatomegaly, splenomegaly or mass.   Musculoskeletal:         General: No swelling or deformity. Normal range of motion.      Cervical back: Neck supple.      Right lower leg: No edema.      Left lower leg: No edema.   Lymphadenopathy:      Cervical: No cervical adenopathy.      Upper Body:      Right upper body: No axillary adenopathy.      Left upper body: No axillary adenopathy.      Lower Body: No right inguinal adenopathy. No left inguinal adenopathy.   Neurological:      Mental Status: He is alert.      Cranial Nerves:  Cranial nerves 2-12 are intact. No cranial nerve deficit.      Sensory: No sensory deficit.      Motor: Motor function is intact. No weakness.      Gait: Gait is intact.      Deep Tendon Reflexes: Reflexes normal.      Comments: The patient is oriented x2.   Psychiatric:         Mood and Affect: Mood normal. Mood is not anxious or depressed. Affect is not angry.         Behavior: Behavior is not agitated.         Thought Content: Thought content normal.         Judgment: Judgment normal.     LAB WORK:  Laboratory studies were reviewed.    Assessment/Plan  Problem List Items Addressed This Visit           ICD-10-CM       Advance Directives and General Issues    At risk for falls Z91.81       Gastrointestinal and Abdominal    Gastroesophageal reflux disease without esophagitis K21.9       Genitourinary and Reproductive    Benign prostatic hyperplasia without urinary obstruction N40.0       Mental Health    Depression F32.A       Musculoskeletal and Injuries    Osteoarthritis M19.90       Neuro    Intellectual disability - Primary F79     Other Visit Diagnoses         Codes      Hypertension, unspecified type     I10      Dementia, unspecified dementia severity, unspecified dementia type, unspecified whether behavioral, psychotic, or mood disturbance or anxiety (Multi)     F03.90      Hyperlipidemia, unspecified hyperlipidemia type     E78.5      Neuropathy     G62.9      ASHD (arteriosclerotic heart disease)     I25.10        1. Intellectual disability, on supportive care.  2. Hypertension, medically controlled.  3. Depression, on medication.  4. Dementia, unchanged.  5. GERD, on PPI.  6. BPH, on tamsulosin.  7. Hyperlipidemia, on statin.  8. Osteoarthritis, on Tylenol.  9. Neuropathy, on gabapentin.  10. ASHD, on aspirin.  11. Fall risk, on fall precautions.    Scribe Attestation  By signing my name below, IAntonette, Scribe attest that this documentation has been prepared under the direction and in the presence of  Don Lozada MD.     All medical record entries made by the scribe were personally dictated by me I have reviewed the chart and agree the record accurately reflects my personal performance of his history physical examination and management      Electronically Signed By: Don Lozada MD   5/6/25 11:12 PM

## 2025-05-05 VITALS
DIASTOLIC BLOOD PRESSURE: 78 MMHG | RESPIRATION RATE: 16 BRPM | TEMPERATURE: 97.8 F | SYSTOLIC BLOOD PRESSURE: 126 MMHG | HEART RATE: 76 BPM

## 2025-05-05 ASSESSMENT — ENCOUNTER SYMPTOMS
CHILLS: 0
FEVER: 0

## 2025-05-05 NOTE — PROGRESS NOTES
PLACE OF SERVICE:  [facility].    This is a subsequent visit.    Subjective   Patient ID: Anthony Blair is a 89 y.o. male who presents for Follow-up.    Mr. Anthony Blair is an 89-year-old male with history of intellectual disability with depression and dementia.  He has several medical issues.  He is unable to care for himself.  He requires supportive care.    Review of Systems   Constitutional:  Negative for chills and fever.   Cardiovascular:  Negative for chest pain.   All other systems reviewed and are negative.    Objective   /78   Pulse 76   Temp 36.6 °C (97.8 °F)   Resp 16     Physical Exam  Vitals reviewed.   Constitutional:       General: He is not in acute distress.     Comments: This is a well-developed, well-nourished male, sitting in a chair.   HENT:      Right Ear: Tympanic membrane, ear canal and external ear normal.      Left Ear: Tympanic membrane, ear canal and external ear normal.   Eyes:      General: No scleral icterus.     Pupils: Pupils are equal, round, and reactive to light.   Neck:      Vascular: No carotid bruit.   Cardiovascular:      Heart sounds: Normal heart sounds, S1 normal and S2 normal. No murmur heard.     No friction rub.   Pulmonary:      Effort: Pulmonary effort is normal.      Breath sounds: Normal breath sounds and air entry.   Abdominal:      Palpations: There is no hepatomegaly, splenomegaly or mass.   Musculoskeletal:         General: No swelling or deformity. Normal range of motion.      Cervical back: Neck supple.      Right lower leg: No edema.      Left lower leg: No edema.   Lymphadenopathy:      Cervical: No cervical adenopathy.      Upper Body:      Right upper body: No axillary adenopathy.      Left upper body: No axillary adenopathy.      Lower Body: No right inguinal adenopathy. No left inguinal adenopathy.   Neurological:      Mental Status: He is alert.      Cranial Nerves: Cranial nerves 2-12 are intact. No cranial nerve deficit.       Sensory: No sensory deficit.      Motor: Motor function is intact. No weakness.      Gait: Gait is intact.      Deep Tendon Reflexes: Reflexes normal.      Comments: The patient is oriented x2.   Psychiatric:         Mood and Affect: Mood normal. Mood is not anxious or depressed. Affect is not angry.         Behavior: Behavior is not agitated.         Thought Content: Thought content normal.         Judgment: Judgment normal.     LAB WORK:  Laboratory studies were reviewed.    Assessment/Plan   Problem List Items Addressed This Visit           ICD-10-CM       Advance Directives and General Issues    At risk for falls Z91.81       Gastrointestinal and Abdominal    Gastroesophageal reflux disease without esophagitis K21.9       Genitourinary and Reproductive    Benign prostatic hyperplasia without urinary obstruction N40.0       Mental Health    Depression F32.A       Musculoskeletal and Injuries    Osteoarthritis M19.90       Neuro    Intellectual disability - Primary F79     Other Visit Diagnoses         Codes      Hypertension, unspecified type     I10      Dementia, unspecified dementia severity, unspecified dementia type, unspecified whether behavioral, psychotic, or mood disturbance or anxiety (Multi)     F03.90      Hyperlipidemia, unspecified hyperlipidemia type     E78.5      Neuropathy     G62.9      ASHD (arteriosclerotic heart disease)     I25.10        1. Intellectual disability, on supportive care.  2. Hypertension, medically controlled.  3. Depression, on medication.  4. Dementia, unchanged.  5. GERD, on PPI.  6. BPH, on tamsulosin.  7. Hyperlipidemia, on statin.  8. Osteoarthritis, on Tylenol.  9. Neuropathy, on gabapentin.  10. ASHD, on aspirin.  11. Fall risk, on fall precautions.    Scribe Attestation  By signing my name below, IAntonette, Xander attest that this documentation has been prepared under the direction and in the presence of Don Lozada MD.     All medical record entries made by  the scribe were personally dictated by me I have reviewed the chart and agree the record accurately reflects my personal performance of his history physical examination and management

## 2025-06-01 ENCOUNTER — NURSING HOME VISIT (OUTPATIENT)
Dept: POST ACUTE CARE | Facility: EXTERNAL LOCATION | Age: 89
End: 2025-06-01
Payer: MEDICARE

## 2025-06-01 DIAGNOSIS — F03.90 DEMENTIA, UNSPECIFIED DEMENTIA SEVERITY, UNSPECIFIED DEMENTIA TYPE, UNSPECIFIED WHETHER BEHAVIORAL, PSYCHOTIC, OR MOOD DISTURBANCE OR ANXIETY (MULTI): ICD-10-CM

## 2025-06-01 DIAGNOSIS — F32.A DEPRESSION, UNSPECIFIED DEPRESSION TYPE: Primary | ICD-10-CM

## 2025-06-01 DIAGNOSIS — M19.90 OSTEOARTHRITIS, UNSPECIFIED OSTEOARTHRITIS TYPE, UNSPECIFIED SITE: ICD-10-CM

## 2025-06-01 DIAGNOSIS — E78.5 HYPERLIPIDEMIA, UNSPECIFIED HYPERLIPIDEMIA TYPE: ICD-10-CM

## 2025-06-01 DIAGNOSIS — Z91.81 AT RISK FOR FALLS: ICD-10-CM

## 2025-06-01 DIAGNOSIS — F31.9 BIPOLAR AFFECTIVE DISORDER, REMISSION STATUS UNSPECIFIED (MULTI): ICD-10-CM

## 2025-06-01 DIAGNOSIS — F79 INTELLECTUAL DISABILITY: ICD-10-CM

## 2025-06-01 DIAGNOSIS — G62.9 NEUROPATHY: ICD-10-CM

## 2025-06-01 DIAGNOSIS — K21.9 GASTROESOPHAGEAL REFLUX DISEASE WITHOUT ESOPHAGITIS: ICD-10-CM

## 2025-06-01 DIAGNOSIS — I10 HYPERTENSION, UNSPECIFIED TYPE: ICD-10-CM

## 2025-06-01 DIAGNOSIS — N40.0 BENIGN PROSTATIC HYPERPLASIA WITHOUT URINARY OBSTRUCTION: ICD-10-CM

## 2025-06-01 DIAGNOSIS — R53.1 WEAKNESS: ICD-10-CM

## 2025-06-01 PROCEDURE — 99309 SBSQ NF CARE MODERATE MDM 30: CPT | Performed by: INTERNAL MEDICINE

## 2025-06-01 NOTE — LETTER
Patient: Anthony Blair  : 1935    Encounter Date: 2025    PLACE OF SERVICE:  [facility].    This is a subsequent visit.    Subjective  Patient ID: Anthony Blair is a 89 y.o. male who presents for Follow-up.    Mr. Anthony Blair is an 89-year-old male with history of Alzheimer's dementia with depression and bipolar disorder.  He is unable to care for himself and manage his affairs.  He requires supportive care.    Review of Systems   Constitutional:  Negative for chills and fever.   Cardiovascular:  Negative for chest pain.   All other systems reviewed and are negative.    Objective  /78   Pulse 76   Temp 36.6 °C (97.9 °F)   Resp 16     Physical Exam  Vitals reviewed.   Constitutional:       General: He is not in acute distress.     Comments: This is a well-developed and well-nourished male, sitting in a chair.   HENT:      Right Ear: Tympanic membrane, ear canal and external ear normal.      Left Ear: Tympanic membrane, ear canal and external ear normal.   Eyes:      General: No scleral icterus.     Pupils: Pupils are equal, round, and reactive to light.   Neck:      Vascular: No carotid bruit.   Cardiovascular:      Heart sounds: Normal heart sounds, S1 normal and S2 normal. No murmur heard.     No friction rub.   Pulmonary:      Effort: Pulmonary effort is normal.      Breath sounds: Normal breath sounds and air entry.   Abdominal:      Palpations: There is no hepatomegaly, splenomegaly or mass.   Musculoskeletal:         General: No swelling or deformity. Normal range of motion.      Cervical back: Neck supple.      Right lower leg: No edema.      Left lower leg: No edema.   Lymphadenopathy:      Cervical: No cervical adenopathy.      Upper Body:      Right upper body: No axillary adenopathy.      Left upper body: No axillary adenopathy.      Lower Body: No right inguinal adenopathy. No left inguinal adenopathy.   Neurological:      Mental Status: He is alert.      Cranial  Nerves: Cranial nerves 2-12 are intact. No cranial nerve deficit.      Sensory: No sensory deficit.      Motor: Motor function is intact. No weakness.      Gait: Gait is intact.      Deep Tendon Reflexes: Reflexes normal.      Comments: The patient is oriented x2.   Psychiatric:         Mood and Affect: Mood normal. Mood is not anxious or depressed. Affect is not angry.         Behavior: Behavior is not agitated.         Thought Content: Thought content normal.         Judgment: Judgment normal.     LAB WORK:  Laboratory studies were reviewed.    Assessment/Plan  Problem List Items Addressed This Visit           ICD-10-CM       Advance Directives and General Issues    At risk for falls Z91.81       Gastrointestinal and Abdominal    Gastroesophageal reflux disease without esophagitis K21.9       Genitourinary and Reproductive    Benign prostatic hyperplasia without urinary obstruction N40.0       Mental Health    Depression - Primary F32.A       Musculoskeletal and Injuries    Osteoarthritis M19.90       Neuro    Intellectual disability F79     Other Visit Diagnoses         Codes      Dementia, unspecified dementia severity, unspecified dementia type, unspecified whether behavioral, psychotic, or mood disturbance or anxiety (Multi)     F03.90      Bipolar affective disorder, remission status unspecified (Multi)     F31.9      Hypertension, unspecified type     I10      Hyperlipidemia, unspecified hyperlipidemia type     E78.5      Neuropathy     G62.9      Weakness     R53.1        1. Depression, on medication.  2. Dementia, unchanged.  3. Bipolar disorder, on medication.  4. Hypertension, medically controlled.  5. Hyperlipidemia, on statin.  6. Neuropathy, on gabapentin.  7. BPH, on tamsulosin.  8. GERD, on PPI.  9. Intellectual disability, on supportive care.  10. Osteoarthritis, on Tylenol.  11. Weakness, on PT/OT.  12. Fall risk, on fall precautions.    Scribe Attestation  By signing my name below, I, Antonette Bravo,  Scribe attest that this documentation has been prepared under the direction and in the presence of Don Lozada MD.     All medical record entries made by the scribe were personally dictated by me I have reviewed the chart and agree the record accurately reflects my personal performance of his history physical examination and management      Electronically Signed By: Don Lozada MD   6/9/25 12:04 AM

## 2025-06-05 VITALS
HEART RATE: 76 BPM | DIASTOLIC BLOOD PRESSURE: 78 MMHG | TEMPERATURE: 97.9 F | SYSTOLIC BLOOD PRESSURE: 124 MMHG | RESPIRATION RATE: 16 BRPM

## 2025-06-05 ASSESSMENT — ENCOUNTER SYMPTOMS
CHILLS: 0
FEVER: 0

## 2025-06-05 NOTE — PROGRESS NOTES
PLACE OF SERVICE:  [facility].    This is a subsequent visit.    Subjective   Patient ID: Anthony Blair is a 89 y.o. male who presents for Follow-up.    Mr. Anthony Blair is an 89-year-old male with history of Alzheimer's dementia with depression and bipolar disorder.  He is unable to care for himself and manage his affairs.  He requires supportive care.    Review of Systems   Constitutional:  Negative for chills and fever.   Cardiovascular:  Negative for chest pain.   All other systems reviewed and are negative.    Objective   /78   Pulse 76   Temp 36.6 °C (97.9 °F)   Resp 16     Physical Exam  Vitals reviewed.   Constitutional:       General: He is not in acute distress.     Comments: This is a well-developed and well-nourished male, sitting in a chair.   HENT:      Right Ear: Tympanic membrane, ear canal and external ear normal.      Left Ear: Tympanic membrane, ear canal and external ear normal.   Eyes:      General: No scleral icterus.     Pupils: Pupils are equal, round, and reactive to light.   Neck:      Vascular: No carotid bruit.   Cardiovascular:      Heart sounds: Normal heart sounds, S1 normal and S2 normal. No murmur heard.     No friction rub.   Pulmonary:      Effort: Pulmonary effort is normal.      Breath sounds: Normal breath sounds and air entry.   Abdominal:      Palpations: There is no hepatomegaly, splenomegaly or mass.   Musculoskeletal:         General: No swelling or deformity. Normal range of motion.      Cervical back: Neck supple.      Right lower leg: No edema.      Left lower leg: No edema.   Lymphadenopathy:      Cervical: No cervical adenopathy.      Upper Body:      Right upper body: No axillary adenopathy.      Left upper body: No axillary adenopathy.      Lower Body: No right inguinal adenopathy. No left inguinal adenopathy.   Neurological:      Mental Status: He is alert.      Cranial Nerves: Cranial nerves 2-12 are intact. No cranial nerve deficit.       Sensory: No sensory deficit.      Motor: Motor function is intact. No weakness.      Gait: Gait is intact.      Deep Tendon Reflexes: Reflexes normal.      Comments: The patient is oriented x2.   Psychiatric:         Mood and Affect: Mood normal. Mood is not anxious or depressed. Affect is not angry.         Behavior: Behavior is not agitated.         Thought Content: Thought content normal.         Judgment: Judgment normal.     LAB WORK:  Laboratory studies were reviewed.    Assessment/Plan   Problem List Items Addressed This Visit           ICD-10-CM       Advance Directives and General Issues    At risk for falls Z91.81       Gastrointestinal and Abdominal    Gastroesophageal reflux disease without esophagitis K21.9       Genitourinary and Reproductive    Benign prostatic hyperplasia without urinary obstruction N40.0       Mental Health    Depression - Primary F32.A       Musculoskeletal and Injuries    Osteoarthritis M19.90       Neuro    Intellectual disability F79     Other Visit Diagnoses         Codes      Dementia, unspecified dementia severity, unspecified dementia type, unspecified whether behavioral, psychotic, or mood disturbance or anxiety (Multi)     F03.90      Bipolar affective disorder, remission status unspecified (Multi)     F31.9      Hypertension, unspecified type     I10      Hyperlipidemia, unspecified hyperlipidemia type     E78.5      Neuropathy     G62.9      Weakness     R53.1        1. Depression, on medication.  2. Dementia, unchanged.  3. Bipolar disorder, on medication.  4. Hypertension, medically controlled.  5. Hyperlipidemia, on statin.  6. Neuropathy, on gabapentin.  7. BPH, on tamsulosin.  8. GERD, on PPI.  9. Intellectual disability, on supportive care.  10. Osteoarthritis, on Tylenol.  11. Weakness, on PT/OT.  12. Fall risk, on fall precautions.    Scribe Attestation  By signing my name below, IAntonette, Xander attest that this documentation has been prepared under the  direction and in the presence of Don Lozada MD.     All medical record entries made by the scribe were personally dictated by me I have reviewed the chart and agree the record accurately reflects my personal performance of his history physical examination and management

## 2025-07-06 ENCOUNTER — NURSING HOME VISIT (OUTPATIENT)
Dept: POST ACUTE CARE | Facility: EXTERNAL LOCATION | Age: 89
End: 2025-07-06
Payer: MEDICARE

## 2025-07-06 DIAGNOSIS — F03.90 DEMENTIA, UNSPECIFIED DEMENTIA SEVERITY, UNSPECIFIED DEMENTIA TYPE, UNSPECIFIED WHETHER BEHAVIORAL, PSYCHOTIC, OR MOOD DISTURBANCE OR ANXIETY (MULTI): ICD-10-CM

## 2025-07-06 DIAGNOSIS — M19.90 OSTEOARTHRITIS, UNSPECIFIED OSTEOARTHRITIS TYPE, UNSPECIFIED SITE: ICD-10-CM

## 2025-07-06 DIAGNOSIS — N40.0 BENIGN PROSTATIC HYPERPLASIA WITHOUT URINARY OBSTRUCTION: ICD-10-CM

## 2025-07-06 DIAGNOSIS — F79 INTELLECTUAL DISABILITY: Primary | ICD-10-CM

## 2025-07-06 DIAGNOSIS — F31.9 BIPOLAR AFFECTIVE DISORDER, REMISSION STATUS UNSPECIFIED (MULTI): ICD-10-CM

## 2025-07-06 DIAGNOSIS — F32.A DEPRESSION, UNSPECIFIED DEPRESSION TYPE: ICD-10-CM

## 2025-07-06 DIAGNOSIS — K21.9 GASTROESOPHAGEAL REFLUX DISEASE WITHOUT ESOPHAGITIS: ICD-10-CM

## 2025-07-06 DIAGNOSIS — E78.5 HYPERLIPIDEMIA, UNSPECIFIED HYPERLIPIDEMIA TYPE: ICD-10-CM

## 2025-07-06 DIAGNOSIS — G62.9 NEUROPATHY: ICD-10-CM

## 2025-07-06 DIAGNOSIS — Z91.81 AT RISK FOR FALLS: ICD-10-CM

## 2025-07-06 DIAGNOSIS — I10 HYPERTENSION, UNSPECIFIED TYPE: ICD-10-CM

## 2025-07-06 NOTE — LETTER
Patient: Anthony Blair  : 1935    Encounter Date: 2025    PLACE OF SERVICE:  [facility].    This is a subsequent visit.    Subjective  Patient ID: Anthony Blair is a 89 y.o. male who presents for Follow-up.    Mr. Anthony Blair is an 89-year-old male with history of dementia with intellectual disability.  He is unable to care for himself and requires supportive care.    Review of Systems   Constitutional:  Negative for chills and fever.   Cardiovascular:  Negative for chest pain.   All other systems reviewed and are negative.    Objective  /76   Pulse 76   Temp 36.5 °C (97.7 °F)   Resp 16     Physical Exam  Vitals reviewed.   Constitutional:       General: He is not in acute distress.     Comments: This is a well-developed, elderly male, sitting in a chair.   HENT:      Right Ear: Tympanic membrane, ear canal and external ear normal.      Left Ear: Tympanic membrane, ear canal and external ear normal.   Eyes:      General: No scleral icterus.     Pupils: Pupils are equal, round, and reactive to light.   Neck:      Vascular: No carotid bruit.   Cardiovascular:      Heart sounds: Normal heart sounds, S1 normal and S2 normal. No murmur heard.     No friction rub.   Pulmonary:      Effort: Pulmonary effort is normal.      Breath sounds: Normal breath sounds and air entry.   Abdominal:      Palpations: There is no hepatomegaly, splenomegaly or mass.   Musculoskeletal:         General: No swelling or deformity. Normal range of motion.      Cervical back: Neck supple.      Right lower leg: No edema.      Left lower leg: No edema.   Lymphadenopathy:      Cervical: No cervical adenopathy.      Upper Body:      Right upper body: No axillary adenopathy.      Left upper body: No axillary adenopathy.      Lower Body: No right inguinal adenopathy. No left inguinal adenopathy.   Neurological:      Mental Status: He is alert.      Cranial Nerves: Cranial nerves 2-12 are intact. No cranial  nerve deficit.      Sensory: No sensory deficit.      Motor: Motor function is intact. No weakness.      Gait: Gait is intact.      Deep Tendon Reflexes: Reflexes normal.      Comments: The patient is oriented x2.   Psychiatric:         Mood and Affect: Mood normal. Mood is not anxious or depressed. Affect is not angry.         Behavior: Behavior is not agitated.         Thought Content: Thought content normal.         Judgment: Judgment normal.     LAB WORK: Laboratory studies reviewed.    Assessment/Plan  Problem List Items Addressed This Visit           ICD-10-CM    Intellectual disability - Primary F79    At risk for falls Z91.81    Depression F32.A    Gastroesophageal reflux disease without esophagitis K21.9    Osteoarthritis M19.90    Benign prostatic hyperplasia without urinary obstruction N40.0     Other Visit Diagnoses         Codes      Dementia, unspecified dementia severity, unspecified dementia type, unspecified whether behavioral, psychotic, or mood disturbance or anxiety (Multi)     F03.90      Bipolar affective disorder, remission status unspecified (Multi)     F31.9      Neuropathy     G62.9      Hypertension, unspecified type     I10      Hyperlipidemia, unspecified hyperlipidemia type     E78.5        1. Intellectual disability, on supportive care.  2. Dementia, unchanged.  3. Bipolar disorder, on medication.  4. Depression, on medication.  5. Neuropathy, on gabapentin.  6. Hypertension, med controlled.  7. Hyperlipidemia, on statin.  8. GERD, on PPI.  9. BPH, on tamsulosin.  10. Osteoarthritis, on Tylenol.  11. Fall risk, fall precautions.    Scribe Attestation  By signing my name below, IElizabeth Scribe attest that this documentation has been prepared under the direction and in the presence of Don Lozada MD.     All medical record entries made by the scribe were personally dictated by me I have reviewed the chart and agree the record accurately reflects my personal performance of his  history physical examination and management      Electronically Signed By: Don Lozada MD   7/11/25 10:56 PM

## 2025-07-08 VITALS
RESPIRATION RATE: 16 BRPM | DIASTOLIC BLOOD PRESSURE: 76 MMHG | HEART RATE: 76 BPM | TEMPERATURE: 97.7 F | SYSTOLIC BLOOD PRESSURE: 124 MMHG

## 2025-07-08 ASSESSMENT — ENCOUNTER SYMPTOMS
FEVER: 0
CHILLS: 0

## 2025-07-08 NOTE — PROGRESS NOTES
PLACE OF SERVICE:  [facility].    This is a subsequent visit.    Subjective   Patient ID: Anthony Blair is a 89 y.o. male who presents for Follow-up.    Mr. Anthony Blair is an 89-year-old male with history of dementia with intellectual disability.  He is unable to care for himself and requires supportive care.    Review of Systems   Constitutional:  Negative for chills and fever.   Cardiovascular:  Negative for chest pain.   All other systems reviewed and are negative.    Objective   /76   Pulse 76   Temp 36.5 °C (97.7 °F)   Resp 16     Physical Exam  Vitals reviewed.   Constitutional:       General: He is not in acute distress.     Comments: This is a well-developed, elderly male, sitting in a chair.   HENT:      Right Ear: Tympanic membrane, ear canal and external ear normal.      Left Ear: Tympanic membrane, ear canal and external ear normal.   Eyes:      General: No scleral icterus.     Pupils: Pupils are equal, round, and reactive to light.   Neck:      Vascular: No carotid bruit.   Cardiovascular:      Heart sounds: Normal heart sounds, S1 normal and S2 normal. No murmur heard.     No friction rub.   Pulmonary:      Effort: Pulmonary effort is normal.      Breath sounds: Normal breath sounds and air entry.   Abdominal:      Palpations: There is no hepatomegaly, splenomegaly or mass.   Musculoskeletal:         General: No swelling or deformity. Normal range of motion.      Cervical back: Neck supple.      Right lower leg: No edema.      Left lower leg: No edema.   Lymphadenopathy:      Cervical: No cervical adenopathy.      Upper Body:      Right upper body: No axillary adenopathy.      Left upper body: No axillary adenopathy.      Lower Body: No right inguinal adenopathy. No left inguinal adenopathy.   Neurological:      Mental Status: He is alert.      Cranial Nerves: Cranial nerves 2-12 are intact. No cranial nerve deficit.      Sensory: No sensory deficit.      Motor: Motor  function is intact. No weakness.      Gait: Gait is intact.      Deep Tendon Reflexes: Reflexes normal.      Comments: The patient is oriented x2.   Psychiatric:         Mood and Affect: Mood normal. Mood is not anxious or depressed. Affect is not angry.         Behavior: Behavior is not agitated.         Thought Content: Thought content normal.         Judgment: Judgment normal.     LAB WORK: Laboratory studies reviewed.    Assessment/Plan   Problem List Items Addressed This Visit           ICD-10-CM    Intellectual disability - Primary F79    At risk for falls Z91.81    Depression F32.A    Gastroesophageal reflux disease without esophagitis K21.9    Osteoarthritis M19.90    Benign prostatic hyperplasia without urinary obstruction N40.0     Other Visit Diagnoses         Codes      Dementia, unspecified dementia severity, unspecified dementia type, unspecified whether behavioral, psychotic, or mood disturbance or anxiety (Multi)     F03.90      Bipolar affective disorder, remission status unspecified (Multi)     F31.9      Neuropathy     G62.9      Hypertension, unspecified type     I10      Hyperlipidemia, unspecified hyperlipidemia type     E78.5        1. Intellectual disability, on supportive care.  2. Dementia, unchanged.  3. Bipolar disorder, on medication.  4. Depression, on medication.  5. Neuropathy, on gabapentin.  6. Hypertension, med controlled.  7. Hyperlipidemia, on statin.  8. GERD, on PPI.  9. BPH, on tamsulosin.  10. Osteoarthritis, on Tylenol.  11. Fall risk, fall precautions.    Scribe Attestation  By signing my name below, IElizabeth Scribe attest that this documentation has been prepared under the direction and in the presence of Don Lozada MD.     All medical record entries made by the scribe were personally dictated by me I have reviewed the chart and agree the record accurately reflects my personal performance of his history physical examination and management

## 2025-07-24 ENCOUNTER — HOSPITAL ENCOUNTER (EMERGENCY)
Facility: HOSPITAL | Age: 89
Discharge: HOME | End: 2025-07-25
Attending: STUDENT IN AN ORGANIZED HEALTH CARE EDUCATION/TRAINING PROGRAM
Payer: MEDICARE

## 2025-07-24 ENCOUNTER — APPOINTMENT (OUTPATIENT)
Dept: RADIOLOGY | Facility: HOSPITAL | Age: 89
End: 2025-07-24
Payer: MEDICARE
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
## 2025-07-24 DIAGNOSIS — S00.01XA ABRASION OF SCALP, INITIAL ENCOUNTER: ICD-10-CM

## 2025-07-24 DIAGNOSIS — S00.83XA TRAUMATIC HEMATOMA OF FOREHEAD, INITIAL ENCOUNTER: ICD-10-CM

## 2025-07-24 DIAGNOSIS — M25.571 ACUTE RIGHT ANKLE PAIN: ICD-10-CM

## 2025-07-24 LAB
ALBUMIN SERPL BCP-MCNC: 4.6 G/DL (ref 3.4–5)
ALP SERPL-CCNC: 70 U/L (ref 33–136)
ALT SERPL W P-5'-P-CCNC: 12 U/L (ref 10–52)
ANION GAP SERPL CALCULATED.3IONS-SCNC: 12 MMOL/L (ref 10–20)
APTT PPP: 25.6 SECONDS (ref 22–32.5)
AST SERPL W P-5'-P-CCNC: 14 U/L (ref 9–39)
BASOPHILS # BLD AUTO: 0.03 X10*3/UL (ref 0–0.1)
BASOPHILS NFR BLD AUTO: 0.5 %
BILIRUB SERPL-MCNC: 0.3 MG/DL (ref 0–1.2)
BUN SERPL-MCNC: 34 MG/DL (ref 6–23)
CALCIUM SERPL-MCNC: 9.4 MG/DL (ref 8.6–10.3)
CHLORIDE SERPL-SCNC: 104 MMOL/L (ref 98–107)
CO2 SERPL-SCNC: 28 MMOL/L (ref 21–32)
CREAT SERPL-MCNC: 1.07 MG/DL (ref 0.5–1.3)
EGFRCR SERPLBLD CKD-EPI 2021: 67 ML/MIN/1.73M*2
EOSINOPHIL # BLD AUTO: 0.06 X10*3/UL (ref 0–0.4)
EOSINOPHIL NFR BLD AUTO: 1.1 %
ERYTHROCYTE [DISTWIDTH] IN BLOOD BY AUTOMATED COUNT: 15.1 % (ref 11.5–14.5)
GLUCOSE SERPL-MCNC: 110 MG/DL (ref 74–99)
HCT VFR BLD AUTO: 37.2 % (ref 41–52)
HGB BLD-MCNC: 11.9 G/DL (ref 13.5–17.5)
IMM GRANULOCYTES # BLD AUTO: 0.01 X10*3/UL (ref 0–0.5)
IMM GRANULOCYTES NFR BLD AUTO: 0.2 % (ref 0–0.9)
INR PPP: 1.1 (ref 0.9–1.2)
LYMPHOCYTES # BLD AUTO: 0.83 X10*3/UL (ref 0.8–3)
LYMPHOCYTES NFR BLD AUTO: 14.7 %
MAGNESIUM SERPL-MCNC: 2.1 MG/DL (ref 1.6–2.4)
MCH RBC QN AUTO: 30.9 PG (ref 26–34)
MCHC RBC AUTO-ENTMCNC: 32 G/DL (ref 32–36)
MCV RBC AUTO: 97 FL (ref 80–100)
MONOCYTES # BLD AUTO: 0.38 X10*3/UL (ref 0.05–0.8)
MONOCYTES NFR BLD AUTO: 6.7 %
NEUTROPHILS # BLD AUTO: 4.32 X10*3/UL (ref 1.6–5.5)
NEUTROPHILS NFR BLD AUTO: 76.8 %
NRBC BLD-RTO: 0 /100 WBCS (ref 0–0)
PLATELET # BLD AUTO: 145 X10*3/UL (ref 150–450)
POTASSIUM SERPL-SCNC: 4 MMOL/L (ref 3.5–5.3)
PROT SERPL-MCNC: 6.6 G/DL (ref 6.4–8.2)
PROTHROMBIN TIME: 11.3 SECONDS (ref 9.3–12.7)
RBC # BLD AUTO: 3.85 X10*6/UL (ref 4.5–5.9)
SODIUM SERPL-SCNC: 140 MMOL/L (ref 136–145)
WBC # BLD AUTO: 5.6 X10*3/UL (ref 4.4–11.3)

## 2025-07-24 PROCEDURE — 36415 COLL VENOUS BLD VENIPUNCTURE: CPT | Performed by: STUDENT IN AN ORGANIZED HEALTH CARE EDUCATION/TRAINING PROGRAM

## 2025-07-24 PROCEDURE — 71045 X-RAY EXAM CHEST 1 VIEW: CPT

## 2025-07-24 PROCEDURE — 85730 THROMBOPLASTIN TIME PARTIAL: CPT | Performed by: STUDENT IN AN ORGANIZED HEALTH CARE EDUCATION/TRAINING PROGRAM

## 2025-07-24 PROCEDURE — 83880 ASSAY OF NATRIURETIC PEPTIDE: CPT | Performed by: STUDENT IN AN ORGANIZED HEALTH CARE EDUCATION/TRAINING PROGRAM

## 2025-07-24 PROCEDURE — 86900 BLOOD TYPING SEROLOGIC ABO: CPT | Performed by: STUDENT IN AN ORGANIZED HEALTH CARE EDUCATION/TRAINING PROGRAM

## 2025-07-24 PROCEDURE — 71045 X-RAY EXAM CHEST 1 VIEW: CPT | Performed by: STUDENT IN AN ORGANIZED HEALTH CARE EDUCATION/TRAINING PROGRAM

## 2025-07-24 PROCEDURE — 70450 CT HEAD/BRAIN W/O DYE: CPT | Performed by: STUDENT IN AN ORGANIZED HEALTH CARE EDUCATION/TRAINING PROGRAM

## 2025-07-24 PROCEDURE — 85610 PROTHROMBIN TIME: CPT | Performed by: STUDENT IN AN ORGANIZED HEALTH CARE EDUCATION/TRAINING PROGRAM

## 2025-07-24 PROCEDURE — 83735 ASSAY OF MAGNESIUM: CPT | Performed by: STUDENT IN AN ORGANIZED HEALTH CARE EDUCATION/TRAINING PROGRAM

## 2025-07-24 PROCEDURE — 72125 CT NECK SPINE W/O DYE: CPT

## 2025-07-24 PROCEDURE — 84484 ASSAY OF TROPONIN QUANT: CPT | Performed by: STUDENT IN AN ORGANIZED HEALTH CARE EDUCATION/TRAINING PROGRAM

## 2025-07-24 PROCEDURE — 99285 EMERGENCY DEPT VISIT HI MDM: CPT | Mod: 25 | Performed by: STUDENT IN AN ORGANIZED HEALTH CARE EDUCATION/TRAINING PROGRAM

## 2025-07-24 PROCEDURE — 72125 CT NECK SPINE W/O DYE: CPT | Performed by: STUDENT IN AN ORGANIZED HEALTH CARE EDUCATION/TRAINING PROGRAM

## 2025-07-24 PROCEDURE — 70450 CT HEAD/BRAIN W/O DYE: CPT

## 2025-07-24 PROCEDURE — 80053 COMPREHEN METABOLIC PANEL: CPT | Performed by: STUDENT IN AN ORGANIZED HEALTH CARE EDUCATION/TRAINING PROGRAM

## 2025-07-24 PROCEDURE — 85025 COMPLETE CBC W/AUTO DIFF WBC: CPT | Performed by: STUDENT IN AN ORGANIZED HEALTH CARE EDUCATION/TRAINING PROGRAM

## 2025-07-24 ASSESSMENT — PAIN DESCRIPTION - DESCRIPTORS: DESCRIPTORS: ACHING

## 2025-07-24 ASSESSMENT — PAIN DESCRIPTION - PAIN TYPE: TYPE: ACUTE PAIN

## 2025-07-24 ASSESSMENT — PAIN DESCRIPTION - ORIENTATION: ORIENTATION: LEFT

## 2025-07-24 ASSESSMENT — PAIN SCALES - GENERAL: PAINLEVEL_OUTOF10: 4

## 2025-07-24 ASSESSMENT — PAIN DESCRIPTION - LOCATION: LOCATION: HEAD

## 2025-07-24 ASSESSMENT — PAIN - FUNCTIONAL ASSESSMENT: PAIN_FUNCTIONAL_ASSESSMENT: 0-10

## 2025-07-25 ENCOUNTER — APPOINTMENT (OUTPATIENT)
Dept: RADIOLOGY | Facility: HOSPITAL | Age: 89
End: 2025-07-25
Payer: MEDICARE

## 2025-07-25 VITALS
OXYGEN SATURATION: 96 % | HEIGHT: 72 IN | SYSTOLIC BLOOD PRESSURE: 143 MMHG | BODY MASS INDEX: 14 KG/M2 | RESPIRATION RATE: 18 BRPM | DIASTOLIC BLOOD PRESSURE: 68 MMHG | HEART RATE: 60 BPM | WEIGHT: 103.4 LBS | TEMPERATURE: 97.5 F

## 2025-07-25 LAB
ABO GROUP (TYPE) IN BLOOD: NORMAL
ANTIBODY SCREEN: NORMAL
BNP SERPL-MCNC: 33 PG/ML (ref 0–99)
CARDIAC TROPONIN I PNL SERPL HS: <3 NG/L (ref 0–20)
RH FACTOR (ANTIGEN D): NORMAL

## 2025-07-25 PROCEDURE — 73610 X-RAY EXAM OF ANKLE: CPT | Mod: RIGHT SIDE | Performed by: STUDENT IN AN ORGANIZED HEALTH CARE EDUCATION/TRAINING PROGRAM

## 2025-07-25 PROCEDURE — 96374 THER/PROPH/DIAG INJ IV PUSH: CPT

## 2025-07-25 PROCEDURE — 2500000004 HC RX 250 GENERAL PHARMACY W/ HCPCS (ALT 636 FOR OP/ED): Mod: JZ | Performed by: STUDENT IN AN ORGANIZED HEALTH CARE EDUCATION/TRAINING PROGRAM

## 2025-07-25 PROCEDURE — 2500000005 HC RX 250 GENERAL PHARMACY W/O HCPCS: Performed by: STUDENT IN AN ORGANIZED HEALTH CARE EDUCATION/TRAINING PROGRAM

## 2025-07-25 PROCEDURE — 73610 X-RAY EXAM OF ANKLE: CPT | Mod: RT

## 2025-07-25 RX ORDER — KETOROLAC TROMETHAMINE 15 MG/ML
15 INJECTION, SOLUTION INTRAMUSCULAR; INTRAVENOUS ONCE
Status: COMPLETED | OUTPATIENT
Start: 2025-07-25 | End: 2025-07-25

## 2025-07-25 RX ORDER — LIDOCAINE 560 MG/1
1 PATCH PERCUTANEOUS; TOPICAL; TRANSDERMAL ONCE
Status: DISCONTINUED | OUTPATIENT
Start: 2025-07-25 | End: 2025-07-25 | Stop reason: HOSPADM

## 2025-07-25 RX ADMIN — LIDOCAINE 4% 1 PATCH: 40 PATCH TOPICAL at 00:14

## 2025-07-25 RX ADMIN — KETOROLAC TROMETHAMINE 15 MG: 15 INJECTION, SOLUTION INTRAMUSCULAR; INTRAVENOUS at 00:14

## 2025-07-25 ASSESSMENT — PAIN - FUNCTIONAL ASSESSMENT
PAIN_FUNCTIONAL_ASSESSMENT: 0-10
PAIN_FUNCTIONAL_ASSESSMENT: 0-10

## 2025-07-25 ASSESSMENT — PAIN DESCRIPTION - ORIENTATION: ORIENTATION: RIGHT

## 2025-07-25 ASSESSMENT — PAIN SCALES - GENERAL
PAINLEVEL_OUTOF10: 2
PAINLEVEL_OUTOF10: 5 - MODERATE PAIN

## 2025-07-25 ASSESSMENT — PAIN DESCRIPTION - LOCATION
LOCATION: NECK
LOCATION: ANKLE

## 2025-07-25 ASSESSMENT — PAIN DESCRIPTION - PAIN TYPE: TYPE: ACUTE PAIN

## 2025-07-25 NOTE — DISCHARGE INSTRUCTIONS
Thank you for allowing myself and the team to take care of you during your emergency situation and addressing your health concerns.    You were evaluated for traumatic injuries.     Your likely condition/diagnosis: Superficial injuries to your scalp and forehead along with right ankle sprain from fall    During your visit in the emergency department you were evaluated for your presenting symptoms.  All efforts were made to identify the source of your symptoms and identify any life-threatening conditions.  These life-threatening conditions that were attempted to be identified and medically managed/treated include but not limited to traumatic bleed inside your head/skull involving your brain, facial fractures, axial skeleton/spine (cervical/thoracic/lumbar) fractures/malalignment or involving spinal cord, appendicular skeleton/extremities fracture/dislocation, pneumothorax/hemothorax (blood or air in your chest), intra-abdominal trauma (solid organ injury or intestine injury) to include bleeding inside your abdomen, pelvic fracture or bleeding in your pelvis to include genital/bladder injury.  Additionally, you may be experiencing symptoms that are non-life-threatening but are uncomfortable and disruptive to your everyday life.  All efforts were made to manage your symptoms while in the emergency department along with appropriate at home therapy for symptomatic management during your recovery. Please be aware that not all of the conditions explained/discussed in these discharge instructions are applicable to your condition but encompass many of the conditions that were evaluated for during your ED encounter.  You will likely experience significant body discomfort/pain that is often termed as acute pain syndrome which involves muscle soreness and bruising with potential superficial abrasions associated with your traumatic event.    During your evaluation and assessment, all measures were taken to evaluate you and  address your health concerns to identify dangerous and life threatening health conditions. It is not possible to identify all health conditions or pathologies and eliminate any chance of unfavorable outcomes while in the Emergency Department. My team encourages you to be vigilant with your health and follow-up with the appropriate providers outlined in your discharge paperwork. Please return to the Emergency Department if you feel that your health has not improved or experiencing worsening symptoms.    Special instructions please make a follow-up point with your primary care physician to further manage your symptoms and address your health concerns.  Please utilize the discharge instructions to further understand your symptoms and manage your health.    Incidental findings:  None

## 2025-07-25 NOTE — ED PROVIDER NOTES
Emergency Department Provider Note       History of Present Illness     History provided by: EMS  Limitations to History: Dementia  External Records Reviewed with Brief Summary: None    HPI:  Anthony Blair is a 89 y.o. male presents to the ED by EMS for reported unwitnessed fall at [facility].  EMS notes status patient sitting next to bed with blood in his hair soon patient fell.  EMS notes baseline dementia, dried blood with small wound to left temple head and patient endorsing right ankle pain.  EMS notes VSS on scene during transport with sinus rhythm on monitor.  Patient is unsure what happened and only endorses left scalp and right ankle pain.  Presently denies any neck/back pain, vision changes, cardiopulmonary symptoms or GI/ symptoms.  Patient low historian secondary to level of cognition associated with dementia.    Physical Exam   Triage vitals:  T 36.4 °C (97.5 °F)  HR 67  /70  RR 18  O2 97 % None (Room air)    Physical Exam  Vitals and nursing note reviewed.   Constitutional:       Appearance: Normal appearance. He is normal weight.   HENT:      Head: Normocephalic.      Comments: Superficial left temporal cutaneous abrasion without any active hemorrhage, minimal underlying septal hematoma     Right Ear: Tympanic membrane and external ear normal.      Left Ear: Tympanic membrane and external ear normal.      Ears:      Comments: No hemotympanum bilaterally     Nose: Nose normal.      Comments: No epistaxis or septal hematoma     Mouth/Throat:      Mouth: Mucous membranes are moist.      Pharynx: Oropharynx is clear.      Comments: No perioral/intraoral lesions/injuries, no appreciable dental fractures or avulsions/impactions, no appreciable dental malocclusion, mandible is nontender and no gross deformities    Eyes:      Extraocular Movements: Extraocular movements intact.      Conjunctiva/sclera: Conjunctivae normal.      Pupils: Pupils are equal, round, and reactive to light.       Comments: Pupils 4-2 mm equal and symmetrically reactive, no appreciable periorbital injuries/angioedema, no proptosis, periorbital rims are intact     Cardiovascular:      Rate and Rhythm: Normal rate and regular rhythm.      Pulses:           Radial pulses are 2+ on the right side and 2+ on the left side.        Dorsalis pedis pulses are 2+ on the right side and 2+ on the left side.      Heart sounds: Normal heart sounds. Heart sounds not distant. No murmur heard.     Comments: Equal and symmetrical distal pulses (BUE/BLE)  Pulmonary:      Effort: Pulmonary effort is normal. No respiratory distress.      Breath sounds: Normal breath sounds and air entry. No decreased air movement. No decreased breath sounds.      Comments: CTAB, no appreciable chest wall tenderness, no crepitus/deformities, no appreciable paradoxical movement with respiratory effort, equal and symmetrical chest rise with respiratory effort  Abdominal:      General: Abdomen is flat.      Palpations: Abdomen is soft.      Tenderness: There is no abdominal tenderness.      Comments: No evidence of trauma, no appreciable ecchymosis     Musculoskeletal:      Cervical back: Normal and neck supple. No signs of trauma, tenderness or bony tenderness.      Thoracic back: Normal. No signs of trauma, tenderness or bony tenderness.      Lumbar back: Normal. No signs of trauma, tenderness or bony tenderness.      Comments: No appreciable C/T/L-spine tenderness, no appreciable spinous process step-off/deformities, no obvious trauma, no gross deformities or injuries of extremities x 4, endorses associated pain palpation of R ankle without any gross deformities, limited AROM secondary to pain, neuro vas intact distally, appropriate flexion/symptoms of digits     Neurological:      Mental Status: He is alert and oriented to person, place, and time.      GCS: GCS eye subscore is 4. GCS verbal subscore is 5. GCS motor subscore is 6.      Sensory: Sensation is  intact. No sensory deficit.      Motor: Motor function is intact. No weakness or abnormal muscle tone.      Comments: Gross motor/sensation intact x 4 (BUE/BLE)         Medical Decision Making & ED Course   Medical Decision Makin y.o. male presented to the ED for evaluation for unwitnessed fall with left scalp/temple wound and right ankle pain with concerning PMHx of dementia, HTN, HLD, GERD, depression, BPH, pacemaker in situ.  I personally reviewed and interpreted VS, labs, images, and EKG which are as stated below in the ED course.    Assessment/evaluation consistent with superficial left scalp abrasion and right ankle sprain.    Prescribed none.  After receiving an appropriate exam, clinical work-up, and necessary interventions/treatment, Patient is appropriate for discharge at this time due to no concerning symptoms or findings requiring hospitalization for stabilization or further interrogation/management and is appropriate for management of symptoms at home with recommended appropriate outpatient follow-up.  Patient was encouraged to ask any questions or for clarification of today's ED encounter.  Patient is agreeable to plan of care. Discussed with Patient today's results/findings and likely diagnosis, provided appropriate RTED precautions along with recommended follow-up with PCP.      Per Chart Review: Nothing pertinent to this ED encounter.      Parts of this chart have been completed using voice-to-text recognition software. Please excuse any errors of transcription that were missed for editing/correcting.  ----      Differential diagnoses considered include but are not limited to: Traumatic ICH, ischemic CVA, calvarium fracture, malignant cardiac arrhythmia, significant electrolyte/metabolic derangement, anemia, ACS/MI, acute HF, axial/appendicular skeletal trauma    Social Determinants of Health which Significantly Impact Care: Social Determinants of Health which Significantly Impact Care: None  identified     EKG Independent Interpretation: EKG interpreted by myself. Please see ED Course for full interpretation.    Independent Result Review and Interpretation: Relevant laboratory and radiographic results were reviewed and independently interpreted by myself.  As necessary, they are commented on in the ED Course.    Chronic conditions affecting the patient's care: As documented above in MDM    The patient was discussed with the following consultants/services: None    Care Considerations: None    ED Course:  ED Course as of 07/27/25 1452   Thu Jul 24, 2025   2259 VSS on presentation in setting of reported unwitnessed fall with obvious head trauma [BC]   2301 I personally reviewed and interpreted the EKG @2301: NSR 64, normal axis/intervals and no appreciable ischemia, poor R wave progression in precordial leads, non-specific TW findings, and prior EKG on 9/12/2024 reviewed without any appreciable specific/identifiable changes [BC]   2330 Comprehensive metabolic panel(!)  Unremarkable and noncontributory to Patient condition/symptoms [BC]   2330 CBC and Auto Differential(!)  Unremarkable and noncontributory to Patient condition/symptoms [BC]   2330 Magnesium  WNL [BC]   2330 B-Type Natriuretic Peptide  WNL, no concern for acute HF in setting of unwitnessed fall [BC]   2330 Protime-INR  No coagulopathy in the setting of trauma/unwitnessed fall with head strike.   [BC]   2330 APTT  No coagulopathy in the setting of trauma/unwitnessed fall with head strike [BC]   2330 Troponin I, High Sensitivity  WNL in the setting of unwitnessed fall, EKG remarkable for ischemic changes, no concern for ACS/MI and no evidence of malignant arrhythmias [BC]   Fri Jul 25, 2025   0010 CT head wo IV contrast  IMPRESSION:  No acute intracranial abnormality. Moderate chronic small-vessel  ischemic change.      No evidence of cervical spine fracture or acute traumatic  malalignment.    I have personally reviewed and interpreted the  images, no evidence of acute intracranial processes, clinical evidence of traumatic ICH or calvarial fracture, agree with radiology final read [BC]   0400 On reassessment patient endorses no active symptoms post ED interventions, no new symptoms.  At baseline cognition, no clinical evidence of impending decompensation. [BC]      ED Course User Index  [BC] Britton Welch MD         Diagnoses as of 07/27/25 5832   Fall, initial encounter   Traumatic hematoma of forehead, initial encounter   Abrasion of scalp, initial encounter   Acute right ankle pain       Disposition   As a result of the work-up, the patient was discharged home.  he was informed of his diagnosis and instructed to come back with any concerns or worsening of condition.  he and was agreeable to the plan as discussed above.  he was given the opportunity to ask questions.  All of the patient's questions were answered.    Procedures   Procedures        Britton Welch MD  Emergency Medicine                                                       Britton Welch MD  07/27/25 4451

## 2025-08-02 ENCOUNTER — NURSING HOME VISIT (OUTPATIENT)
Dept: POST ACUTE CARE | Facility: EXTERNAL LOCATION | Age: 89
End: 2025-08-02
Payer: MEDICARE

## 2025-08-02 DIAGNOSIS — Z91.81 AT RISK FOR FALLS: ICD-10-CM

## 2025-08-02 DIAGNOSIS — I11.0 HYPERTENSIVE HEART DISEASE WITH HEART FAILURE: ICD-10-CM

## 2025-08-02 DIAGNOSIS — M19.90 OSTEOARTHRITIS, UNSPECIFIED OSTEOARTHRITIS TYPE, UNSPECIFIED SITE: ICD-10-CM

## 2025-08-02 DIAGNOSIS — Z79.4 TYPE 2 DIABETES MELLITUS WITH DIABETIC POLYNEUROPATHY, WITH LONG-TERM CURRENT USE OF INSULIN: ICD-10-CM

## 2025-08-02 DIAGNOSIS — G62.9 NEUROPATHY: ICD-10-CM

## 2025-08-02 DIAGNOSIS — E11.42 TYPE 2 DIABETES MELLITUS WITH DIABETIC POLYNEUROPATHY, WITH LONG-TERM CURRENT USE OF INSULIN: ICD-10-CM

## 2025-08-02 DIAGNOSIS — K21.9 GASTROESOPHAGEAL REFLUX DISEASE WITHOUT ESOPHAGITIS: ICD-10-CM

## 2025-08-02 DIAGNOSIS — I10 HYPERTENSION, UNSPECIFIED TYPE: ICD-10-CM

## 2025-08-02 DIAGNOSIS — F32.A DEPRESSION, UNSPECIFIED DEPRESSION TYPE: ICD-10-CM

## 2025-08-02 DIAGNOSIS — N40.0 BENIGN PROSTATIC HYPERPLASIA WITHOUT URINARY OBSTRUCTION: ICD-10-CM

## 2025-08-02 DIAGNOSIS — F79 INTELLECTUAL DISABILITY: ICD-10-CM

## 2025-08-02 DIAGNOSIS — E78.5 HYPERLIPIDEMIA, UNSPECIFIED HYPERLIPIDEMIA TYPE: ICD-10-CM

## 2025-08-02 DIAGNOSIS — F31.9 BIPOLAR AFFECTIVE DISORDER, REMISSION STATUS UNSPECIFIED (MULTI): ICD-10-CM

## 2025-08-02 DIAGNOSIS — F03.90 DEMENTIA, UNSPECIFIED DEMENTIA SEVERITY, UNSPECIFIED DEMENTIA TYPE, UNSPECIFIED WHETHER BEHAVIORAL, PSYCHOTIC, OR MOOD DISTURBANCE OR ANXIETY (MULTI): Primary | ICD-10-CM

## 2025-08-02 DIAGNOSIS — D61.818 OTHER PANCYTOPENIA (MULTI): ICD-10-CM

## 2025-08-02 PROCEDURE — 99309 SBSQ NF CARE MODERATE MDM 30: CPT | Performed by: INTERNAL MEDICINE

## 2025-08-17 ENCOUNTER — NURSING HOME VISIT (OUTPATIENT)
Dept: POST ACUTE CARE | Facility: EXTERNAL LOCATION | Age: 89
End: 2025-08-17
Payer: MEDICARE

## 2025-08-17 DIAGNOSIS — G62.9 NEUROPATHY: ICD-10-CM

## 2025-08-17 DIAGNOSIS — F32.A DEPRESSION, UNSPECIFIED DEPRESSION TYPE: ICD-10-CM

## 2025-08-17 DIAGNOSIS — K21.9 GASTROESOPHAGEAL REFLUX DISEASE WITHOUT ESOPHAGITIS: ICD-10-CM

## 2025-08-17 DIAGNOSIS — E78.5 HYPERLIPIDEMIA, UNSPECIFIED HYPERLIPIDEMIA TYPE: ICD-10-CM

## 2025-08-17 DIAGNOSIS — N40.0 BENIGN PROSTATIC HYPERPLASIA WITHOUT URINARY OBSTRUCTION: ICD-10-CM

## 2025-08-17 DIAGNOSIS — Z91.81 AT RISK FOR FALLS: ICD-10-CM

## 2025-08-17 DIAGNOSIS — F03.90 DEMENTIA, UNSPECIFIED DEMENTIA SEVERITY, UNSPECIFIED DEMENTIA TYPE, UNSPECIFIED WHETHER BEHAVIORAL, PSYCHOTIC, OR MOOD DISTURBANCE OR ANXIETY (MULTI): Primary | ICD-10-CM

## 2025-08-17 DIAGNOSIS — F79 INTELLECTUAL DISABILITY: ICD-10-CM

## 2025-08-17 DIAGNOSIS — F31.9 BIPOLAR AFFECTIVE DISORDER, REMISSION STATUS UNSPECIFIED (MULTI): ICD-10-CM

## 2025-08-17 DIAGNOSIS — M19.90 OSTEOARTHRITIS, UNSPECIFIED OSTEOARTHRITIS TYPE, UNSPECIFIED SITE: ICD-10-CM

## 2025-08-17 DIAGNOSIS — I10 HYPERTENSION, UNSPECIFIED TYPE: ICD-10-CM

## 2025-08-17 PROCEDURE — 99308 SBSQ NF CARE LOW MDM 20: CPT | Performed by: INTERNAL MEDICINE

## 2025-08-18 VITALS
DIASTOLIC BLOOD PRESSURE: 82 MMHG | SYSTOLIC BLOOD PRESSURE: 126 MMHG | HEART RATE: 76 BPM | RESPIRATION RATE: 16 BRPM | TEMPERATURE: 97.8 F

## 2025-08-18 VITALS
SYSTOLIC BLOOD PRESSURE: 120 MMHG | DIASTOLIC BLOOD PRESSURE: 82 MMHG | HEART RATE: 80 BPM | RESPIRATION RATE: 16 BRPM | TEMPERATURE: 98 F

## 2025-08-18 ASSESSMENT — ENCOUNTER SYMPTOMS
FEVER: 0
CHILLS: 0
CHILLS: 0
FEVER: 0

## 2025-08-19 PROBLEM — Z79.4 TYPE 2 DIABETES MELLITUS WITH DIABETIC POLYNEUROPATHY, WITH LONG-TERM CURRENT USE OF INSULIN: Status: ACTIVE | Noted: 2025-08-19

## 2025-08-19 PROBLEM — D61.818 OTHER PANCYTOPENIA (MULTI): Status: ACTIVE | Noted: 2025-08-19

## 2025-08-19 PROBLEM — I11.0 HYPERTENSIVE HEART DISEASE WITH HEART FAILURE: Status: ACTIVE | Noted: 2025-08-19

## 2025-08-19 PROBLEM — E11.42 TYPE 2 DIABETES MELLITUS WITH DIABETIC POLYNEUROPATHY, WITH LONG-TERM CURRENT USE OF INSULIN: Status: ACTIVE | Noted: 2025-08-19
